# Patient Record
Sex: FEMALE | Race: WHITE | NOT HISPANIC OR LATINO | Employment: OTHER | URBAN - METROPOLITAN AREA
[De-identification: names, ages, dates, MRNs, and addresses within clinical notes are randomized per-mention and may not be internally consistent; named-entity substitution may affect disease eponyms.]

---

## 2023-03-15 LAB
ALBUMIN (G/DL) IN SER/PLAS: 4.1 G/DL (ref 3.4–5)
ANION GAP IN SER/PLAS: 17 MMOL/L (ref 10–20)
CALCIUM (MG/DL) IN SER/PLAS: 9.2 MG/DL (ref 8.6–10.6)
CARBON DIOXIDE, TOTAL (MMOL/L) IN SER/PLAS: 26 MMOL/L (ref 21–32)
CHLORIDE (MMOL/L) IN SER/PLAS: 104 MMOL/L (ref 98–107)
CREATININE (MG/DL) IN SER/PLAS: 0.72 MG/DL (ref 0.5–1.05)
GFR FEMALE: >90 ML/MIN/1.73M2
GLUCOSE (MG/DL) IN SER/PLAS: 107 MG/DL (ref 74–99)
PHOSPHATE (MG/DL) IN SER/PLAS: 3.8 MG/DL (ref 2.5–4.9)
POTASSIUM (MMOL/L) IN SER/PLAS: 3.9 MMOL/L (ref 3.5–5.3)
SODIUM (MMOL/L) IN SER/PLAS: 143 MMOL/L (ref 136–145)
UREA NITROGEN (MG/DL) IN SER/PLAS: 11 MG/DL (ref 6–23)

## 2023-04-26 ENCOUNTER — OFFICE VISIT (OUTPATIENT)
Dept: PRIMARY CARE | Facility: CLINIC | Age: 68
End: 2023-04-26
Payer: COMMERCIAL

## 2023-04-26 VITALS
TEMPERATURE: 97.5 F | SYSTOLIC BLOOD PRESSURE: 128 MMHG | BODY MASS INDEX: 32.13 KG/M2 | HEIGHT: 68 IN | HEART RATE: 98 BPM | OXYGEN SATURATION: 100 % | RESPIRATION RATE: 16 BRPM | DIASTOLIC BLOOD PRESSURE: 88 MMHG | WEIGHT: 212 LBS

## 2023-04-26 DIAGNOSIS — M47.16 LUMBAR SPONDYLOSIS WITH MYELOPATHY: ICD-10-CM

## 2023-04-26 DIAGNOSIS — I48.92 ATRIAL FLUTTER, UNSPECIFIED TYPE (MULTI): ICD-10-CM

## 2023-04-26 DIAGNOSIS — I48.0 PAROXYSMAL ATRIAL FIBRILLATION WITH RVR (MULTI): ICD-10-CM

## 2023-04-26 DIAGNOSIS — J44.89 COPD WITH CHRONIC BRONCHITIS (MULTI): ICD-10-CM

## 2023-04-26 DIAGNOSIS — K21.9 GASTRO-ESOPHAGEAL REFLUX DISEASE WITHOUT ESOPHAGITIS: ICD-10-CM

## 2023-04-26 DIAGNOSIS — J44.1 COPD EXACERBATION (MULTI): ICD-10-CM

## 2023-04-26 DIAGNOSIS — E11.9 TYPE 2 DIABETES MELLITUS WITHOUT COMPLICATION, WITHOUT LONG-TERM CURRENT USE OF INSULIN (MULTI): Primary | ICD-10-CM

## 2023-04-26 DIAGNOSIS — F33.1 MODERATE EPISODE OF RECURRENT MAJOR DEPRESSIVE DISORDER (MULTI): ICD-10-CM

## 2023-04-26 DIAGNOSIS — E78.2 MIXED HYPERLIPIDEMIA: ICD-10-CM

## 2023-04-26 DIAGNOSIS — Z12.31 ENCOUNTER FOR SCREENING MAMMOGRAM FOR BREAST CANCER: ICD-10-CM

## 2023-04-26 DIAGNOSIS — I10 ESSENTIAL HYPERTENSION: ICD-10-CM

## 2023-04-26 PROBLEM — M16.0 PRIMARY OSTEOARTHRITIS OF BOTH HIPS: Status: ACTIVE | Noted: 2023-04-26

## 2023-04-26 PROBLEM — K92.2 ACUTE GI BLEEDING: Status: ACTIVE | Noted: 2023-04-26

## 2023-04-26 PROBLEM — Z95.818 PRESENCE OF WATCHMAN LEFT ATRIAL APPENDAGE CLOSURE DEVICE: Status: ACTIVE | Noted: 2023-04-26

## 2023-04-26 PROBLEM — R73.9 HYPERGLYCEMIA: Status: ACTIVE | Noted: 2023-04-26

## 2023-04-26 PROBLEM — K63.5 COLON POLYPS: Status: ACTIVE | Noted: 2023-04-26

## 2023-04-26 PROBLEM — R30.0 DYSURIA: Status: ACTIVE | Noted: 2023-04-26

## 2023-04-26 PROBLEM — M54.30 SCIATICA: Status: ACTIVE | Noted: 2023-04-26

## 2023-04-26 PROBLEM — M54.2 NECK PAIN ON RIGHT SIDE: Status: ACTIVE | Noted: 2023-04-26

## 2023-04-26 PROBLEM — F41.1 GENERALIZED ANXIETY DISORDER: Status: ACTIVE | Noted: 2023-04-26

## 2023-04-26 PROBLEM — D50.0 ANEMIA DUE TO BLOOD LOSS: Status: ACTIVE | Noted: 2023-04-26

## 2023-04-26 PROBLEM — M25.551 CHRONIC PAIN OF BOTH HIPS: Status: ACTIVE | Noted: 2023-04-26

## 2023-04-26 PROBLEM — R53.82 CHRONIC FATIGUE: Status: ACTIVE | Noted: 2023-04-26

## 2023-04-26 PROBLEM — G89.29 CHRONIC LOW BACK PAIN: Status: ACTIVE | Noted: 2023-04-26

## 2023-04-26 PROBLEM — G89.29 CHRONIC PAIN OF BOTH HIPS: Status: ACTIVE | Noted: 2023-04-26

## 2023-04-26 PROBLEM — N39.0 ACUTE UTI: Status: ACTIVE | Noted: 2023-04-26

## 2023-04-26 PROBLEM — R06.09 DYSPNEA ON MINIMAL EXERTION: Status: ACTIVE | Noted: 2023-04-26

## 2023-04-26 PROBLEM — M25.511 ACUTE PAIN OF RIGHT SHOULDER: Status: ACTIVE | Noted: 2023-04-26

## 2023-04-26 PROBLEM — M54.50 CHRONIC LOW BACK PAIN: Status: ACTIVE | Noted: 2023-04-26

## 2023-04-26 PROBLEM — R20.2 NUMBNESS AND TINGLING OF RIGHT UPPER EXTREMITY: Status: ACTIVE | Noted: 2023-04-26

## 2023-04-26 PROBLEM — L03.116 CELLULITIS OF FOOT, LEFT: Status: ACTIVE | Noted: 2023-04-26

## 2023-04-26 PROBLEM — H81.10 BPPV (BENIGN PAROXYSMAL POSITIONAL VERTIGO): Status: ACTIVE | Noted: 2023-04-26

## 2023-04-26 PROBLEM — G89.29 CHRONIC PAIN OF LEFT KNEE: Status: ACTIVE | Noted: 2023-04-26

## 2023-04-26 PROBLEM — R39.9 SYMPTOMS OF URINARY TRACT INFECTION: Status: ACTIVE | Noted: 2023-04-26

## 2023-04-26 PROBLEM — R35.0 INCREASED URINARY FREQUENCY: Status: ACTIVE | Noted: 2023-04-26

## 2023-04-26 PROBLEM — S46.911A: Status: ACTIVE | Noted: 2023-04-26

## 2023-04-26 PROBLEM — Z96.649 STATUS POST THR (TOTAL HIP REPLACEMENT): Status: ACTIVE | Noted: 2023-04-26

## 2023-04-26 PROBLEM — R30.0 BURNING WITH URINATION: Status: ACTIVE | Noted: 2023-04-26

## 2023-04-26 PROBLEM — N39.0 RECURRENT UTI: Status: ACTIVE | Noted: 2023-04-26

## 2023-04-26 PROBLEM — R10.9 FLANK PAIN: Status: ACTIVE | Noted: 2023-04-26

## 2023-04-26 PROBLEM — S83.251A BUCKET HANDLE TEAR OF LATERAL MENISCUS OF RIGHT KNEE: Status: ACTIVE | Noted: 2023-04-26

## 2023-04-26 PROBLEM — M25.562 CHRONIC PAIN OF LEFT KNEE: Status: ACTIVE | Noted: 2023-04-26

## 2023-04-26 PROBLEM — R73.01 IMPAIRED FASTING GLUCOSE: Status: ACTIVE | Noted: 2023-04-26

## 2023-04-26 PROBLEM — E66.9 OBESITY: Status: ACTIVE | Noted: 2023-04-26

## 2023-04-26 PROBLEM — R60.0 BILATERAL LEG EDEMA: Status: ACTIVE | Noted: 2023-04-26

## 2023-04-26 PROBLEM — M77.12 LATERAL EPICONDYLITIS OF LEFT ELBOW: Status: ACTIVE | Noted: 2023-04-26

## 2023-04-26 PROBLEM — S90.32XA CONTUSION OF LEFT FOOT: Status: ACTIVE | Noted: 2023-04-26

## 2023-04-26 PROBLEM — D64.9 CHRONIC ANEMIA: Status: ACTIVE | Noted: 2023-04-26

## 2023-04-26 PROBLEM — K52.9 JEJUNITIS: Status: ACTIVE | Noted: 2023-04-26

## 2023-04-26 PROBLEM — R20.0 NUMBNESS AND TINGLING OF RIGHT UPPER EXTREMITY: Status: ACTIVE | Noted: 2023-04-26

## 2023-04-26 PROBLEM — R06.02 SHORTNESS OF BREATH: Status: ACTIVE | Noted: 2023-04-26

## 2023-04-26 PROBLEM — J18.9 COMMUNITY ACQUIRED PNEUMONIA: Status: ACTIVE | Noted: 2023-04-26

## 2023-04-26 PROBLEM — M19.91 PRIMARY LOCALIZED OSTEOARTHROSIS OF MULTIPLE SITES: Status: ACTIVE | Noted: 2023-04-26

## 2023-04-26 PROBLEM — N20.0 RECURRENT NEPHROLITHIASIS: Status: ACTIVE | Noted: 2023-04-26

## 2023-04-26 PROBLEM — R07.89 ATYPICAL CHEST PAIN: Status: ACTIVE | Noted: 2023-04-26

## 2023-04-26 PROBLEM — M25.362 INSTABILITY OF LEFT KNEE JOINT: Status: ACTIVE | Noted: 2023-04-26

## 2023-04-26 PROBLEM — R80.9 MICROALBUMINURIA: Status: ACTIVE | Noted: 2023-04-26

## 2023-04-26 PROBLEM — S83.282A TEAR OF LATERAL MENISCUS OF LEFT KNEE, CURRENT: Status: ACTIVE | Noted: 2023-04-26

## 2023-04-26 PROBLEM — M25.552 CHRONIC PAIN OF BOTH HIPS: Status: ACTIVE | Noted: 2023-04-26

## 2023-04-26 PROBLEM — R31.0 HEMATURIA, GROSS: Status: ACTIVE | Noted: 2023-04-26

## 2023-04-26 PROBLEM — E53.8 VITAMIN B12 DEFICIENCY: Status: ACTIVE | Noted: 2023-04-26

## 2023-04-26 PROBLEM — D53.9 MACROCYTIC ANEMIA: Status: ACTIVE | Noted: 2023-04-26

## 2023-04-26 PROBLEM — N35.919 URETHRAL STRICTURE: Status: ACTIVE | Noted: 2023-04-26

## 2023-04-26 PROBLEM — F51.04 CHRONIC INSOMNIA: Status: ACTIVE | Noted: 2023-04-26

## 2023-04-26 PROBLEM — K92.2 LOWER GASTROINTESTINAL BLEEDING: Status: ACTIVE | Noted: 2023-04-26

## 2023-04-26 PROBLEM — N23 RENAL COLIC ON LEFT SIDE: Status: ACTIVE | Noted: 2023-04-26

## 2023-04-26 PROBLEM — M54.16 LUMBAR RADICULOPATHY: Status: ACTIVE | Noted: 2023-04-26

## 2023-04-26 PROBLEM — L23.7: Status: ACTIVE | Noted: 2023-04-26

## 2023-04-26 PROBLEM — R26.89 DECREASED MOBILITY: Status: ACTIVE | Noted: 2023-04-26

## 2023-04-26 PROCEDURE — 1160F RVW MEDS BY RX/DR IN RCRD: CPT | Performed by: FAMILY MEDICINE

## 2023-04-26 PROCEDURE — 1036F TOBACCO NON-USER: CPT | Performed by: FAMILY MEDICINE

## 2023-04-26 PROCEDURE — 3044F HG A1C LEVEL LT 7.0%: CPT | Performed by: FAMILY MEDICINE

## 2023-04-26 PROCEDURE — 3074F SYST BP LT 130 MM HG: CPT | Performed by: FAMILY MEDICINE

## 2023-04-26 PROCEDURE — 99214 OFFICE O/P EST MOD 30 MIN: CPT | Performed by: FAMILY MEDICINE

## 2023-04-26 PROCEDURE — 1170F FXNL STATUS ASSESSED: CPT | Performed by: FAMILY MEDICINE

## 2023-04-26 PROCEDURE — 3079F DIAST BP 80-89 MM HG: CPT | Performed by: FAMILY MEDICINE

## 2023-04-26 PROCEDURE — 1159F MED LIST DOCD IN RCRD: CPT | Performed by: FAMILY MEDICINE

## 2023-04-26 RX ORDER — APIXABAN 5 MG/1
TABLET, FILM COATED ORAL
COMMUNITY
Start: 2022-04-27 | End: 2023-04-26 | Stop reason: ALTCHOICE

## 2023-04-26 RX ORDER — BLOOD SUGAR DIAGNOSTIC
STRIP MISCELLANEOUS
COMMUNITY
End: 2024-01-03 | Stop reason: SDUPTHER

## 2023-04-26 RX ORDER — OMEPRAZOLE 40 MG/1
CAPSULE, DELAYED RELEASE ORAL
COMMUNITY
Start: 2017-01-11 | End: 2023-04-26 | Stop reason: SDUPTHER

## 2023-04-26 RX ORDER — CLOPIDOGREL BISULFATE 75 MG/1
TABLET ORAL
COMMUNITY
End: 2023-04-26 | Stop reason: ALTCHOICE

## 2023-04-26 RX ORDER — LOSARTAN POTASSIUM 25 MG/1
TABLET ORAL
COMMUNITY
End: 2023-04-26 | Stop reason: ALTCHOICE

## 2023-04-26 RX ORDER — TIZANIDINE 4 MG/1
TABLET ORAL
COMMUNITY
End: 2023-10-26 | Stop reason: ALTCHOICE

## 2023-04-26 RX ORDER — ATORVASTATIN CALCIUM 20 MG/1
TABLET, FILM COATED ORAL
COMMUNITY
Start: 2021-12-21 | End: 2023-04-26 | Stop reason: SINTOL

## 2023-04-26 RX ORDER — OMEPRAZOLE 40 MG/1
40 CAPSULE, DELAYED RELEASE ORAL DAILY
Qty: 30 CAPSULE | Refills: 5 | Status: SHIPPED | OUTPATIENT
Start: 2023-04-26 | End: 2023-08-03 | Stop reason: SDUPTHER

## 2023-04-26 RX ORDER — LINAGLIPTIN 5 MG/1
TABLET, FILM COATED ORAL
COMMUNITY
End: 2023-04-26 | Stop reason: ALTCHOICE

## 2023-04-26 RX ORDER — ALBUTEROL SULFATE 90 UG/1
AEROSOL, METERED RESPIRATORY (INHALATION)
COMMUNITY
Start: 2020-05-26

## 2023-04-26 RX ORDER — NITROFURANTOIN 25; 75 MG/1; MG/1
CAPSULE ORAL
COMMUNITY
Start: 2023-02-28 | End: 2023-04-26 | Stop reason: ALTCHOICE

## 2023-04-26 RX ORDER — HYDROCODONE BITARTRATE AND ACETAMINOPHEN 5; 325 MG/1; MG/1
TABLET ORAL
COMMUNITY
Start: 2023-01-25 | End: 2023-04-26 | Stop reason: ALTCHOICE

## 2023-04-26 RX ORDER — NAPROXEN SODIUM 220 MG/1
TABLET, FILM COATED ORAL
COMMUNITY
End: 2024-01-03 | Stop reason: SDUPTHER

## 2023-04-26 ASSESSMENT — ENCOUNTER SYMPTOMS
DEPRESSION: 0
OCCASIONAL FEELINGS OF UNSTEADINESS: 0
LOSS OF SENSATION IN FEET: 0

## 2023-04-26 ASSESSMENT — ACTIVITIES OF DAILY LIVING (ADL)
GROCERY_SHOPPING: INDEPENDENT
MANAGING_FINANCES: INDEPENDENT
TAKING_MEDICATION: INDEPENDENT
BATHING: INDEPENDENT
DRESSING: INDEPENDENT
DOING_HOUSEWORK: INDEPENDENT

## 2023-04-26 ASSESSMENT — PATIENT HEALTH QUESTIONNAIRE - PHQ9
1. LITTLE INTEREST OR PLEASURE IN DOING THINGS: NOT AT ALL
SUM OF ALL RESPONSES TO PHQ9 QUESTIONS 1 AND 2: 0
2. FEELING DOWN, DEPRESSED OR HOPELESS: NOT AT ALL

## 2023-04-26 NOTE — PROGRESS NOTES
Chief Complaint   Patient presents with    Follow-up     Diabetes, Hypertension, Hyperlipidemia      She presents today for follow up on Diabetes Mellitus, hypertension, hyperlipidemia    Current  diabetes related symptoms include: none. Patient denies foot ulcerations, hypoglycemia , nausea, paresthesia of the feet, polydipsia, polyuria, visual disturbances, vomiting, and weight loss.  Patient denies chest pain, claudication, dyspnea, exertional chest pressure/discomfort, irregular heart beat, lower extremity edema, orthopnea, palpitations, paroxysmal nocturnal dyspnea, and syncope. Evaluation to date has included: fasting blood sugar, fasting lipid panel, hemoglobin A1C, and microalbuminuria.  Home sugars: BGs consistently in an acceptable range.   Patient denies chest pain, claudication, dyspnea, exertional chest pressure/discomfort, irregular heart beat, lower extremity edema, orthopnea, palpitations, paroxysmal nocturnal dyspnea, and syncope.     Past Medical History:   Diagnosis Date    Personal history of other diseases of the circulatory system 06/30/2021    History of hypertension    Personal history of other diseases of the digestive system 03/08/2017    History of gastrointestinal hemorrhage    Personal history of other diseases of the digestive system 03/08/2017    History of small bowel obstruction    Personal history of other specified conditions 06/30/2021    History of chest pain    Unspecified atrial fibrillation (CMS/HCC) 11/25/2019    Atrial fibrillation with RVR     Patient Active Problem List    Diagnosis Date Noted    Acute pain of right shoulder 04/26/2023    Acute UTI 04/26/2023    Atrial flutter (CMS/HCC) 04/26/2023    Atypical chest pain 04/26/2023    Bilateral leg edema 04/26/2023    BPPV (benign paroxysmal positional vertigo) 04/26/2023    Bucket handle tear of lateral meniscus of right knee 04/26/2023    Burning with urination 04/26/2023    Dysuria 04/26/2023    Cellulitis of foot, left  04/26/2023    Chronic fatigue 04/26/2023    Chronic low back pain 04/26/2023    Chronic pain of both hips 04/26/2023    Flank pain 04/26/2023    Chronic pain of left knee 04/26/2023    Colon polyps 04/26/2023    Community acquired pneumonia 04/26/2023    Contact dermatitis due to poison vine 04/26/2023    Contusion of left foot 04/26/2023    COPD exacerbation (CMS/HCC) 04/26/2023    COPD with chronic bronchitis (CMS/HCC) 04/26/2023    Decreased mobility 04/26/2023    Essential hypertension 04/26/2023    Gastro-esophageal reflux disease without esophagitis 04/26/2023    Generalized anxiety disorder 04/26/2023    Hematuria, gross 04/26/2023    Hyperglycemia 04/26/2023    Impaired fasting glucose 04/26/2023    Increased urinary frequency 04/26/2023    Instability of left knee joint 04/26/2023    Jejunitis 04/26/2023    Lateral epicondylitis of left elbow 04/26/2023    Acute GI bleeding 04/26/2023    Lower gastrointestinal bleeding 04/26/2023    Lumbar radiculopathy 04/26/2023    Lumbar spondylosis with myelopathy 04/26/2023    Anemia due to blood loss 04/26/2023    Chronic anemia 04/26/2023    Chronic insomnia 04/26/2023    Macrocytic anemia 04/26/2023    Microalbuminuria 04/26/2023    Mixed hyperlipidemia 04/26/2023    Moderate episode of recurrent major depressive disorder (CMS/HCC) 04/26/2023    Muscle strain, shoulder region, right, initial encounter 04/26/2023    Neck pain on right side 04/26/2023    Numbness and tingling of right upper extremity 04/26/2023    Obesity 04/26/2023    Paroxysmal atrial fibrillation with RVR (CMS/HCC) 04/26/2023    Presence of Watchman left atrial appendage closure device 04/26/2023    Primary localized osteoarthrosis of multiple sites 04/26/2023    Primary osteoarthritis of both hips 04/26/2023    Recurrent nephrolithiasis 04/26/2023    Recurrent UTI 04/26/2023    Renal colic on left side 04/26/2023    Sciatica 04/26/2023    Dyspnea on minimal exertion 04/26/2023    Shortness of  breath 04/26/2023    Status post THR (total hip replacement) 04/26/2023    Symptoms of urinary tract infection 04/26/2023    Tear of lateral meniscus of left knee, current 04/26/2023    Type 2 diabetes mellitus without complication, without long-term current use of insulin (CMS/[facility]) 04/26/2023    Urethral stricture 04/26/2023    Vitamin B12 deficiency 04/26/2023     Past Surgical History:   Procedure Laterality Date    APPENDECTOMY  03/08/2017    Appendectomy    CT ABDOMEN PELVIS ANGIOGRAM W AND/OR WO IV CONTRAST  9/7/2022    CT ABDOMEN PELVIS ANGIOGRAM W AND/OR WO IV CONTRAST 9/7/2022 [facility]    HYSTERECTOMY  03/08/2017    Hysterectomy    OTHER SURGICAL HISTORY  07/21/2022    Colonoscopy    OTHER SURGICAL HISTORY  01/23/2021    Hip replacement    OTHER SURGICAL HISTORY  12/22/2022    Atrial appendage closure device insertion    OTHER SURGICAL HISTORY  03/15/2021    Renal lithotripsy    OTHER SURGICAL HISTORY  01/27/2022    Colonic polypectomy    OTHER SURGICAL HISTORY  01/27/2022    Urethral dilation    OTHER SURGICAL HISTORY  12/27/2021    Bladder surgery    OTHER SURGICAL HISTORY  12/27/2021    Complete colonoscopy    OTHER SURGICAL HISTORY  12/27/2021    Tubal ligation    OTHER SURGICAL HISTORY  12/27/2021    Tooth extraction    OTHER SURGICAL HISTORY  12/27/2021    [place] tooth extraction    OTHER SURGICAL HISTORY  12/27/2021    Esophagogastroduodenoscopy    ROTATOR CUFF REPAIR  03/08/2017    Rotator Cuff Repair     No family history on file.    Social History     Socioeconomic History    Marital status: Single     Spouse name: None    Number of children: None    Years of education: None    Highest education level: None   Occupational History    None   Tobacco Use    Smoking status: Never     Passive exposure: Never    Smokeless tobacco: Never   Vaping Use    Vaping status: None   Substance and Sexual Activity    Alcohol use: Never    Drug use: None    Sexual activity: None   Other Topics Concern     None   Social History Narrative    None     Social Determinants of Health     Financial Resource Strain: Not on file   Food Insecurity: Not on file   Transportation Needs: Not on file   Physical Activity: Not on file   Stress: Not on file   Social Connections: Not on file   Intimate Partner Violence: Not on file   Housing Stability: Not on file     Current Outpatient Medications   Medication Sig Dispense Refill    albuterol 90 mcg/actuation inhaler Inhale 2 puffs into the lungs every 6 hours as needed for Wheezing      aspirin 81 mg chewable tablet CHEW AND SWALLOW 1 TABLET DAILY.      omeprazole (PriLOSEC) 40 mg DR capsule Take 1 capsule (40 mg) by mouth once daily. Do not crush or chew. 30 capsule 5    OneTouch Ultra Test strip use 1 strip to check glucose once daily      tiZANidine (Zanaflex) 4 mg tablet TAKE 1 TABLET BY MOUTH NIGHTLY AS NEEDED FOR MUSCLE SPASM       No current facility-administered medications for this visit.     No current outpatient medications on file prior to visit.     No current facility-administered medications on file prior to visit.     Allergies   Allergen Reactions    Adhesive Tape-Silicones Unknown    Atorvastatin Other     myalgia      Review of Systems - General ROS: negative for - fatigue, fever, malaise, night sweats, sleep disturbance or weight loss  Psychological ROS: negative for - anxiety, concentration difficulties, depression, memory difficulties or sleep disturbances  ENT ROS: negative for - hearing change, nasal discharge, oral lesions, sinus pain, sore throat, tinnitus or vertigo  Allergy and Immunology ROS: negative for - hives, nasal congestion or seasonal allergies  Hematological and Lymphatic ROS: negative for - bruising, fatigue, night sweats or pallor  Endocrine ROS: negative for - hot flashes, malaise/lethargy, palpitations, polydipsia/polyuria, skin changes, temperature intolerance or unexpected weight changes  Respiratory ROS: negative for - cough,  "hemoptysis, pleuritic pain, shortness of breath or wheezing  Cardiovascular ROS: no chest pain or dyspnea on exertion  Gastrointestinal ROS: no abdominal pain, change in bowel habits, or black or bloody stools  Genito-Urinary ROS: no dysuria, trouble voiding, or hematuria  Neurological ROS: negative for - dizziness, gait disturbance, headaches, impaired coordination/balance, numbness/tingling, tremors or visual changes  Dermatological ROS: negative for - dry skin, lumps, pruritus or rash    Blood pressure 128/88, pulse 98, temperature 36.4 °C (97.5 °F), resp. rate 16, height 1.727 m (5' 8\"), weight 96.2 kg (212 lb), SpO2 100 %.    Physical Examination: General appearance - alert, well appearing, and in no distress  Mental status - alert, oriented to person, place, and time  Eyes - pupils equal and reactive, extraocular eye movements intact  Ears - bilateral TM's and external ear canals normal  Mouth - mucous membranes moist, pharynx normal without lesions  Neck - supple, no significant adenopathy  Lymphatics - no palpable lymphadenopathy, no hepatosplenomegaly  Chest - clear to auscultation, no wheezes, rales or rhonchi, symmetric air entry  Heart - normal rate, regular rhythm, normal S1, S2, no murmurs, rubs, clicks or gallops  Abdomen - soft, nontender, nondistended, no masses or organomegaly  Neurological - alert, oriented, normal speech, no focal findings or movement disorder noted  Extremities: peripheral pulses normal, no pedal edema, no clubbing or cyanosis.    Orders Only on 03/15/2023   Component Date Value Ref Range Status    Glucose 03/15/2023 107 (H)  74 - 99 mg/dL Final    Sodium 03/15/2023 143  136 - 145 mmol/L Final    Potassium 03/15/2023 3.9  3.5 - 5.3 mmol/L Final    Chloride 03/15/2023 104  98 - 107 mmol/L Final    Bicarbonate 03/15/2023 26  21 - 32 mmol/L Final    Anion Gap 03/15/2023 17  10 - 20 mmol/L Final    Urea Nitrogen 03/15/2023 11  6 - 23 mg/dL Final    Creatinine 03/15/2023 0.72  0.50 - " "1.05 mg/dL Final    GFR Female 03/15/2023 >90  >90 mL/min/1.73m2 Final    Comment:  CALCULATIONS OF ESTIMATED GFR ARE PERFORMED   USING THE 2021 CKD-EPI STUDY REFIT EQUATION   WITHOUT THE RACE VARIABLE FOR THE IDMS-TRACEABLE   CREATININE METHODS.    https://jasn.asnjournals.org/content/early/2021/09/22/ASN.[email or web address]      Calcium 03/15/2023 9.2  8.6 - 10.6 mg/dL Final    Phosphorus 03/15/2023 3.8  2.5 - 4.9 mg/dL Final    Comment:  The performance characteristics of phosphorus testing in   heparinized plasma have been validated by the individual     laboratory site where testing is performed. Testing    on heparinized plasma is not approved by the FDA;    however, such approval is not necessary.      Albumin 03/15/2023 4.1  3.4 - 5.0 g/dL Final       Problem List Items Addressed This Visit       Atrial flutter (CMS/[facility])    Current Assessment & Plan     Chronic Condition Documentation : Stable based on symptoms and exam.  Continue established treatment plan and follow-up at least yearly.           COPD exacerbation (CMS/HCC)    Current Assessment & Plan     Chronic Condition Documentation : Stable based on symptoms and exam.  Continue established treatment plan and follow-up at least yearly.           COPD with chronic bronchitis (CMS/[facility])    Current Assessment & Plan     Chronic Condition Documentation : Stable based on symptoms and exam.  Continue established treatment plan and follow-up at least yearly.           Essential hypertension    Current Assessment & Plan     Dietary sodium restriction.  Regular aerobic exercise program is recommended to help achieve and maintain normal body weight, fitness and improve lipid balance. .  Dietary changes: Increase soluble fiber  Plant sterols 2grams per day (e.g. Benecol)  Reduce saturated fat, \"trans\" monounsaturated fatty acids, and cholesterol           Relevant Orders    CBC and Auto Differential    Comprehensive Metabolic Panel    Lipid Panel    Hemoglobin A1C    " Gastro-esophageal reflux disease without esophagitis    Relevant Medications    omeprazole (PriLOSEC) 40 mg DR capsule    Lumbar spondylosis with myelopathy    Mixed hyperlipidemia    Current Assessment & Plan     The nature of cardiac risk has been fully discussed with this patient. Discussed cardiovascular risk analysis and appropriate diet with the need for lifelong measures to reduce the risk. A regular exercise program is recommended to help achieve and maintain normal body weight, fitness and improve lipid balance. Patient education provided. They understand and agree with this course of treatment. They will return with new or worsening symptoms. Patient instructed to remain current with appropriate annual health maintenance.            Relevant Orders    CBC and Auto Differential    Comprehensive Metabolic Panel    Lipid Panel    Hemoglobin A1C    Moderate episode of recurrent major depressive disorder (CMS/[facility])    Current Assessment & Plan     Chronic Condition Documentation : Stable based on symptoms and exam.  Continue established treatment plan and follow-up at least yearly.           Paroxysmal atrial fibrillation with RVR (CMS/[facility])    Current Assessment & Plan     Chronic Condition Documentation : Stable based on symptoms and exam.  Continue established treatment plan and follow-up at least yearly.           Type 2 diabetes mellitus without complication, without long-term current use of insulin (CMS/[facility]) - Primary    Current Assessment & Plan     Diabetes Mellitus type II, under inadequate control.  1. Rx changes: None  2. Education: Reviewed ‘ABCs’ of diabetes management (respective goals in parentheses):  A1C (<7), blood pressure (<130/80), and cholesterol (LDL <100).  3. Compliance at present is estimated to be inadequate. Efforts to improve compliance (if necessary) will be directed at dietary modifications: and increased exercise.  4. Follow up: after labs           Relevant Orders    CBC and Auto  Differential    Comprehensive Metabolic Panel    Lipid Panel    Hemoglobin A1C     Patient to keep food diary and log of blood sugars and bring to next office visit. Patient encouraged to increase activity level gradually and encouraged weight loss. Strongly encouraged to maintain blood sugar at levels as close to normal as possible thus preventing or delaying complications (regular medical care is important for this). Encouraged to follow a balanced meal plan. Eat consistent and moderate amounts of food at regular times. Nuts and peanut butter are a good choice for a snack. Advised patient to not skip meals. Recommended that patient: Eat plenty of vegetables and fiber, limited amounts of fat, moderate amounts of protein and low-fat dairy products, and carefully limit foods containing high concentrated sugar. Keep a record of your food intake. We will review at the next visit. Educated patient about exercise. Exercise lowers blood glucose levels. Regular exercise (eg, 30-60 minutes of walking every day) can help keep blood glucose in better control. Exercise increases insulin sensitivity and helps an individual lose weight. It can also help overall health.     Scribe Attestation  By signing my name below, I, Maria Del Rosario Juan   attest that this documentation has been prepared under the direction and in the presence of Hany Leo MD.

## 2023-04-26 NOTE — ASSESSMENT & PLAN NOTE
Diabetes Mellitus type II, under inadequate control.  1. Rx changes: None  2. Education: Reviewed ‘ABCs’ of diabetes management (respective goals in parentheses):  A1C (<7), blood pressure (<130/80), and cholesterol (LDL <100).  3. Compliance at present is estimated to be inadequate. Efforts to improve compliance (if necessary) will be directed at dietary modifications: and increased exercise.  4. Follow up: after labs

## 2023-04-28 ENCOUNTER — LAB (OUTPATIENT)
Dept: LAB | Facility: LAB | Age: 68
End: 2023-04-28
Payer: COMMERCIAL

## 2023-04-28 DIAGNOSIS — E78.2 MIXED HYPERLIPIDEMIA: ICD-10-CM

## 2023-04-28 DIAGNOSIS — E11.9 TYPE 2 DIABETES MELLITUS WITHOUT COMPLICATION, WITHOUT LONG-TERM CURRENT USE OF INSULIN (MULTI): ICD-10-CM

## 2023-04-28 DIAGNOSIS — I10 ESSENTIAL HYPERTENSION: ICD-10-CM

## 2023-04-28 LAB
ALANINE AMINOTRANSFERASE (SGPT) (U/L) IN SER/PLAS: 14 U/L (ref 7–45)
ALBUMIN (G/DL) IN SER/PLAS: 4.3 G/DL (ref 3.4–5)
ALKALINE PHOSPHATASE (U/L) IN SER/PLAS: 105 U/L (ref 33–136)
ANION GAP IN SER/PLAS: 13 MMOL/L (ref 10–20)
ASPARTATE AMINOTRANSFERASE (SGOT) (U/L) IN SER/PLAS: 18 U/L (ref 9–39)
BASOPHILS (10*3/UL) IN BLOOD BY AUTOMATED COUNT: 0.02 X10E9/L (ref 0–0.1)
BASOPHILS/100 LEUKOCYTES IN BLOOD BY AUTOMATED COUNT: 0.3 % (ref 0–2)
BILIRUBIN TOTAL (MG/DL) IN SER/PLAS: 0.4 MG/DL (ref 0–1.2)
CALCIUM (MG/DL) IN SER/PLAS: 9.4 MG/DL (ref 8.6–10.6)
CARBON DIOXIDE, TOTAL (MMOL/L) IN SER/PLAS: 28 MMOL/L (ref 21–32)
CHLORIDE (MMOL/L) IN SER/PLAS: 104 MMOL/L (ref 98–107)
CHOLESTEROL (MG/DL) IN SER/PLAS: 182 MG/DL (ref 0–199)
CHOLESTEROL IN HDL (MG/DL) IN SER/PLAS: 41.7 MG/DL
CHOLESTEROL/HDL RATIO: 4.4
CREATININE (MG/DL) IN SER/PLAS: 0.78 MG/DL (ref 0.5–1.05)
EOSINOPHILS (10*3/UL) IN BLOOD BY AUTOMATED COUNT: 0.11 X10E9/L (ref 0–0.7)
EOSINOPHILS/100 LEUKOCYTES IN BLOOD BY AUTOMATED COUNT: 1.9 % (ref 0–6)
ERYTHROCYTE DISTRIBUTION WIDTH (RATIO) BY AUTOMATED COUNT: 14.9 % (ref 11.5–14.5)
ERYTHROCYTE MEAN CORPUSCULAR HEMOGLOBIN CONCENTRATION (G/DL) BY AUTOMATED: 29.9 G/DL (ref 32–36)
ERYTHROCYTE MEAN CORPUSCULAR VOLUME (FL) BY AUTOMATED COUNT: 86 FL (ref 80–100)
ERYTHROCYTES (10*6/UL) IN BLOOD BY AUTOMATED COUNT: 4.76 X10E12/L (ref 4–5.2)
ESTIMATED AVERAGE GLUCOSE FOR HBA1C: 146 MG/DL
GFR FEMALE: 84 ML/MIN/1.73M2
GLUCOSE (MG/DL) IN SER/PLAS: 116 MG/DL (ref 74–99)
HEMATOCRIT (%) IN BLOOD BY AUTOMATED COUNT: 41.1 % (ref 36–46)
HEMOGLOBIN (G/DL) IN BLOOD: 12.3 G/DL (ref 12–16)
HEMOGLOBIN A1C/HEMOGLOBIN TOTAL IN BLOOD: 6.7 %
IMMATURE GRANULOCYTES/100 LEUKOCYTES IN BLOOD BY AUTOMATED COUNT: 0.3 % (ref 0–0.9)
LDL: 95 MG/DL (ref 0–99)
LEUKOCYTES (10*3/UL) IN BLOOD BY AUTOMATED COUNT: 5.9 X10E9/L (ref 4.4–11.3)
LYMPHOCYTES (10*3/UL) IN BLOOD BY AUTOMATED COUNT: 2.61 X10E9/L (ref 1.2–4.8)
LYMPHOCYTES/100 LEUKOCYTES IN BLOOD BY AUTOMATED COUNT: 44.2 % (ref 13–44)
MONOCYTES (10*3/UL) IN BLOOD BY AUTOMATED COUNT: 0.44 X10E9/L (ref 0.1–1)
MONOCYTES/100 LEUKOCYTES IN BLOOD BY AUTOMATED COUNT: 7.5 % (ref 2–10)
NEUTROPHILS (10*3/UL) IN BLOOD BY AUTOMATED COUNT: 2.7 X10E9/L (ref 1.2–7.7)
NEUTROPHILS/100 LEUKOCYTES IN BLOOD BY AUTOMATED COUNT: 45.8 % (ref 40–80)
NON HDL CHOLESTEROL: 140 MG/DL
NRBC (PER 100 WBCS) BY AUTOMATED COUNT: 0 /100 WBC (ref 0–0)
PLATELETS (10*3/UL) IN BLOOD AUTOMATED COUNT: 307 X10E9/L (ref 150–450)
POTASSIUM (MMOL/L) IN SER/PLAS: 4 MMOL/L (ref 3.5–5.3)
PROTEIN TOTAL: 7.1 G/DL (ref 6.4–8.2)
SODIUM (MMOL/L) IN SER/PLAS: 141 MMOL/L (ref 136–145)
TRIGLYCERIDE (MG/DL) IN SER/PLAS: 226 MG/DL (ref 0–149)
UREA NITROGEN (MG/DL) IN SER/PLAS: 13 MG/DL (ref 6–23)
VLDL: 45 MG/DL (ref 0–40)

## 2023-04-28 PROCEDURE — 85025 COMPLETE CBC W/AUTO DIFF WBC: CPT

## 2023-04-28 PROCEDURE — 80061 LIPID PANEL: CPT

## 2023-04-28 PROCEDURE — 36415 COLL VENOUS BLD VENIPUNCTURE: CPT

## 2023-04-28 PROCEDURE — 83036 HEMOGLOBIN GLYCOSYLATED A1C: CPT

## 2023-04-28 PROCEDURE — 80053 COMPREHEN METABOLIC PANEL: CPT

## 2023-05-03 ENCOUNTER — TELEPHONE (OUTPATIENT)
Dept: PRIMARY CARE | Facility: CLINIC | Age: 68
End: 2023-05-03

## 2023-05-03 ENCOUNTER — TELEMEDICINE (OUTPATIENT)
Dept: PRIMARY CARE | Facility: CLINIC | Age: 68
End: 2023-05-03
Payer: COMMERCIAL

## 2023-05-03 VITALS — BODY MASS INDEX: 31.98 KG/M2 | HEIGHT: 68 IN | WEIGHT: 211 LBS

## 2023-05-03 DIAGNOSIS — J44.1 COPD EXACERBATION (MULTI): ICD-10-CM

## 2023-05-03 DIAGNOSIS — E78.2 MIXED HYPERLIPIDEMIA: ICD-10-CM

## 2023-05-03 DIAGNOSIS — J44.89 COPD WITH CHRONIC BRONCHITIS (MULTI): ICD-10-CM

## 2023-05-03 DIAGNOSIS — I48.0 PAROXYSMAL ATRIAL FIBRILLATION WITH RVR (MULTI): ICD-10-CM

## 2023-05-03 DIAGNOSIS — I10 ESSENTIAL HYPERTENSION: ICD-10-CM

## 2023-05-03 DIAGNOSIS — E11.9 TYPE 2 DIABETES MELLITUS WITHOUT COMPLICATION, WITHOUT LONG-TERM CURRENT USE OF INSULIN (MULTI): Primary | ICD-10-CM

## 2023-05-03 PROCEDURE — 99213 OFFICE O/P EST LOW 20 MIN: CPT | Performed by: FAMILY MEDICINE

## 2023-05-03 RX ORDER — NITROFURANTOIN 25; 75 MG/1; MG/1
100 CAPSULE ORAL EVERY 12 HOURS SCHEDULED
COMMUNITY
Start: 2023-05-01 | End: 2023-10-06 | Stop reason: SDUPTHER

## 2023-05-03 ASSESSMENT — PATIENT HEALTH QUESTIONNAIRE - PHQ9
2. FEELING DOWN, DEPRESSED OR HOPELESS: NOT AT ALL
1. LITTLE INTEREST OR PLEASURE IN DOING THINGS: NOT AT ALL
SUM OF ALL RESPONSES TO PHQ9 QUESTIONS 1 AND 2: 0

## 2023-05-03 ASSESSMENT — ENCOUNTER SYMPTOMS
LOSS OF SENSATION IN FEET: 0
DEPRESSION: 0
OCCASIONAL FEELINGS OF UNSTEADINESS: 0

## 2023-05-03 NOTE — PROGRESS NOTES
Chief Complaint   Patient presents with    Follow-up     Diabetes, Hyperlipidemia and labs     Patient presents for follow up of diabetes. Current symptoms include: none. Patient denies foot ulcerations, hypoglycemia , increased appetite, nausea, polydipsia, polyuria, visual disturbances, vomiting, and weight loss. Evaluation to date has included: fasting blood sugar, fasting lipid panel, and hemoglobin A1C. Home sugars: patient does not check sugars.     Past Medical History:   Diagnosis Date    Personal history of other diseases of the circulatory system 06/30/2021    History of hypertension    Personal history of other diseases of the digestive system 03/08/2017    History of gastrointestinal hemorrhage    Personal history of other diseases of the digestive system 03/08/2017    History of small bowel obstruction    Personal history of other specified conditions 06/30/2021    History of chest pain    Unspecified atrial fibrillation (CMS/[facility]) 11/25/2019    Atrial fibrillation with RVR     Patient Active Problem List    Diagnosis Date Noted    Acute pain of right shoulder 04/26/2023    Acute UTI 04/26/2023    Atrial flutter (CMS/HCC) 04/26/2023    Atypical chest pain 04/26/2023    Bilateral leg edema 04/26/2023    BPPV (benign paroxysmal positional vertigo) 04/26/2023    Bucket handle tear of lateral meniscus of right knee 04/26/2023    Burning with urination 04/26/2023    Dysuria 04/26/2023    Cellulitis of foot, left 04/26/2023    Chronic fatigue 04/26/2023    Chronic low back pain 04/26/2023    Chronic pain of both hips 04/26/2023    Flank pain 04/26/2023    Chronic pain of left knee 04/26/2023    Colon polyps 04/26/2023    Community acquired pneumonia 04/26/2023    Contact dermatitis due to poison vine 04/26/2023    Contusion of left foot 04/26/2023    COPD exacerbation (CMS/HCC) 04/26/2023    COPD with chronic bronchitis (CMS/HCC) 04/26/2023    Decreased mobility 04/26/2023    Essential hypertension 04/26/2023     Gastro-esophageal reflux disease without esophagitis 04/26/2023    Generalized anxiety disorder 04/26/2023    Hematuria, gross 04/26/2023    Hyperglycemia 04/26/2023    Impaired fasting glucose 04/26/2023    Increased urinary frequency 04/26/2023    Instability of left knee joint 04/26/2023    Jejunitis 04/26/2023    Lateral epicondylitis of left elbow 04/26/2023    Acute GI bleeding 04/26/2023    Lower gastrointestinal bleeding 04/26/2023    Lumbar radiculopathy 04/26/2023    Lumbar spondylosis with myelopathy 04/26/2023    Anemia due to blood loss 04/26/2023    Chronic anemia 04/26/2023    Chronic insomnia 04/26/2023    Macrocytic anemia 04/26/2023    Microalbuminuria 04/26/2023    Mixed hyperlipidemia 04/26/2023    Moderate episode of recurrent major depressive disorder (CMS/HCC) 04/26/2023    Muscle strain, shoulder region, right, initial encounter 04/26/2023    Neck pain on right side 04/26/2023    Numbness and tingling of right upper extremity 04/26/2023    Obesity 04/26/2023    Paroxysmal atrial fibrillation with RVR (CMS/HCC) 04/26/2023    Presence of Watchman left atrial appendage closure device 04/26/2023    Primary localized osteoarthrosis of multiple sites 04/26/2023    Primary osteoarthritis of both hips 04/26/2023    Recurrent nephrolithiasis 04/26/2023    Recurrent UTI 04/26/2023    Renal colic on left side 04/26/2023    Sciatica 04/26/2023    Dyspnea on minimal exertion 04/26/2023    Shortness of breath 04/26/2023    Status post THR (total hip replacement) 04/26/2023    Symptoms of urinary tract infection 04/26/2023    Tear of lateral meniscus of left knee, current 04/26/2023    Type 2 diabetes mellitus without complication, without long-term current use of insulin (CMS/HCC) 04/26/2023    Urethral stricture 04/26/2023    Vitamin B12 deficiency 04/26/2023     Past Surgical History:   Procedure Laterality Date    APPENDECTOMY  03/08/2017    Appendectomy    CT ABDOMEN PELVIS ANGIOGRAM W AND/OR WO IV  CONTRAST  9/7/2022    CT ABDOMEN PELVIS ANGIOGRAM W AND/OR WO IV CONTRAST 9/7/2022 [facility]    HYSTERECTOMY  03/08/2017    Hysterectomy    OTHER SURGICAL HISTORY  07/21/2022    Colonoscopy    OTHER SURGICAL HISTORY  01/23/2021    Hip replacement    OTHER SURGICAL HISTORY  12/22/2022    Atrial appendage closure device insertion    OTHER SURGICAL HISTORY  03/15/2021    Renal lithotripsy    OTHER SURGICAL HISTORY  01/27/2022    Colonic polypectomy    OTHER SURGICAL HISTORY  01/27/2022    Urethral dilation    OTHER SURGICAL HISTORY  12/27/2021    Bladder surgery    OTHER SURGICAL HISTORY  12/27/2021    Complete colonoscopy    OTHER SURGICAL HISTORY  12/27/2021    Tubal ligation    OTHER SURGICAL HISTORY  12/27/2021    Tooth extraction    OTHER SURGICAL HISTORY  12/27/2021    [place] tooth extraction    OTHER SURGICAL HISTORY  12/27/2021    Esophagogastroduodenoscopy    ROTATOR CUFF REPAIR  03/08/2017    Rotator Cuff Repair     No family history on file.    Social History     Socioeconomic History    Marital status: Single     Spouse name: None    Number of children: None    Years of education: None    Highest education level: None   Occupational History    None   Tobacco Use    Smoking status: Never     Passive exposure: Never    Smokeless tobacco: Never   Vaping Use    Vaping status: None   Substance and Sexual Activity    Alcohol use: Never    Drug use: None    Sexual activity: None   Other Topics Concern    None   Social History Narrative    None     Social Determinants of Health     Financial Resource Strain: Not on file   Food Insecurity: Not on file   Transportation Needs: Not on file   Physical Activity: Not on file   Stress: Not on file   Social Connections: Not on file   Intimate Partner Violence: Not on file   Housing Stability: Not on file     Current Outpatient Medications   Medication Sig Dispense Refill    albuterol 90 mcg/actuation inhaler Inhale 2 puffs into the lungs every 6 hours as needed  for Wheezing      aspirin 81 mg chewable tablet CHEW AND SWALLOW 1 TABLET DAILY.      nitrofurantoin, macrocrystal-monohydrate, (Macrobid) 100 mg capsule Take 1 capsule (100 mg) by mouth every 12 hours.      omeprazole (PriLOSEC) 40 mg DR capsule Take 1 capsule (40 mg) by mouth once daily. Do not crush or chew. 30 capsule 5    OneTouch Ultra Test strip use 1 strip to check glucose once daily      tiZANidine (Zanaflex) 4 mg tablet TAKE 1 TABLET BY MOUTH NIGHTLY AS NEEDED FOR MUSCLE SPASM       No current facility-administered medications for this visit.     Current Outpatient Medications on File Prior to Visit   Medication Sig Dispense Refill    albuterol 90 mcg/actuation inhaler Inhale 2 puffs into the lungs every 6 hours as needed for Wheezing      aspirin 81 mg chewable tablet CHEW AND SWALLOW 1 TABLET DAILY.      nitrofurantoin, macrocrystal-monohydrate, (Macrobid) 100 mg capsule Take 1 capsule (100 mg) by mouth every 12 hours.      omeprazole (PriLOSEC) 40 mg DR capsule Take 1 capsule (40 mg) by mouth once daily. Do not crush or chew. 30 capsule 5    OneTouch Ultra Test strip use 1 strip to check glucose once daily      tiZANidine (Zanaflex) 4 mg tablet TAKE 1 TABLET BY MOUTH NIGHTLY AS NEEDED FOR MUSCLE SPASM       No current facility-administered medications on file prior to visit.     Allergies   Allergen Reactions    Adhesive Tape-Silicones Unknown    Atorvastatin Other     myalgia          General ROS: negative for - chills, fatigue, fever, malaise, night sweats, weight gain, or weight loss  Psychological ROS: negative for - anxiety, behavioral disorder, concentration difficulties, depression,   hallucinations, irritability, memory difficulties, mood swings, obsessive thoughts, sleep disturbances, or suicidal ideation  Ophthalmic ROS: negative for - blurry vision, double vision, dry eyes, eye pain, itchy eyes, loss of vision, photophobia, or scotomata  ENT ROS: negative for - epistaxis, hearing change,  "nasal congestion, nasal discharge, sneezing, sore throat, tinnitus, or vocal changes  Respiratory ROS: negative for - cough, hemoptysis, shortness of breath, or wheezing  Cardiovascular ROS: negative for - chest pain, dyspnea on exertion, irregular heartbeat, loss of consciousness, orthopnea,   palpitations, paroxysmal nocturnal dyspnea, or rapid heart rate  Gastrointestinal ROS: negative for - abdominal pain, appetite loss, blood in stools, constipation, diarrhea, hematemesis, melena, or nausea/vomiting  Genitourinary ROS: negative for - dysuria, hematuria, nocturia, or urinary frequency/urgency  Musculoskeletal ROS: negative for - gait disturbance, joint pain, joint stiffness, joint swelling, muscle pain, or muscular weakness  Neurological ROS: negative for - confusion, dizziness, gait disturbance, headaches, impaired coordination/balance,   memory loss, numbness/tingling, seizures, speech problems, tremors, or visual changes  Diabetic foot exam: Has full complement of pulses in both feet including dorsalis pedis and posterior tibial artery.  Has calluses and preulcerative calluses.  Sensation diminished in 10 out of 10 monofilament testing on the right side and 10 out of 10 on the left side.  Normal proprioception and vibration sense.  Both feet are warm to touch.  No ulceration.        Objective:  Height 1.727 m (5' 8\"), weight 95.7 kg (211 lb).    Physical Examination: General appearance - alert, well appearing, and in no distress  Mental status - alert, oriented to person, place, and time  Eyes - pupils equal and reactive, extraocular eye movements intact  Ears - bilateral TM's and external ear canals normal  Mouth - mucous membranes moist, pharynx normal without lesions  Neck - supple, no significant adenopathy  Lymphatics - no palpable lymphadenopathy, no hepatosplenomegaly  Chest - clear to auscultation, no wheezes, rales or rhonchi, symmetric air entry  Heart - normal rate, regular rhythm, normal S1, S2, " "no murmurs, rubs, clicks or gallops  Abdomen - soft, nontender, nondistended, no masses or organomegaly  Neurological - alert, oriented, normal speech, no focal findings or movement disorder noted  Musculoskeletal - no joint tenderness, deformity or swelling  Extremities: peripheral pulses normal, no pedal edema, no clubbing or cyanosis.      Reviewed items (Optional):07382}    Problem List Items Addressed This Visit       COPD exacerbation (CMS/[facility])    Current Assessment & Plan     Chronic Condition Documentation : Stable based on symptoms and exam.  Continue established treatment plan and follow-up at least yearly.           COPD with chronic bronchitis (CMS/[facility])    Current Assessment & Plan     Chronic Condition Documentation : Stable based on symptoms and exam.  Continue established treatment plan and follow-up at least yearly.           Essential hypertension    Current Assessment & Plan     Dietary sodium restriction.  Regular aerobic exercise program is recommended to help achieve and maintain normal body weight, fitness and improve lipid balance. .  Dietary changes: Increase soluble fiber  Plant sterols 2grams per day (e.g. Benecol)  Reduce saturated fat, \"trans\" monounsaturated fatty acids, and cholesterol           Relevant Orders    Comprehensive Metabolic Panel    Hemoglobin A1C    Lipid Panel    Follow Up In [facility] - PCP    Mixed hyperlipidemia    Current Assessment & Plan     The nature of cardiac risk has been fully discussed with this patient. Discussed cardiovascular risk analysis and appropriate diet with the need for lifelong measures to reduce the risk. A regular exercise program is recommended to help achieve and maintain normal body weight, fitness and improve lipid balance. Patient education provided. They understand and agree with this course of treatment. They will return with new or worsening symptoms. Patient instructed to remain current with appropriate annual health " maintenance.            Relevant Orders    Comprehensive Metabolic Panel    Hemoglobin A1C    Lipid Panel    Follow Up In [facility] - PCP    Paroxysmal atrial fibrillation with RVR (CMS/[facility])    Current Assessment & Plan     Chronic Condition Documentation : Stable based on symptoms and exam.  Continue established treatment plan and follow-up at least yearly.           Type 2 diabetes mellitus without complication, without long-term current use of insulin (CMS/[facility]) - Primary    Current Assessment & Plan     Diabetes Mellitus type II, under inadequate control.  1. Rx changes: None  2. Education: Reviewed ‘ABCs’ of diabetes management (respective goals in parentheses):  A1C (<7), blood pressure (<130/80), and cholesterol (LDL <100).  3. Compliance at present is estimated to be inadequate. Efforts to improve compliance (if necessary) will be directed at dietary modifications: and increased exercise.  4. Follow up: 3 months           Relevant Orders    Comprehensive Metabolic Panel    Hemoglobin A1C    Lipid Panel    Follow Up In [facility] - PCP        Lab on 04/28/2023   Component Date Value Ref Range Status    WBC 04/28/2023 5.9  4.4 - 11.3 x10E9/L Final    nRBC 04/28/2023 0.0  0.0 - 0.0 /100 WBC Final    RBC 04/28/2023 4.76  4.00 - 5.20 x10E12/L Final    Hemoglobin 04/28/2023 12.3  12.0 - 16.0 g/dL Final    Hematocrit 04/28/2023 41.1  36.0 - 46.0 % Final    MCV 04/28/2023 86  80 - 100 fL Final    MCHC 04/28/2023 29.9 (L)  32.0 - 36.0 g/dL Final    Platelets 04/28/2023 307  150 - 450 x10E9/L Final    RDW 04/28/2023 14.9 (H)  11.5 - 14.5 % Final    Neutrophils % 04/28/2023 45.8  40.0 - 80.0 % Final    Immature Granulocytes %, Automated 04/28/2023 0.3  0.0 - 0.9 % Final     Immature Granulocyte Count (IG) includes promyelocytes,    myelocytes and metamyelocytes but does not include bands.   Percent differential counts (%) should be interpreted in the   context of the absolute cell counts  (cells/L).    Lymphocytes % 04/28/2023 44.2  13.0 - 44.0 % Final    Monocytes % 04/28/2023 7.5  2.0 - 10.0 % Final    Eosinophils % 04/28/2023 1.9  0.0 - 6.0 % Final    Basophils % 04/28/2023 0.3  0.0 - 2.0 % Final    Neutrophils Absolute 04/28/2023 2.70  1.20 - 7.70 x10E9/L Final    Lymphocytes Absolute 04/28/2023 2.61  1.20 - 4.80 x10E9/L Final    Monocytes Absolute 04/28/2023 0.44  0.10 - 1.00 x10E9/L Final    Eosinophils Absolute 04/28/2023 0.11  0.00 - 0.70 x10E9/L Final    Basophils Absolute 04/28/2023 0.02  0.00 - 0.10 x10E9/L Final    Glucose 04/28/2023 116 (H)  74 - 99 mg/dL Final    Sodium 04/28/2023 141  136 - 145 mmol/L Final    Potassium 04/28/2023 4.0  3.5 - 5.3 mmol/L Final    Chloride 04/28/2023 104  98 - 107 mmol/L Final    Bicarbonate 04/28/2023 28  21 - 32 mmol/L Final    Anion Gap 04/28/2023 13  10 - 20 mmol/L Final    Urea Nitrogen 04/28/2023 13  6 - 23 mg/dL Final    Creatinine 04/28/2023 0.78  0.50 - 1.05 mg/dL Final    GFR Female 04/28/2023 84  >90 mL/min/1.73m2 Final     CALCULATIONS OF ESTIMATED GFR ARE PERFORMED   USING THE 2021 CKD-EPI STUDY REFIT EQUATION   WITHOUT THE RACE VARIABLE FOR THE IDMS-TRACEABLE   CREATININE METHODS.    https://jasn.asnjournals.org/content/early/2021/09/22/ASN.9096870315    Calcium 04/28/2023 9.4  8.6 - 10.6 mg/dL Final    Albumin 04/28/2023 4.3  3.4 - 5.0 g/dL Final    Alkaline Phosphatase 04/28/2023 105  33 - 136 U/L Final    Total Protein 04/28/2023 7.1  6.4 - 8.2 g/dL Final    AST 04/28/2023 18  9 - 39 U/L Final    Total Bilirubin 04/28/2023 0.4  0.0 - 1.2 mg/dL Final    ALT (SGPT) 04/28/2023 14  7 - 45 U/L Final     Patients treated with Sulfasalazine may generate    falsely decreased results for ALT.    Cholesterol 04/28/2023 182  0 - 199 mg/dL Final    .      AGE      DESIRABLE   BORDERLINE HIGH   HIGH     0-19 Y     0 - 169       170 - 199     >/= 200    20-24 Y     0 - 189       190 - 224     >/= 225         >24 Y     0 - 199       200 - 239     >/=  240   **All ranges are based on fasting samples. Specific   therapeutic targets will vary based on patient-specific   cardiac risk.  .   Pediatric guidelines reference:Pediatrics 2011, 128(S5).   Adult guidelines reference: NCEP ATPIII Guidelines,     FELA 2001, 258:2486-97  .   Venipuncture immediately after or during the    administration of Metamizole may lead to falsely   low results. Testing should be performed immediately   prior to Metamizole dosing.    HDL 04/28/2023 41.7  mg/dL Final    .      AGE      VERY LOW   LOW     NORMAL    HIGH       0-19 Y       < 35   < 40     40-45     ----    20-24 Y       ----   < 40       >45     ----      >24 Y       ----   < 40     40-60      >60  .    Cholesterol/HDL Ratio 04/28/2023 4.4   Final    REF VALUES  DESIRABLE  < 3.4  HIGH RISK  > 5.0    LDL 04/28/2023 95  0 - 99 mg/dL Final    .                           NEAR      BORD      AGE      DESIRABLE  OPTIMAL    HIGH     HIGH     VERY HIGH     0-19 Y     0 - 109     ---    110-129   >/= 130     ----    20-24 Y     0 - 119     ---    120-159   >/= 160     ----      >24 Y     0 -  99   100-129  130-159   160-189     >/=190  .    VLDL 04/28/2023 45 (H)  0 - 40 mg/dL Final    Triglycerides 04/28/2023 226 (H)  0 - 149 mg/dL Final    .      AGE      DESIRABLE   BORDERLINE HIGH   HIGH     VERY HIGH   0 D-90 D    19 - 174         ----         ----        ----  91 D- 9 Y     0 -  74        75 -  99     >/= 100      ----    10-19 Y     0 -  89        90 - 129     >/= 130      ----    20-24 Y     0 - 114       115 - 149     >/= 150      ----         >24 Y     0 - 149       150 - 199    200- 499    >/= 500  .   Venipuncture immediately after or during the    administration of Metamizole may lead to falsely   low results. Testing should be performed immediately   prior to Metamizole dosing.    Non HDL Cholesterol 04/28/2023 140  mg/dL Final        AGE      DESIRABLE   BORDERLINE HIGH   HIGH     VERY HIGH     0-19 Y     0 - 119        "120 - 144     >/= 145    >/= 160    20-24 Y     0 - 149       150 - 189     >/= 190      ----         >24 Y    30 MG/DL ABOVE LDL CHOLESTEROL GOAL  .    Hemoglobin A1C 04/28/2023 6.7 (A)  % Final         Diagnosis of Diabetes-Adults   Non-Diabetic: < or = 5.6%   Increased risk for developing diabetes: 5.7-6.4%   Diagnostic of diabetes: > or = 6.5%  .       Monitoring of Diabetes                Age (y)     Therapeutic Goal (%)   Adults:          >18           <7.0   Pediatrics:    13-18           <7.5                   7-12           <8.0                   0- 6            7.5-8.5   American Diabetes Association. Diabetes Care 33(S1), Jan 2010.    Estimated Average Glucose 04/28/2023 146  MG/DL Final          Assessment:  Problem List Items Addressed This Visit       COPD exacerbation (CMS/[facility])    Current Assessment & Plan     Chronic Condition Documentation : Stable based on symptoms and exam.  Continue established treatment plan and follow-up at least yearly.           COPD with chronic bronchitis (CMS/[facility])    Current Assessment & Plan     Chronic Condition Documentation : Stable based on symptoms and exam.  Continue established treatment plan and follow-up at least yearly.           Essential hypertension    Current Assessment & Plan     Dietary sodium restriction.  Regular aerobic exercise program is recommended to help achieve and maintain normal body weight, fitness and improve lipid balance. .  Dietary changes: Increase soluble fiber  Plant sterols 2grams per day (e.g. Benecol)  Reduce saturated fat, \"trans\" monounsaturated fatty acids, and cholesterol           Relevant Orders    Comprehensive Metabolic Panel    Hemoglobin A1C    Lipid Panel    Follow Up In [facility] - PCP    Mixed hyperlipidemia    Current Assessment & Plan     The nature of cardiac risk has been fully discussed with this patient. Discussed cardiovascular risk analysis and appropriate diet with the need for lifelong measures to " reduce the risk. A regular exercise program is recommended to help achieve and maintain normal body weight, fitness and improve lipid balance. Patient education provided. They understand and agree with this course of treatment. They will return with new or worsening symptoms. Patient instructed to remain current with appropriate annual health maintenance.            Relevant Orders    Comprehensive Metabolic Panel    Hemoglobin A1C    Lipid Panel    Follow Up In [facility] - PCP    Paroxysmal atrial fibrillation with RVR (CMS/[facility])    Current Assessment & Plan     Chronic Condition Documentation : Stable based on symptoms and exam.  Continue established treatment plan and follow-up at least yearly.           Type 2 diabetes mellitus without complication, without long-term current use of insulin (CMS/[facility]) - Primary    Current Assessment & Plan     Diabetes Mellitus type II, under inadequate control.  1. Rx changes: None  2. Education: Reviewed ‘ABCs’ of diabetes management (respective goals in parentheses):  A1C (<7), blood pressure (<130/80), and cholesterol (LDL <100).  3. Compliance at present is estimated to be inadequate. Efforts to improve compliance (if necessary) will be directed at dietary modifications: and increased exercise.  4. Follow up: 3 months           Relevant Orders    Comprehensive Metabolic Panel    Hemoglobin A1C    Lipid Panel    Follow Up In [facility] - PCP        Outpatient Encounter Medications as of 5/3/2023   Medication Sig Dispense Refill    albuterol 90 mcg/actuation inhaler Inhale 2 puffs into the lungs every 6 hours as needed for Wheezing      aspirin 81 mg chewable tablet CHEW AND SWALLOW 1 TABLET DAILY.      nitrofurantoin, macrocrystal-monohydrate, (Macrobid) 100 mg capsule Take 1 capsule (100 mg) by mouth every 12 hours.      omeprazole (PriLOSEC) 40 mg DR capsule Take 1 capsule (40 mg) by mouth once daily. Do not crush or chew. 30 capsule 5    OneTouch Ultra  Test strip use 1 strip to check glucose once daily      tiZANidine (Zanaflex) 4 mg tablet TAKE 1 TABLET BY MOUTH NIGHTLY AS NEEDED FOR MUSCLE SPASM       No facility-administered encounter medications on file as of 5/3/2023.      Discussed general issues about diabetes pathophysiology and management.  Addressed ADA diet.  Suggested low cholesterol diet.  Encouraged aerobic exercise.  Discussed foot care.  Reminded to get yearly retinal exam.  Discussed ways to avoid symptomatic hypoglycemia.   We will schedule fasting lipid panel CMP and HA1C in 3 months     Scribe Attestation  By signing my name below, I, Maria Del Rosario Juan   attest that this documentation has been prepared under the direction and in the presence of Hany Leo MD.

## 2023-05-03 NOTE — ASSESSMENT & PLAN NOTE
Diabetes Mellitus type II, under inadequate control.  1. Rx changes: None  2. Education: Reviewed ‘ABCs’ of diabetes management (respective goals in parentheses):  A1C (<7), blood pressure (<130/80), and cholesterol (LDL <100).  3. Compliance at present is estimated to be inadequate. Efforts to improve compliance (if necessary) will be directed at dietary modifications: and increased exercise.  4. Follow up: 3 months

## 2023-05-21 DIAGNOSIS — E11.9 TYPE 2 DIABETES MELLITUS WITHOUT COMPLICATION, WITHOUT LONG-TERM CURRENT USE OF INSULIN (MULTI): ICD-10-CM

## 2023-05-23 RX ORDER — LINAGLIPTIN 5 MG/1
TABLET, FILM COATED ORAL
Qty: 90 TABLET | Refills: 0 | OUTPATIENT
Start: 2023-05-23

## 2023-06-07 DIAGNOSIS — E78.2 MIXED HYPERLIPIDEMIA: Primary | ICD-10-CM

## 2023-06-08 RX ORDER — ATORVASTATIN CALCIUM 20 MG/1
TABLET, FILM COATED ORAL
Qty: 30 TABLET | Refills: 0 | OUTPATIENT
Start: 2023-06-08

## 2023-07-25 ENCOUNTER — LAB (OUTPATIENT)
Dept: LAB | Facility: LAB | Age: 68
End: 2023-07-25
Payer: COMMERCIAL

## 2023-07-25 DIAGNOSIS — E11.9 TYPE 2 DIABETES MELLITUS WITHOUT COMPLICATION, WITHOUT LONG-TERM CURRENT USE OF INSULIN (MULTI): ICD-10-CM

## 2023-07-25 DIAGNOSIS — I10 ESSENTIAL HYPERTENSION: ICD-10-CM

## 2023-07-25 DIAGNOSIS — E78.2 MIXED HYPERLIPIDEMIA: ICD-10-CM

## 2023-07-25 LAB
ALANINE AMINOTRANSFERASE (SGPT) (U/L) IN SER/PLAS: 18 U/L (ref 7–45)
ALBUMIN (G/DL) IN SER/PLAS: 4.3 G/DL (ref 3.4–5)
ALKALINE PHOSPHATASE (U/L) IN SER/PLAS: 89 U/L (ref 33–136)
ANION GAP IN SER/PLAS: 10 MMOL/L (ref 10–20)
APPEARANCE, URINE: CLEAR
ASPARTATE AMINOTRANSFERASE (SGOT) (U/L) IN SER/PLAS: 19 U/L (ref 9–39)
BILIRUBIN TOTAL (MG/DL) IN SER/PLAS: 0.4 MG/DL (ref 0–1.2)
BILIRUBIN, URINE: NEGATIVE
BLOOD, URINE: ABNORMAL
CALCIUM (MG/DL) IN SER/PLAS: 9.3 MG/DL (ref 8.6–10.3)
CARBON DIOXIDE, TOTAL (MMOL/L) IN SER/PLAS: 27 MMOL/L (ref 21–32)
CHLORIDE (MMOL/L) IN SER/PLAS: 106 MMOL/L (ref 98–107)
CHOLESTEROL (MG/DL) IN SER/PLAS: 153 MG/DL (ref 0–199)
CHOLESTEROL IN HDL (MG/DL) IN SER/PLAS: 42.6 MG/DL
CHOLESTEROL/HDL RATIO: 3.6
COLOR, URINE: YELLOW
CREATININE (MG/DL) IN SER/PLAS: 0.81 MG/DL (ref 0.5–1.05)
ESTIMATED AVERAGE GLUCOSE FOR HBA1C: 148 MG/DL
GFR FEMALE: 80 ML/MIN/1.73M2
GLUCOSE (MG/DL) IN SER/PLAS: 117 MG/DL (ref 74–99)
GLUCOSE, URINE: NEGATIVE MG/DL
HEMOGLOBIN A1C/HEMOGLOBIN TOTAL IN BLOOD: 6.8 %
KETONES, URINE: NEGATIVE MG/DL
LDL: 81 MG/DL (ref 0–99)
LEUKOCYTE ESTERASE, URINE: ABNORMAL
NITRITE, URINE: NEGATIVE
PH, URINE: 6 (ref 5–8)
POTASSIUM (MMOL/L) IN SER/PLAS: 4.1 MMOL/L (ref 3.5–5.3)
PROTEIN TOTAL: 6.9 G/DL (ref 6.4–8.2)
PROTEIN, URINE: NEGATIVE MG/DL
RBC, URINE: 4 /HPF (ref 0–5)
SODIUM (MMOL/L) IN SER/PLAS: 139 MMOL/L (ref 136–145)
SPECIFIC GRAVITY, URINE: 1.02 (ref 1–1.03)
SQUAMOUS EPITHELIAL CELLS, URINE: 1 /HPF
TRIGLYCERIDE (MG/DL) IN SER/PLAS: 149 MG/DL (ref 0–149)
UREA NITROGEN (MG/DL) IN SER/PLAS: 14 MG/DL (ref 6–23)
UROBILINOGEN, URINE: <2 MG/DL (ref 0–1.9)
VLDL: 30 MG/DL (ref 0–40)
WBC, URINE: 15 /HPF (ref 0–5)

## 2023-07-25 PROCEDURE — 83036 HEMOGLOBIN GLYCOSYLATED A1C: CPT

## 2023-07-25 PROCEDURE — 80061 LIPID PANEL: CPT

## 2023-07-25 PROCEDURE — 36415 COLL VENOUS BLD VENIPUNCTURE: CPT

## 2023-07-25 PROCEDURE — 80053 COMPREHEN METABOLIC PANEL: CPT

## 2023-07-26 LAB — URINE CULTURE: NORMAL

## 2023-08-03 ENCOUNTER — OFFICE VISIT (OUTPATIENT)
Dept: PRIMARY CARE | Facility: CLINIC | Age: 68
End: 2023-08-03
Payer: COMMERCIAL

## 2023-08-03 VITALS
DIASTOLIC BLOOD PRESSURE: 74 MMHG | RESPIRATION RATE: 18 BRPM | HEIGHT: 69 IN | WEIGHT: 212.8 LBS | SYSTOLIC BLOOD PRESSURE: 120 MMHG | OXYGEN SATURATION: 98 % | TEMPERATURE: 97.6 F | BODY MASS INDEX: 31.52 KG/M2 | HEART RATE: 81 BPM

## 2023-08-03 DIAGNOSIS — E11.9 TYPE 2 DIABETES MELLITUS WITHOUT COMPLICATION, WITHOUT LONG-TERM CURRENT USE OF INSULIN (MULTI): Primary | ICD-10-CM

## 2023-08-03 DIAGNOSIS — J44.89 COPD WITH CHRONIC BRONCHITIS (MULTI): ICD-10-CM

## 2023-08-03 DIAGNOSIS — K21.9 GASTRO-ESOPHAGEAL REFLUX DISEASE WITHOUT ESOPHAGITIS: ICD-10-CM

## 2023-08-03 DIAGNOSIS — E11.8 TYPE 2 DIABETES MELLITUS WITH UNSPECIFIED COMPLICATIONS (MULTI): ICD-10-CM

## 2023-08-03 DIAGNOSIS — E78.2 MIXED HYPERLIPIDEMIA: ICD-10-CM

## 2023-08-03 DIAGNOSIS — I10 ESSENTIAL HYPERTENSION: ICD-10-CM

## 2023-08-03 DIAGNOSIS — I48.0 PAROXYSMAL ATRIAL FIBRILLATION WITH RVR (MULTI): ICD-10-CM

## 2023-08-03 DIAGNOSIS — F33.1 MODERATE EPISODE OF RECURRENT MAJOR DEPRESSIVE DISORDER (MULTI): ICD-10-CM

## 2023-08-03 DIAGNOSIS — D64.9 CHRONIC ANEMIA: ICD-10-CM

## 2023-08-03 PROCEDURE — 3074F SYST BP LT 130 MM HG: CPT | Performed by: FAMILY MEDICINE

## 2023-08-03 PROCEDURE — 3078F DIAST BP <80 MM HG: CPT | Performed by: FAMILY MEDICINE

## 2023-08-03 PROCEDURE — 1126F AMNT PAIN NOTED NONE PRSNT: CPT | Performed by: FAMILY MEDICINE

## 2023-08-03 PROCEDURE — 1159F MED LIST DOCD IN RCRD: CPT | Performed by: FAMILY MEDICINE

## 2023-08-03 PROCEDURE — 99214 OFFICE O/P EST MOD 30 MIN: CPT | Performed by: FAMILY MEDICINE

## 2023-08-03 PROCEDURE — 1160F RVW MEDS BY RX/DR IN RCRD: CPT | Performed by: FAMILY MEDICINE

## 2023-08-03 PROCEDURE — 3044F HG A1C LEVEL LT 7.0%: CPT | Performed by: FAMILY MEDICINE

## 2023-08-03 PROCEDURE — 1036F TOBACCO NON-USER: CPT | Performed by: FAMILY MEDICINE

## 2023-08-03 RX ORDER — SEMAGLUTIDE 1.34 MG/ML
INJECTION, SOLUTION SUBCUTANEOUS
Qty: 1 EACH | Refills: 2 | Status: SHIPPED | OUTPATIENT
Start: 2023-08-03 | End: 2023-11-07 | Stop reason: ALTCHOICE

## 2023-08-03 RX ORDER — OMEPRAZOLE 40 MG/1
40 CAPSULE, DELAYED RELEASE ORAL DAILY
Qty: 30 CAPSULE | Refills: 5 | Status: SHIPPED | OUTPATIENT
Start: 2023-08-03 | End: 2024-02-05 | Stop reason: SDUPTHER

## 2023-08-03 ASSESSMENT — PATIENT HEALTH QUESTIONNAIRE - PHQ9
SUM OF ALL RESPONSES TO PHQ9 QUESTIONS 1 AND 2: 0
1. LITTLE INTEREST OR PLEASURE IN DOING THINGS: NOT AT ALL
2. FEELING DOWN, DEPRESSED OR HOPELESS: NOT AT ALL

## 2023-08-03 NOTE — PROGRESS NOTES
Chief Complaint   Patient presents with    Follow-up     Diabetes  Hypertension  Hyperlipidemia       Bella Leone is a 68 y.o. female who presents today for follow up on diabetes, hypertension, hyperlipidemia, labs and other chronic medical conditions.     She presents today for follow up on Diabetes Mellitus, hypertension, hyperlipidemia . Current  diabetes related symptoms include: none. Patient denies foot ulcerations, hypoglycemia , nausea, paresthesia of the feet, polydipsia, polyuria, visual disturbances, vomiting, and weight loss.  Patient denies chest pain, claudication, dyspnea, exertional chest pressure/discomfort, irregular heart beat, lower extremity edema, orthopnea, palpitations, paroxysmal nocturnal dyspnea, and syncope. Evaluation to date has included: fasting blood sugar, fasting lipid panel, hemoglobin A1C, and microalbuminuria.  Home sugars: BGs consistently in an acceptable range.   Patient denies chest pain, claudication, dyspnea, exertional chest pressure/discomfort, irregular heart beat, lower extremity edema, orthopnea, palpitations, paroxysmal nocturnal dyspnea, and syncope.      Bella Leone is a 68 y.o. female who presents with knee pain involving both knees. Onset was gradual, starting about 5 months ago. Inciting event: none known. Current symptoms include: giving out and stiffness. Pain is aggravated by going up and down stairs, running, and walking. Patient has had prior knee problems. Evaluation to date: none. Treatment to date: none.    Patient Active Problem List   Diagnosis    Acute pain of right shoulder    Acute UTI    Atrial flutter (CMS/HCC)    Atypical chest pain    Bilateral leg edema    BPPV (benign paroxysmal positional vertigo)    Bucket handle tear of lateral meniscus of right knee    Burning with urination    Dysuria    Cellulitis of foot, left    Chronic fatigue    Chronic low back pain    Chronic pain of both hips    Flank pain    Chronic pain of left knee     Colon polyps    Community acquired pneumonia    Contact dermatitis due to poison vine    Contusion of left foot    COPD exacerbation (CMS/HCC)    COPD with chronic bronchitis (CMS/HCC)    Decreased mobility    Essential hypertension    Gastro-esophageal reflux disease without esophagitis    Generalized anxiety disorder    Hematuria, gross    Hyperglycemia    Impaired fasting glucose    Increased urinary frequency    Instability of left knee joint    Jejunitis    Lateral epicondylitis of left elbow    Acute GI bleeding    Lower gastrointestinal bleeding    Lumbar radiculopathy    Lumbar spondylosis with myelopathy    Anemia due to blood loss    Chronic anemia    Chronic insomnia    Macrocytic anemia    Microalbuminuria    Mixed hyperlipidemia    Moderate episode of recurrent major depressive disorder (CMS/HCC)    Muscle strain, shoulder region, right, initial encounter    Neck pain on right side    Numbness and tingling of right upper extremity    Obesity    Paroxysmal atrial fibrillation with RVR (CMS/HCC)    Presence of Watchman left atrial appendage closure device    Primary localized osteoarthrosis of multiple sites    Primary osteoarthritis of both hips    Recurrent nephrolithiasis    Recurrent UTI    Renal colic on left side    Sciatica    Dyspnea on minimal exertion    Shortness of breath    Status post THR (total hip replacement)    Symptoms of urinary tract infection    Tear of lateral meniscus of left knee, current    Type 2 diabetes mellitus with unspecified complications (CMS/HCC)    Urethral stricture    Vitamin B12 deficiency      Past Medical History:   Diagnosis Date    Personal history of other diseases of the circulatory system 06/30/2021    History of hypertension    Personal history of other diseases of the digestive system 03/08/2017    History of gastrointestinal hemorrhage    Personal history of other diseases of the digestive system 03/08/2017    History of small bowel obstruction     Personal history of other specified conditions 06/30/2021    History of chest pain    Unspecified atrial fibrillation (CMS/[facility]) 11/25/2019    Atrial fibrillation with RVR      Current Outpatient Medications   Medication Sig Dispense Refill    albuterol 90 mcg/actuation inhaler Inhale 2 puffs into the lungs every 6 hours as needed for Wheezing      aspirin 81 mg chewable tablet CHEW AND SWALLOW 1 TABLET DAILY.      nitrofurantoin, macrocrystal-monohydrate, (Macrobid) 100 mg capsule Take 1 capsule (100 mg) by mouth every 12 hours.      OneTouch Ultra Test strip use 1 strip to check glucose once daily      omeprazole (PriLOSEC) 40 mg DR capsule Take 1 capsule (40 mg) by mouth once daily. Do not crush or chew. 30 capsule 5    semaglutide (Ozempic) 0.25 mg or 0.5 mg(2 mg/1.5 mL) pen injector Inject 0.25 mg subcutaneous weekly x 4 week then increase to 0.5 mg weekly 1 each 2    tiZANidine (Zanaflex) 4 mg tablet TAKE 1 TABLET BY MOUTH NIGHTLY AS NEEDED FOR MUSCLE SPASM       No current facility-administered medications for this visit.     Allergies   Allergen Reactions    Adhesive Tape-Silicones Unknown    Atorvastatin Other     myalgia     Social History     Tobacco Use    Smoking status: Never     Passive exposure: Never    Smokeless tobacco: Never   Substance Use Topics    Alcohol use: Never      Review of Systems -   General ROS: negative for - fatigue, fever, malaise, night sweats or weight loss  Eyes ROS no blurred vision, no double vision, no eye discharge and no eye redness.  ENT ROS: negative for - hearing change, nasal discharge, oral lesions, sinus pain, sore throat, tinnitus or vertigo  Respiratory ROS: negative for - cough, hemoptysis, pleuritic pain, shortness of breath or wheezing  Cardiovascular ROS: no chest pain or dyspnea on exertion, palpitations, PND or orthopnea.  No syncope.  Gastrointestinal ROS: no abdominal pain, change in bowel habits, or black or bloody stools  Genito-Urinary ROS: no dysuria,  "trouble voiding, or hematuria  Dermatological ROS: negative for - dry skin, lumps, pruritus or rash  Musculoskeletal ROS: negative for - joint pain, joint stiffness, joint swelling, muscle pain or muscular weakness  Neurological ROS: negative for - dizziness, gait disturbance, headaches, impaired coordination/balance, numbness/tingling, tremors or visual changes  Psychological ROS: negative for - anxiety, concentration difficulties, depression, memory difficulties or sleep disturbances  Endocrine ROS: negative for - hot flashes, malaise/lethargy, palpitations, polydipsia/polyuria, skin changes, temperature intolerance   Hematological and Lymphatic ROS: negative for - bruising, night sweats or pallor    Blood pressure 120/74, pulse 81, temperature 36.4 °C (97.6 °F), resp. rate 18, height 1.753 m (5' 9\"), weight 96.5 kg (212 lb 12.8 oz), SpO2 98 %.    Physical Examination:   General appearance - alert, well appearing, and in no distress  HEENT EOMI, PEERLA, normal eyelids.  Oropharynx no erythema or exudate.  Normal tonsils.    Ears -external auditory canal normal bilaterally.  Tympanic membranes normal bilaterally.  Mouth - mucous membranes moist, pharynx normal without lesions  Neck - supple, trachea central no thyromegaly.  No significant adenopathy  Chest -good air entry bilaterally, no rhonchi rales and no wheezes.  Heart -Normal S1 and S2, regular rate and rhythm with no murmurs gallop or rub.  Abdomen -Non distended, soft, nontender with no guarding, no palpable mass and no CVA tenderness.  Bowel sounds normal.  No hernia.  Skin no rash, nonicteric and warm to touch.  Neurological - alert, oriented, cranial nerves II through XII normal.  Reflexes 2+ bilaterally.  No focal deficit.  Musculoskeletal - no joint tenderness, deformity or swelling  Extremities: peripheral pulses normal, no clubbing or cyanosis.    Orders Only on 07/25/2023   Component Date Value Ref Range Status    Urine Culture 07/25/2023 MIXED " "URETHRAL SABAS.   Final    Color, Urine 07/25/2023 YELLOW  STRAW,YELLOW Final    Appearance, Urine 07/25/2023 CLEAR  CLEAR Final    Specific Gravity, Urine 07/25/2023 1.016  1.005 - 1.035 Final    pH, Urine 07/25/2023 6.0  5.0 - 8.0 Final    Protein, Urine 07/25/2023 NEGATIVE  NEGATIVE mg/dL Final    Glucose, Urine 07/25/2023 NEGATIVE  NEGATIVE mg/dL Final    Blood, Urine 07/25/2023 SMALL (1+) (A)  NEGATIVE Final    Ketones, Urine 07/25/2023 NEGATIVE  NEGATIVE mg/dL Final    Bilirubin, Urine 07/25/2023 NEGATIVE  NEGATIVE Final    Urobilinogen, Urine 07/25/2023 <2.0  0.0 - 1.9 mg/dL Final    Nitrite, Urine 07/25/2023 NEGATIVE  NEGATIVE Final    Leukocyte Esterase, Urine 07/25/2023 SMALL (1+) (A)  NEGATIVE Final    WBC, Urine 07/25/2023 15 (A)  0 - 5 /HPF Final    RBC, Urine 07/25/2023 4  0 - 5 /HPF Final    Squamous Epithelial Cells, Urine 07/25/2023 1  /HPF Final     Assessment / Plan:  Problem List Items Addressed This Visit       COPD with chronic bronchitis (CMS/[facility])    Current Assessment & Plan     Chronic Condition Documentation : Stable based on symptoms and exam.  Continue established treatment plan and follow-up at least yearly.           Essential hypertension    Current Assessment & Plan     Dietary sodium restriction.  Regular aerobic exercise program is recommended to help achieve and maintain normal body weight, fitness and improve lipid balance. .  Dietary changes: Increase soluble fiber  Plant sterols 2grams per day (e.g. Benecol)  Reduce saturated fat, \"trans\" monounsaturated fatty acids, and cholesterol           Relevant Orders    Follow Up In [facility] - PCP - Established    Comprehensive metabolic panel    Lipid panel    Gastro-esophageal reflux disease without esophagitis    Relevant Medications    omeprazole (PriLOSEC) 40 mg DR capsule    Chronic anemia    Relevant Orders    CBC and Auto Differential    Mixed hyperlipidemia    Current Assessment & Plan     The nature of cardiac risk " has been fully discussed with this patient. Discussed cardiovascular risk analysis and appropriate diet with the need for lifelong measures to reduce the risk. A regular exercise program is recommended to help achieve and maintain normal body weight, fitness and improve lipid balance. Patient education provided. They understand and agree with this course of treatment. They will return with new or worsening symptoms. Patient instructed to remain current with appropriate annual health maintenance.            Relevant Orders    Follow Up In [facility] - PCP - Established    Comprehensive metabolic panel    Lipid panel    Moderate episode of recurrent major depressive disorder (CMS/[facility])    Current Assessment & Plan     Chronic Condition Documentation : Stable based on symptoms and exam.  Continue established treatment plan and follow-up at least yearly.           Paroxysmal atrial fibrillation with RVR (CMS/[facility])    Current Assessment & Plan     Chronic Condition Documentation : Stable based on symptoms and exam.  Continue established treatment plan and follow-up at least yearly.           Type 2 diabetes mellitus with unspecified complications (CMS/[facility]) - Primary    Current Assessment & Plan     Diabetes Mellitus type II, under good control.  1. Rx changes:   2. Education: Reviewed ‘ABCs’ of diabetes management (respective goals in parentheses):  A1C (<7), blood pressure (<130/80), and cholesterol (LDL <100).  3. Compliance at present is estimated to be good. Efforts to improve compliance (if necessary) will be directed at dietary modifications: and increased exercise.  4. Follow up: 4 months           Relevant Medications    semaglutide (Ozempic) 0.25 mg or 0.5 mg(2 mg/1.5 mL) pen injector     Outpatient Encounter Medications as of 8/3/2023   Medication Sig Dispense Refill    albuterol 90 mcg/actuation inhaler Inhale 2 puffs into the lungs every 6 hours as needed for Wheezing      aspirin 81 mg chewable  tablet CHEW AND SWALLOW 1 TABLET DAILY.      nitrofurantoin, macrocrystal-monohydrate, (Macrobid) 100 mg capsule Take 1 capsule (100 mg) by mouth every 12 hours.      OneTouch Ultra Test strip use 1 strip to check glucose once daily      [DISCONTINUED] omeprazole (PriLOSEC) 40 mg DR capsule Take 1 capsule (40 mg) by mouth once daily. Do not crush or chew. 30 capsule 5    omeprazole (PriLOSEC) 40 mg DR capsule Take 1 capsule (40 mg) by mouth once daily. Do not crush or chew. 30 capsule 5    semaglutide (Ozempic) 0.25 mg or 0.5 mg(2 mg/1.5 mL) pen injector Inject 0.25 mg subcutaneous weekly x 4 week then increase to 0.5 mg weekly 1 each 2    tiZANidine (Zanaflex) 4 mg tablet TAKE 1 TABLET BY MOUTH NIGHTLY AS NEEDED FOR MUSCLE SPASM       No facility-administered encounter medications on file as of 8/3/2023.     Scribe Attestation  By signing my name below, I, Maria Del Rosario Bobby   attest that this documentation has been prepared under the direction and in the presence of Hany Leo MD.

## 2023-10-06 DIAGNOSIS — N39.0 RECURRENT UTI: ICD-10-CM

## 2023-10-06 RX ORDER — NITROFURANTOIN 25; 75 MG/1; MG/1
100 CAPSULE ORAL EVERY 12 HOURS SCHEDULED
Qty: 14 CAPSULE | Refills: 0 | Status: SHIPPED | OUTPATIENT
Start: 2023-10-06 | End: 2023-10-13

## 2023-10-06 NOTE — TELEPHONE ENCOUNTER
Patient called in stating she has a UTI. She stated she has been having urgency, frequency, and lower back pain for 3 days. She stated she is prone to UTI's and knows this is what she has. Patient is wondering if Dr. Leo can send her in an antibiotic to help with this?  [facility] in [place]  Please Advise

## 2023-10-06 NOTE — TELEPHONE ENCOUNTER
Rx for Macrobid 100mg BID x 7 days pended for Dr. Leo   Patient is aware rx will be send over today.

## 2023-10-16 ENCOUNTER — ANCILLARY PROCEDURE (OUTPATIENT)
Dept: RADIOLOGY | Facility: CLINIC | Age: 68
End: 2023-10-16
Payer: COMMERCIAL

## 2023-10-16 ENCOUNTER — OFFICE VISIT (OUTPATIENT)
Dept: ORTHOPEDIC SURGERY | Facility: CLINIC | Age: 68
End: 2023-10-16
Payer: COMMERCIAL

## 2023-10-16 VITALS — HEIGHT: 68 IN | BODY MASS INDEX: 31.83 KG/M2 | WEIGHT: 210 LBS

## 2023-10-16 DIAGNOSIS — M25.511 RIGHT SHOULDER PAIN, UNSPECIFIED CHRONICITY: Primary | ICD-10-CM

## 2023-10-16 DIAGNOSIS — M25.511 RIGHT SHOULDER PAIN, UNSPECIFIED CHRONICITY: ICD-10-CM

## 2023-10-16 PROCEDURE — 1126F AMNT PAIN NOTED NONE PRSNT: CPT | Performed by: ORTHOPAEDIC SURGERY

## 2023-10-16 PROCEDURE — 99213 OFFICE O/P EST LOW 20 MIN: CPT | Mod: 25 | Performed by: ORTHOPAEDIC SURGERY

## 2023-10-16 PROCEDURE — 1159F MED LIST DOCD IN RCRD: CPT | Performed by: ORTHOPAEDIC SURGERY

## 2023-10-16 PROCEDURE — 1036F TOBACCO NON-USER: CPT | Performed by: ORTHOPAEDIC SURGERY

## 2023-10-16 PROCEDURE — 99213 OFFICE O/P EST LOW 20 MIN: CPT | Performed by: ORTHOPAEDIC SURGERY

## 2023-10-16 PROCEDURE — 3044F HG A1C LEVEL LT 7.0%: CPT | Performed by: ORTHOPAEDIC SURGERY

## 2023-10-16 PROCEDURE — 2500000004 HC RX 250 GENERAL PHARMACY W/ HCPCS (ALT 636 FOR OP/ED): Performed by: ORTHOPAEDIC SURGERY

## 2023-10-16 PROCEDURE — 73030 X-RAY EXAM OF SHOULDER: CPT | Mod: RIGHT SIDE | Performed by: ORTHOPAEDIC SURGERY

## 2023-10-16 PROCEDURE — 2500000005 HC RX 250 GENERAL PHARMACY W/O HCPCS: Performed by: ORTHOPAEDIC SURGERY

## 2023-10-16 PROCEDURE — 73030 X-RAY EXAM OF SHOULDER: CPT | Mod: RT,FY

## 2023-10-16 PROCEDURE — 20610 DRAIN/INJ JOINT/BURSA W/O US: CPT | Mod: RT | Performed by: ORTHOPAEDIC SURGERY

## 2023-10-16 PROCEDURE — 1160F RVW MEDS BY RX/DR IN RCRD: CPT | Performed by: ORTHOPAEDIC SURGERY

## 2023-10-16 RX ORDER — TRIAMCINOLONE ACETONIDE 40 MG/ML
40 INJECTION, SUSPENSION INTRA-ARTICULAR; INTRAMUSCULAR
Status: COMPLETED | OUTPATIENT
Start: 2023-10-16 | End: 2023-10-16

## 2023-10-16 RX ORDER — IBUPROFEN 800 MG/1
800 TABLET ORAL 3 TIMES DAILY
Qty: 90 TABLET | Refills: 0 | Status: SHIPPED | OUTPATIENT
Start: 2023-10-16 | End: 2023-11-07 | Stop reason: ALTCHOICE

## 2023-10-16 RX ORDER — LIDOCAINE HYDROCHLORIDE 10 MG/ML
8 INJECTION INFILTRATION; PERINEURAL
Status: COMPLETED | OUTPATIENT
Start: 2023-10-16 | End: 2023-10-16

## 2023-10-16 RX ADMIN — LIDOCAINE HYDROCHLORIDE 8 ML: 10 INJECTION, SOLUTION INFILTRATION; PERINEURAL at 11:13

## 2023-10-16 RX ADMIN — TRIAMCINOLONE ACETONIDE 40 MG: 40 INJECTION, SUSPENSION INTRA-ARTICULAR; INTRAMUSCULAR at 11:13

## 2023-10-16 NOTE — PROGRESS NOTES
History of Present Illness   Chief Complaint   Patient presents with    Right Shoulder - Pain     New Patient Visit   Xrays today       History of Present Illness   Patient presents today endorsing shoulder pain.  The pain is worse with overhead activity and tends to wake the patient at night. The pain is sharp in nature, localizes lateral and deep.  Better with rest.  Patient has notable history of a rotator cuff repair about 15 years ago in the right shoulder.  She says shoulder head really has gotten sore and uncomfortable over the last 4 months.  She having activity related complaints.  Even daily activities that bother her.  She feels subjectively weak on occasion.  She states unfortunately feels fairly similar to prior to her repair  []  Imaging  Shoulder: No acute fractures dislocations.  Minor arthritic change.     Assessment:   Right shoulder cuff tendinitis, possible rotator cuff tear    Plan:  We reviewed the role of imaging, physical therapy, injections and the time frame to healing and correlation with outcome.  1.  Reassurance  2.  NSAID: Ibuprofen 800 mg prescription sent to the pharmacy.  GI side effects and medical risks discussed  3.  Ice: 30 minutes on and off  4.  Exercise home program: Medically directed Shoulder therapy / Handout given  5.  Injection options discussed  L Inj/Asp: R glenohumeral on 10/16/2023 11:13 AM  Indications: pain  Details: 22 G needle, posterior approach  Medications: 8 mL lidocaine 10 mg/mL (1 %); 40 mg triamcinolone acetonide 40 mg/mL  Outcome: tolerated well, no immediate complications  Procedure, treatment alternatives, risks and benefits explained, specific risks discussed. Consent was given by the patient. Immediately prior to procedure a time out was called to verify the correct patient, procedure, equipment, support staff and site/side marked as required. Patient was prepped and draped in the usual sterile fashion.             Physical Exam  Well-nourished,  well-developed. No acute distress. Alert and oriented x3. Responds appropriately to questioning. Good mood. Normal affect.  Physical Exam  Right shoulder:  Skin healthy to gross inspection. No ecchymosis, no swelling, no gross atrophy  No tenderness to palpation over acromioclavicular joint  Mild pain in the biceps  ROM: 130 forward flexion  Strength: 5/5 Resisted elevation, external rotation   Pain with lift off and Speeds test + impingement  Negative Spurling´s test  Positive Neer and Hawkin´s test  Neurovascular exam normal distally     Review of Systems   GENERAL: Negative for malaise, significant weight loss, fever  MUSCULOSKELETAL: See HPI  NEURO:  Negative     Past Medical History:   Diagnosis Date    Personal history of other diseases of the circulatory system 2021    History of hypertension    Personal history of other diseases of the digestive system 2017    History of gastrointestinal hemorrhage    Personal history of other diseases of the digestive system 2017    History of small bowel obstruction    Personal history of other specified conditions 2021    History of chest pain    Unspecified atrial fibrillation (CMS/HCC) 2019    Atrial fibrillation with RVR       Medication Documentation Review Audit       Reviewed by Aydee Alva MA (Medical Assistant) on 10/16/23 at 1044      Medication Order Taking? Sig Documenting Provider Last Dose Status   albuterol 90 mcg/actuation inhaler [id number] No Inhale 2 puffs into the lungs every 6 hours as needed for Wheezing Historical Provider, MD Taking Active   aspirin 81 mg chewable tablet [id number] No CHEW AND SWALLOW 1 TABLET DAILY. Historical Provider, MD Taking Active   nitrofurantoin, macrocrystal-monohydrate, (Macrobid) 100 mg capsule [id number]  Take 1 capsule (100 mg) by mouth every 12 hours for 7 days. Hany Leo MD   10/13/23 5910   omeprazole (PriLOSEC) 40 mg DR capsule [id number]  Take 1 capsule (40 mg) by mouth  once daily. Do not crush or chew. Hany Leo MD  Active   OneTouch Ultra Test strip [id number] No use 1 strip to check glucose once daily Historical Provider, MD Taking Active   semaglutide (Ozempic) 0.25 mg or 0.5 mg(2 mg/1.5 mL) pen injector [id number]  Inject 0.25 mg subcutaneous weekly x 4 week then increase to 0.5 mg weekly Hany Leo MD  Active   tiZANidine (Zanaflex) 4 mg tablet [id number] No TAKE 1 TABLET BY MOUTH NIGHTLY AS NEEDED FOR MUSCLE SPASM Historical Provider, MD Not Taking Active                    Allergies   Allergen Reactions    Adhesive Tape-Silicones Unknown    Atorvastatin Other     myalgia       Social History     Socioeconomic History    Marital status: Single     Spouse name: Not on file    Number of children: Not on file    Years of education: Not on file    Highest education level: Not on file   Occupational History    Not on file   Tobacco Use    Smoking status: Never     Passive exposure: Never    Smokeless tobacco: Never   Vaping Use    Vaping Use: Never used   Substance and Sexual Activity    Alcohol use: Never    Drug use: Defer    Sexual activity: Defer   Other Topics Concern    Not on file   Social History Narrative    Not on file     Social Determinants of Health     Financial Resource Strain: Not on file   Food Insecurity: Not on file   Transportation Needs: Not on file   Physical Activity: Not on file   Stress: Not on file   Social Connections: Not on file   Intimate Partner Violence: Not on file   Housing Stability: Not on file       Past Surgical History:   Procedure Laterality Date    APPENDECTOMY  03/08/2017    Appendectomy    CT ABDOMEN PELVIS ANGIOGRAM W AND/OR WO IV CONTRAST  9/7/2022    CT ABDOMEN PELVIS ANGIOGRAM W AND/OR WO IV CONTRAST 9/7/2022 [facility]    HYSTERECTOMY  03/08/2017    Hysterectomy    OTHER SURGICAL HISTORY  07/21/2022    Colonoscopy    OTHER SURGICAL HISTORY  01/23/2021    Hip replacement    OTHER SURGICAL HISTORY  12/22/2022     Atrial appendage closure device insertion    OTHER SURGICAL HISTORY  03/15/2021    Renal lithotripsy    OTHER SURGICAL HISTORY  01/27/2022    Colonic polypectomy    OTHER SURGICAL HISTORY  01/27/2022    Urethral dilation    OTHER SURGICAL HISTORY  12/27/2021    Bladder surgery    OTHER SURGICAL HISTORY  12/27/2021    Complete colonoscopy    OTHER SURGICAL HISTORY  12/27/2021    Tubal ligation    OTHER SURGICAL HISTORY  12/27/2021    Tooth extraction    OTHER SURGICAL HISTORY  12/27/2021    Bruin tooth extraction    OTHER SURGICAL HISTORY  12/27/2021    Esophagogastroduodenoscopy    ROTATOR CUFF REPAIR  03/08/2017    Rotator Cuff Repair       No results found.

## 2023-10-18 ENCOUNTER — OFFICE VISIT (OUTPATIENT)
Dept: ORTHOPEDIC SURGERY | Facility: CLINIC | Age: 68
End: 2023-10-18
Payer: COMMERCIAL

## 2023-10-18 ENCOUNTER — ANCILLARY PROCEDURE (OUTPATIENT)
Dept: RADIOLOGY | Facility: CLINIC | Age: 68
End: 2023-10-18
Payer: COMMERCIAL

## 2023-10-18 DIAGNOSIS — M25.561 BILATERAL CHRONIC KNEE PAIN: Primary | ICD-10-CM

## 2023-10-18 DIAGNOSIS — M17.0 PRIMARY OSTEOARTHRITIS OF BOTH KNEES: ICD-10-CM

## 2023-10-18 DIAGNOSIS — M25.561 BILATERAL CHRONIC KNEE PAIN: ICD-10-CM

## 2023-10-18 DIAGNOSIS — G89.29 BILATERAL CHRONIC KNEE PAIN: Primary | ICD-10-CM

## 2023-10-18 DIAGNOSIS — G89.29 BILATERAL CHRONIC KNEE PAIN: ICD-10-CM

## 2023-10-18 DIAGNOSIS — M22.2X1 PATELLOFEMORAL SYNDROME OF RIGHT KNEE: ICD-10-CM

## 2023-10-18 DIAGNOSIS — M25.562 BILATERAL CHRONIC KNEE PAIN: Primary | ICD-10-CM

## 2023-10-18 DIAGNOSIS — M25.562 BILATERAL CHRONIC KNEE PAIN: ICD-10-CM

## 2023-10-18 PROCEDURE — 1159F MED LIST DOCD IN RCRD: CPT | Performed by: PHYSICIAN ASSISTANT

## 2023-10-18 PROCEDURE — 99213 OFFICE O/P EST LOW 20 MIN: CPT | Performed by: PHYSICIAN ASSISTANT

## 2023-10-18 PROCEDURE — 73564 X-RAY EXAM KNEE 4 OR MORE: CPT | Mod: BILATERAL PROCEDURE | Performed by: ORTHOPAEDIC SURGERY

## 2023-10-18 PROCEDURE — 3044F HG A1C LEVEL LT 7.0%: CPT | Performed by: PHYSICIAN ASSISTANT

## 2023-10-18 PROCEDURE — 1126F AMNT PAIN NOTED NONE PRSNT: CPT | Performed by: PHYSICIAN ASSISTANT

## 2023-10-18 PROCEDURE — 73564 X-RAY EXAM KNEE 4 OR MORE: CPT | Mod: 50,FY

## 2023-10-18 PROCEDURE — 1160F RVW MEDS BY RX/DR IN RCRD: CPT | Performed by: PHYSICIAN ASSISTANT

## 2023-10-18 PROCEDURE — 1036F TOBACCO NON-USER: CPT | Performed by: PHYSICIAN ASSISTANT

## 2023-10-19 RX ORDER — MELOXICAM 15 MG/1
15 TABLET ORAL
Qty: 30 TABLET | Refills: 0 | Status: SHIPPED | OUTPATIENT
Start: 2023-10-19 | End: 2024-05-07 | Stop reason: ALTCHOICE

## 2023-10-26 ENCOUNTER — OFFICE VISIT (OUTPATIENT)
Dept: CARDIOLOGY | Facility: CLINIC | Age: 68
End: 2023-10-26
Payer: COMMERCIAL

## 2023-10-26 VITALS
WEIGHT: 213.6 LBS | SYSTOLIC BLOOD PRESSURE: 146 MMHG | BODY MASS INDEX: 32.37 KG/M2 | HEIGHT: 68 IN | DIASTOLIC BLOOD PRESSURE: 68 MMHG | HEART RATE: 64 BPM

## 2023-10-26 DIAGNOSIS — I10 ESSENTIAL HYPERTENSION: ICD-10-CM

## 2023-10-26 DIAGNOSIS — Z78.9 NEVER SMOKED ANY SUBSTANCE: ICD-10-CM

## 2023-10-26 DIAGNOSIS — I48.0 PAROXYSMAL ATRIAL FIBRILLATION WITH RVR (MULTI): ICD-10-CM

## 2023-10-26 DIAGNOSIS — E78.2 MIXED HYPERLIPIDEMIA: ICD-10-CM

## 2023-10-26 PROCEDURE — 3077F SYST BP >= 140 MM HG: CPT | Performed by: INTERNAL MEDICINE

## 2023-10-26 PROCEDURE — 3008F BODY MASS INDEX DOCD: CPT | Performed by: INTERNAL MEDICINE

## 2023-10-26 PROCEDURE — 1160F RVW MEDS BY RX/DR IN RCRD: CPT | Performed by: INTERNAL MEDICINE

## 2023-10-26 PROCEDURE — 3078F DIAST BP <80 MM HG: CPT | Performed by: INTERNAL MEDICINE

## 2023-10-26 PROCEDURE — 3044F HG A1C LEVEL LT 7.0%: CPT | Performed by: INTERNAL MEDICINE

## 2023-10-26 PROCEDURE — 1159F MED LIST DOCD IN RCRD: CPT | Performed by: INTERNAL MEDICINE

## 2023-10-26 PROCEDURE — 1126F AMNT PAIN NOTED NONE PRSNT: CPT | Performed by: INTERNAL MEDICINE

## 2023-10-26 PROCEDURE — 1036F TOBACCO NON-USER: CPT | Performed by: INTERNAL MEDICINE

## 2023-10-26 PROCEDURE — 99214 OFFICE O/P EST MOD 30 MIN: CPT | Performed by: INTERNAL MEDICINE

## 2023-10-26 NOTE — PROGRESS NOTES
Patient:  Bella Leone  YOB: 1955  MRN: [id number]       Chief Complaint/Active Symptoms:     6 month follow up  Bella Leone is a 68 y.o. female who returns today for cardiac follow-up.  Patient has a history of paroxysmal atrial arrhythmias and atrial fibrillation.  She also has history of atypical chest pain.  A nuclear scan done in a year less than a year ago was normal.  Her echo shows normal LV function at the same time frame.  She said no new unusual symptoms or problems.  She is on her usual medications but was placed on Ozempic and did not tolerate.      Objective:     Vitals:    10/26/23 1133   BP: 146/68   Pulse: 64       Vitals:    10/26/23 1133   Weight: 96.9 kg (213 lb 9.6 oz)       Allergies:     Allergies   Allergen Reactions    Adhesive Tape-Silicones Unknown    Atorvastatin Other     myalgia          Medications:     Current Outpatient Medications   Medication Instructions    albuterol 90 mcg/actuation inhaler Inhale 2 puffs into the lungs every 6 hours as needed for Wheezing    aspirin 81 mg chewable tablet CHEW AND SWALLOW 1 TABLET DAILY.    ibuprofen 800 mg, oral, 3 times daily    meloxicam (MOBIC) 15 mg, oral, Daily with breakfast    omeprazole (PRILOSEC) 40 mg, oral, Daily, Do not crush or chew.    OneTouch Ultra Test strip use 1 strip to check glucose once daily    semaglutide (Ozempic) 0.25 mg or 0.5 mg(2 mg/1.5 mL) pen injector Inject 0.25 mg subcutaneous weekly x 4 week then increase to 0.5 mg weekly       Physical Examination:   Constitutional:       Appearance: Healthy appearance. Not in distress.   HENT:    Mouth/Throat:      Dentition: Normal dentition.      Comments: Multiple teeth missing  Neck:      Vascular: No JVR. JVD normal.   Pulmonary:      Effort: Pulmonary effort is normal.      Breath sounds: Normal breath sounds. No wheezing. No rhonchi. No rales.   Chest:      Chest wall: Not tender to palpatation.   Cardiovascular:      PMI at left midclavicular  line. Normal rate. Regular rhythm. Normal S1. Normal S2.       Murmurs: There is a grade 2/6 blowing holosystolic murmur at the URSB and ULSB.      No gallop.  No click. No rub.   Pulses:     Intact distal pulses.   Edema:     Peripheral edema absent.   Abdominal:      General: Bowel sounds are normal.      Palpations: Abdomen is soft.      Tenderness: There is no abdominal tenderness.   Musculoskeletal: Normal range of motion.         General: No tenderness. Skin:     General: Skin is warm and dry.   Neurological:      General: No focal deficit present.      Mental Status: Alert and oriented to person, place and time.            Lab:     CBC:   Lab Results   Component Value Date    WBC 5.9 04/28/2023    RBC 4.76 04/28/2023    HGB 12.3 04/28/2023    HCT 41.1 04/28/2023     04/28/2023        CMP:    Lab Results   Component Value Date     07/25/2023    K 4.1 07/25/2023     07/25/2023    CO2 27 07/25/2023    BUN 14 07/25/2023    CREATININE 0.81 07/25/2023    GLUCOSE 117 (H) 07/25/2023    CALCIUM 9.3 07/25/2023       Magnesium:    Lab Results   Component Value Date    MG 2.00 09/10/2022       Lipid Profile:    Lab Results   Component Value Date    TRIG 149 07/25/2023    HDL 42.6 07/25/2023       TSH:    Lab Results   Component Value Date    TSH 1.38 08/04/2021       BNP:   Lab Results   Component Value Date    BNP 31 09/25/2022        PT/INR:    Lab Results   Component Value Date    PROTIME 13.3 09/25/2022    INR 1.1 09/25/2022       HgBA1c:    Lab Results   Component Value Date    HGBA1C 6.8 (A) 07/25/2023       BMP:  Lab Results   Component Value Date     07/25/2023     04/28/2023     03/15/2023    K 4.1 07/25/2023    K 4.0 04/28/2023    K 3.9 03/15/2023     07/25/2023     04/28/2023     03/15/2023    CO2 27 07/25/2023    CO2 28 04/28/2023    CO2 26 03/15/2023    BUN 14 07/25/2023    BUN 13 04/28/2023    BUN 11 03/15/2023    CREATININE 0.81 07/25/2023     CREATININE 0.78 04/28/2023    CREATININE 0.72 03/15/2023       CBC:  Lab Results   Component Value Date    WBC 5.9 04/28/2023    WBC 6.2 01/19/2023    WBC 4.8 11/26/2022    RBC 4.76 04/28/2023    RBC 4.50 01/19/2023    RBC 4.13 11/26/2022    HGB 12.3 04/28/2023    HGB 12.2 01/19/2023    HGB 12.0 11/26/2022    HCT 41.1 04/28/2023    HCT 39.5 01/19/2023    HCT 38.4 11/26/2022    MCV 86 04/28/2023    MCV 88 01/19/2023    MCV 93 11/26/2022    MCHC 29.9 (L) 04/28/2023    MCHC 30.9 (L) 01/19/2023    MCHC 31.3 (L) 11/26/2022    RDW 14.9 (H) 04/28/2023    RDW 14.1 01/19/2023    RDW 13.9 11/26/2022     04/28/2023     01/19/2023     11/26/2022       Cardiac Enzymes:    Lab Results   Component Value Date    TROPHS 4 09/25/2022    TROPHS 5 09/25/2022    TROPHS 5 09/25/2022       Hepatic Function Panel:    Lab Results   Component Value Date    ALKPHOS 89 07/25/2023    ALT 18 07/25/2023    AST 19 07/25/2023    PROT 6.9 07/25/2023    BILITOT 0.4 07/25/2023         Diagnostic Studies:     Electrocardiogram 12 Lead    Result Date: 2/28/2023  Normal sinus rhythm Normal ECG No previous ECGs available Confirmed by Brandon Mckeon ([id number]) on 12/7/2022 11:47:08 AM    Electrocardiogram 12 Lead    Result Date: 2/28/2023  Normal sinus rhythm Normal ECG When compared with ECG of 29-NOV-2022 08:54, No significant change was found Confirmed by Gonzalo Carvajal ([id number]) on 12/12/2022 12:54:56 PM    CT watchman full contrast    Result Date: 11/29/2022  MRN: [id number] Patient Name: YAHIR LOYA  STUDY:  CT WATCHMAN FULL CONTRAST;  11/29/2022 8:39 am  INDICATION: pre procedure LAAO sizing and thrombus detection  I48.0: Paroxysmal atrial fibrillation.  COMPARISON: None.  ACCESSION NUMBER(S): [id number]  ORDERING CLINICIAN: VIRGINIA KLEIN  TECHNIQUE: Using multi detector CT technology,axial imaging with prospective gating was performed of the chest following the intravenous administration of contrast material.  A low-osmolar contrast  agent was used ( 70 milliliterOPTIRAY 350). Next, the imaging was repeated with prospective gating after 10 sec delay as per watchman protocol. Also, patient received 500 mL of normal saline prior to exam as per protocol.  For optimization of anatomic evaluation, multiplanar reconstruction, maximum intensity projections, and advanced 3-D off-line postprocessing were performed on a dedicated stand-alone workstation under the direct supervision of the interpreting physician.  CT Dose-Length Product (DLP):  615 mGy*cm CT Dose Reduction Employed: Yes (iterative reconstruction)  FINDINGS: POTENTIAL STUDY LIMITATIONS:  None  CORONARY ARTERIES:  CORONARY ANATOMY: There is normal origin of the coronary arteries.. No discernible coronary artery atherosclerosis or stenotic disease seen. Please note, the study is not optimized for evaluation of coronary arteries.  CARDIAC CHAMBERS: The cardiac chambers demonstrate normal atrioventricular and ventriculoarterial concordance, and systemic and pulmonary venous return.  LEFT ATRIUM: Mildly dilated (28-cm2). There is no evidence of left atrium or left atrial appendage thrombus. The left atrial appendage orifice is oval in shape, measuring 3.0 x 2.4 cm in orthogonal dimensions and with an area of  5.7 cm. sq. Perimeter 8.7 cm. Left atrial appendage depth is 3.5 cm.  RIGHT ATRIUM: Normal size (21-cm2)  VENTRICLES: The left and right ventricles appear normal in appearance, although accurate size measurements are not possible on systolic phase imaging.  INTERATRIAL SEPTUM: Intact.  INTERVENTRICULAR SEPTUM: Intact.  AORTIC VALVE: The aortic valve is trileaflet in morphology. No valvular thickening or calcifications.  MITRAL VALVE: No valvular thickening/calcification.  THORACIC AORTA: The thoracic aorta normal in course and caliber.There is no evidence for acute aortic pathology, such as dissection, intramural hematoma, or contained rupture. The aortic arch is not included on this  examination.  PERICARDIUM: There is no pericardial effusion seen.  CHEST: The trachea and central airways are patent. No endobronchial lesion is seen. There has been interval improvement/near complete resolution of the previously seen multifocal ground-glass opacities. Scattered residual tree-in-bud nodularity in the right middle lobe are likely inflammatory/infectious in etiology  No evidence of lymphadenopathy within included mediastinum. Visualized esophagus appears within normal limits as seen.  UPPER ABDOMEN: Suggestion of diffuse hepatic steatosis.   CHEST WALL AND OSSEOUS STRUCTURES: Visualized chest wall is within normal limits. No acute osseous pathology.There are no suspicious osseous lesions within included chest.  IMPRESSION: 1. No evidence of left atrial/left atrial appendage thrombus. 2. The left atrial appendage orifice is oval in shape, measuring 3.0 x 2.4 cm in orthogonal dimensions and with an area of 5.7 cm. sq. Left atrial appendage depth is 3.5 cm. 3. Normal coronary anatomy without evidence of atherosclerotic changes or stenotic disease. Please note, the study is not optimized for evaluation of coronary arteries. 4. Interval improvement in the previously seen multifocal ground-glass opacities in keeping with improving infectious process. Scattered residual tree-in-bud nodularity predominantly in the right middle lobe, likely due to residual inflammation/infection 5. Suggestion of diffuse hepatic steatosis      Echocardiogram    Result Date: 11/29/2022  [facility], [address] [place] [address]                Tel [phone] and Fax [phone] TRANSTHORACIC ECHOCARDIOGRAM REPORT Patient Name:     YAHIR TAYLOR LOYA  Reading Physician:   [id number] Matt Angulo MD Study Date:       11/29/2022         Referring Physician: DON CHASE MRN/PID:          [id number]           PCP: Accession/Order#: [id number]          Department Location: [facility]                                                            Lab YOB: 1955          Fellow: Gender:           F                  Nurse: Admit Date:       11/29/2022         Sonographer:         Ale Georges RDCS,                                                           REBECCA Admission Status: Inpatient - Time   Additional Staff:                   Critical Height:           172.72 cm          CC Report to:        [facility] Cath Lab [facility] Weight:           95.26 kg           Study Type:          Echocardiogram BSA:              2.09 m2 Blood Pressure: 143 /81 mmHg Diagnosis/ICD: I48.91-Unspecified atrial fibrillation Indication:    Post LAAO Procedure/CPT: Echo Limited-58369 Patient History: Diabetes:          Yes Pertinent History: A-Fib, COPD and Dyspnea. DLD, s/p LAAO 11/29/22. Study Detail: The following Echo studies were performed: 2D. Technically               challenging study due to patient lying in supine position. PHYSICIAN INTERPRETATION: Left Ventricle: The left ventricular systolic function is normal, with an estimated ejection fraction of 65-70%. There are no regional wall motion abnormalities. The left ventricular cavity size is normal. Left ventricular diastolic filling was not assessed. Left Atrium: The left atrium is normal in size. Right Ventricle: The right ventricle was not well visualized. Unable to determine right ventricular systolic function. Right Atrium: The right atrium was not well visualized. Aortic Valve: The aortic valve was not well visualized. The number of aortic cusps could not be determined from this study. There is mild aortic valve cusp calcification. Aortic valve regurgitation was not assessed. Mitral Valve: The mitral valve is normal in structure. There is mild mitral annular calcification. Mitral valve regurgitation was not assessed. Tricuspid Valve: The tricuspid valve was not well visualized. Tricuspid regurgitation  was not assessed. Pulmonic Valve: The pulmonic valve was not assessed. Pulmonic valve regurgitation was not assessed. Pericardium: There is no pericardial effusion noted. Aorta: The aortic root is normal. In comparison to the previous echocardiogram(s): Compared with study from 11/29/2022, no significant change. CONCLUSIONS:  1. Left ventricular systolic function is normal with a 65-70% estimated ejection fraction.  2. There is no pericardial effusion noted. QUANTITATIVE DATA SUMMARY: [id number] Matt Angulo MD Electronically signed on 11/29/2022 at 5:28:41 PM     Echocardiogram    Result Date: 11/29/2022  [facility], [address] [place] [address]                Tel [phone] and Fax [phone] TRANSTHORACIC ECHOCARDIOGRAM REPORT Patient Name:     YAHIR ORTEGARRY  Reading Physician:   [id number] Fransisca Rudd MD Study Date:       11/29/2022         Referring Physician: DON CHASE MRN/PID:          [id number]           PCP: Accession/Order#: [id number]          [facility]                                                           Lab YOB: 1955          Fellow: Gender:           F                  Nurse: Admit Date:       11/29/2022         Sonographer:         Dima Collins HUNTER Admission Status: Inpatient -        Additional Staff:                   Routine Height:           173.00 cm          [facility] Weight:           95.00 kg           Study Type:          Echocardiogram BSA:              2.09 m2 Blood Pressure: 127 /70 mmHg Diagnosis/ICD: Z01.818-Encounter for other preprocedural examination Indication:    Pre Watchman Procedure/CPT: Echo Limited-69942 Patient History: Diabetes:          Yes Pertinent History: A-Fib, COPD, Dyslipidemia and Dyspnea. Study Detail: The following Echo studies were performed: 2D. Technically               challenging study  due to body habitus and patient lying in supine               position. PHYSICIAN INTERPRETATION: Left Ventricle: The left ventricular systolic function is normal. There are no regional wall motion abnormalities. The left ventricular cavity size is decreased. Left ventricular diastolic filling was not assessed. Left Atrium: The left atrium is enlarged. Right Ventricle: The right ventricle was not well visualized. Right ventricular systolic function not assessed. RV not well seen, however grossly normal in size with grossly normal systolic function. Right Atrium: The right atrium was not assessed. Aortic Valve: The aortic valve was not well visualized. There is mild aortic valve cusp calcification. Aortic valve regurgitation was not assessed. Mitral Valve: The mitral valve is normal in structure. Mitral valve regurgitation was not assessed. Tricuspid Valve: The tricuspid valve was not well visualized. Tricuspid regurgitation was not assessed. The right ventricular systolic pressure is unable to be estimated. Pulmonic Valve: The pulmonic valve is not well visualized. Pulmonic valve regurgitation was not assessed. Pericardium: There is no pericardial effusion noted. Aorta: The aortic root is normal. Systemic Veins: The inferior vena cava was not well visualized. In comparison to the previous echocardiogram(s): Compared with the prior exam from 6/29/2022 ([facility])today's exam is only a limited study prior to LAAO closure. Valvular function was not assessed today. There is still normal LV and RV systolic function. CONCLUSIONS:  1. Left ventricular systolic function is normal.  2. Poorly visualized anatomical structures due to suboptimal image quality.  3. RV not well seen, however grossly normal in size with grossly normal systolic function.  4. The left atrium is enlarged.  5. Left ventricular cavity size is decreased.  6. Compared with the prior exam from 6/29/2022 ([facility])today's exam is only a  limited study prior to LAAO closure. Valvular function was not assessed today. There is still normal LV and RV systolic function. QUANTITATIVE DATA SUMMARY: AORTA MEASUREMENTS:                      Normal Ranges: Ao Sinus, d: 2.70 cm (2.1-3.5cm) Ao STJ, d:   3.40 cm (1.7-3.4cm) [id number] Fransisca Rudd MD Electronically signed on 11/29/2022 at 5:30:48 PM     [facility] Procedure    Result Date: 11/29/2022  [facility], Cath Lab, [address] [place] [address] Cardiovascular Catheterization Report Patient Name:     YAHIR VAZQUEZ       Performing Physician:  [id number] Chris Cornejo MD                   VINCENZO Study Date:       11/29/2022       Verifying Physician:   [id number] Chris Cornejo MD MRN/PID:          [id number]         Cardiologist/Co-scrub: Accession/Order#: [id number]        Fellow:                [id number] Unique Vanegas MD YOB: 1955        Fellow: Gender:           F                Referring Physician:   Abdoulaye De Leon DO Admit Date:                        Referring Physician:   x Surgeon:                           Referring Physician:   [id number] Abdoulaye De Leon DO Study: LAAC-Left Atrial Appendage Closure Procedure Description Comments: Sterile prep and appropriate procedure timeout were performed. Using ultrasound guidance and micropuncture technique dual access was obtained in the right common femoral vein. Two 8F sheaths were placed using a modified Seldinger technique. The patient was administered IV Heparin and ACT was confirmed to be therapeutic. An AcuNav ICE probe was then advanced through one of the sheaths and under ICE guidance, transseptal puncture was with a Pomeroy needle, accessing the left atrium. The transseptal tract was then dilated with a Blazable Studioman Fixed Curve access sheath and the ICE probe was advanced through the dilated tract into the left atrium. Next, a 6 Welsh angled pigtail was advanced through  the delivery sheath into the left atrial appendage and angiography was performed via the pigtail. Sizing of the device was confirmed by ICE and angiography. We then advanced a 27mm Watchman Closure Device but it was over-compressed even after multiple retreival and attempts of deployment. Therefore, the 27 mm device was removed and we decided to use 24 mm device. We then advanced a 24mm Watchman Closure Device and confirmed placement both by ICE and angiography. A ï¿½tug testï¿½ was performed and confirmed stability. No color Doppler flow around the device was appreciated. 18% device compression was also confirmed. Having satisfied position, anchoring, size and seal criteria, the device was successfully deployed. All equipment was then removed and hemostasis was facilitated by Vascade closure devices and manual pressure. The patient was enrolled in a research study and data was included in the LAAO registry. CTA at 4 months is required to reassess device positioning and rule out any device leak or device related thrombus. Hemo Complications: No in-lab complications observed. Cardiac Cath Transition of Care Summary: Post Procedure Diagnosis: S/p successful transcatheter left atrial appendage                           closure with 24 mm Watchman FLX. Blood Loss:               Estimated blood loss during the procedure was 10 mls. Specimens Removed:        Number of specimen(s) removed: none. Recommendations: Maximize medical therapy. Telemetry monitoring. Monitor vitals and arterial access site/pulses. Anti-platelet therapy with Dual antiplatelet therapy. ____________________________________________________________________________________ CPT Codes: Perc left atrial appendage closure (LAAC)-87279 ICD 10 Codes: I48.0-Paroxysmal atrial fibrillation [id number] Chris Cornejo MD Performing Physician Electronically signed by [id number] Chris Cornejo MD on 11/29/2022 at 5:17:43 PM cc Report to: [id number] Abdoulaye De Leon DO cc Report to: x cc  "Report to: [id number] Abdoulaye De Leon DO       EKG:   No results found for: \"EKG\"      Radiology:     No orders to display       Assessment/Plan:         Patient Active Problem List   Diagnosis    Acute pain of right shoulder    Acute UTI    Atrial flutter (CMS/HCC)    Atypical chest pain    Bilateral leg edema    BPPV (benign paroxysmal positional vertigo)    Bucket handle tear of lateral meniscus of right knee    Burning with urination    Dysuria    Cellulitis of foot, left    Chronic fatigue    Chronic low back pain    Chronic pain of both hips    Flank pain    Chronic pain of left knee    Colon polyps    Community acquired pneumonia    Contact dermatitis due to poison vine    Contusion of left foot    COPD exacerbation (CMS/HCC)    COPD with chronic bronchitis    Decreased mobility    Essential hypertension    Gastro-esophageal reflux disease without esophagitis    Generalized anxiety disorder    Hematuria, gross    Hyperglycemia    Impaired fasting glucose    Increased urinary frequency    Instability of left knee joint    Jejunitis    Lateral epicondylitis of left elbow    Acute GI bleeding    Lower gastrointestinal bleeding    Lumbar radiculopathy    Lumbar spondylosis with myelopathy    Anemia due to blood loss    Chronic anemia    Chronic insomnia    Macrocytic anemia    Microalbuminuria    Mixed hyperlipidemia    Moderate episode of recurrent major depressive disorder (CMS/HCC)    Muscle strain, shoulder region, right, initial encounter    Neck pain on right side    Numbness and tingling of right upper extremity    Obesity    Paroxysmal atrial fibrillation with RVR (CMS/HCC)    Presence of Watchman left atrial appendage closure device    Primary localized osteoarthrosis of multiple sites    Primary osteoarthritis of both hips    Recurrent nephrolithiasis    Recurrent UTI    Renal colic on left side    Sciatica    Dyspnea on minimal exertion    Shortness of breath    Status post THR (total hip replacement)    " Symptoms of urinary tract infection    Tear of lateral meniscus of left knee, current    Type 2 diabetes mellitus with unspecified complications (CMS/HCC)    Urethral stricture    Vitamin B12 deficiency    Never smoked any substance    Adult BMI 32.0-32.9 kg/sq m         ASSESSMENT   68-year-old female here for routine follow-up.    Meds, vitals, examination as noted.    Chart reviewed in detail including prior echo prior nuclear scan and other procedures and medications and discussed with the patient at length.    Impression:  Paroxysmal atrial fibrillation  Paroxysmal atrial flutter  Status post Watchman device  Obesity  Atypical chest pain with normal nuclear scan  COPD  Type 2 diabetes  Dyslipidemia        PLAN     Recommendation:  Exercise and weight loss  Continue current meds  See me in 6 months  Plan repeat noninvasive studies in 1 year  Call if any issues or problems otherwise develop

## 2023-11-01 ENCOUNTER — APPOINTMENT (OUTPATIENT)
Dept: ORTHOPEDIC SURGERY | Facility: CLINIC | Age: 68
End: 2023-11-01
Payer: COMMERCIAL

## 2023-11-01 ENCOUNTER — LAB (OUTPATIENT)
Dept: LAB | Facility: LAB | Age: 68
End: 2023-11-01
Payer: COMMERCIAL

## 2023-11-01 DIAGNOSIS — E78.2 MIXED HYPERLIPIDEMIA: ICD-10-CM

## 2023-11-01 DIAGNOSIS — D64.9 CHRONIC ANEMIA: ICD-10-CM

## 2023-11-01 DIAGNOSIS — E11.9 TYPE 2 DIABETES MELLITUS WITHOUT COMPLICATION, WITHOUT LONG-TERM CURRENT USE OF INSULIN (MULTI): ICD-10-CM

## 2023-11-01 DIAGNOSIS — I10 ESSENTIAL HYPERTENSION: ICD-10-CM

## 2023-11-01 LAB
ALBUMIN SERPL BCP-MCNC: 3.9 G/DL (ref 3.4–5)
ALP SERPL-CCNC: 76 U/L (ref 33–136)
ALT SERPL W P-5'-P-CCNC: 14 U/L (ref 7–45)
ANION GAP SERPL CALC-SCNC: 10 MMOL/L (ref 10–20)
AST SERPL W P-5'-P-CCNC: 14 U/L (ref 9–39)
BASOPHILS # BLD AUTO: 0.03 X10*3/UL (ref 0–0.1)
BASOPHILS NFR BLD AUTO: 0.6 %
BILIRUB SERPL-MCNC: 0.3 MG/DL (ref 0–1.2)
BUN SERPL-MCNC: 15 MG/DL (ref 6–23)
CALCIUM SERPL-MCNC: 9.1 MG/DL (ref 8.6–10.3)
CHLORIDE SERPL-SCNC: 107 MMOL/L (ref 98–107)
CHOLEST SERPL-MCNC: 137 MG/DL (ref 0–199)
CHOLESTEROL/HDL RATIO: 2.7
CO2 SERPL-SCNC: 29 MMOL/L (ref 21–32)
CREAT SERPL-MCNC: 0.8 MG/DL (ref 0.5–1.05)
EOSINOPHIL # BLD AUTO: 0.07 X10*3/UL (ref 0–0.7)
EOSINOPHIL NFR BLD AUTO: 1.5 %
ERYTHROCYTE [DISTWIDTH] IN BLOOD BY AUTOMATED COUNT: 15.3 % (ref 11.5–14.5)
EST. AVERAGE GLUCOSE BLD GHB EST-MCNC: 143 MG/DL
GFR SERPL CREATININE-BSD FRML MDRD: 81 ML/MIN/1.73M*2
GLUCOSE SERPL-MCNC: 106 MG/DL (ref 74–99)
HBA1C MFR BLD: 6.6 %
HCT VFR BLD AUTO: 35.2 % (ref 36–46)
HDLC SERPL-MCNC: 51.3 MG/DL
HGB BLD-MCNC: 10.4 G/DL (ref 12–16)
IMM GRANULOCYTES # BLD AUTO: 0.03 X10*3/UL (ref 0–0.7)
IMM GRANULOCYTES NFR BLD AUTO: 0.6 % (ref 0–0.9)
LDLC SERPL CALC-MCNC: 65 MG/DL
LYMPHOCYTES # BLD AUTO: 2.37 X10*3/UL (ref 1.2–4.8)
LYMPHOCYTES NFR BLD AUTO: 49.9 %
MCH RBC QN AUTO: 25.6 PG (ref 26–34)
MCHC RBC AUTO-ENTMCNC: 29.5 G/DL (ref 32–36)
MCV RBC AUTO: 87 FL (ref 80–100)
MONOCYTES # BLD AUTO: 0.56 X10*3/UL (ref 0.1–1)
MONOCYTES NFR BLD AUTO: 11.8 %
NEUTROPHILS # BLD AUTO: 1.69 X10*3/UL (ref 1.2–7.7)
NEUTROPHILS NFR BLD AUTO: 35.6 %
NON HDL CHOLESTEROL: 86 MG/DL (ref 0–149)
NRBC BLD-RTO: 0 /100 WBCS (ref 0–0)
PLATELET # BLD AUTO: 332 X10*3/UL (ref 150–450)
POTASSIUM SERPL-SCNC: 4.3 MMOL/L (ref 3.5–5.3)
PROT SERPL-MCNC: 6.3 G/DL (ref 6.4–8.2)
RBC # BLD AUTO: 4.06 X10*6/UL (ref 4–5.2)
SODIUM SERPL-SCNC: 142 MMOL/L (ref 136–145)
TRIGL SERPL-MCNC: 104 MG/DL (ref 0–149)
VLDL: 21 MG/DL (ref 0–40)
WBC # BLD AUTO: 4.8 X10*3/UL (ref 4.4–11.3)

## 2023-11-01 PROCEDURE — 80053 COMPREHEN METABOLIC PANEL: CPT

## 2023-11-01 PROCEDURE — 85025 COMPLETE CBC W/AUTO DIFF WBC: CPT

## 2023-11-01 PROCEDURE — 83036 HEMOGLOBIN GLYCOSYLATED A1C: CPT

## 2023-11-01 PROCEDURE — 80061 LIPID PANEL: CPT

## 2023-11-01 PROCEDURE — 36415 COLL VENOUS BLD VENIPUNCTURE: CPT

## 2023-11-07 ENCOUNTER — OFFICE VISIT (OUTPATIENT)
Dept: PRIMARY CARE | Facility: CLINIC | Age: 68
End: 2023-11-07
Payer: COMMERCIAL

## 2023-11-07 VITALS
OXYGEN SATURATION: 96 % | BODY MASS INDEX: 32.67 KG/M2 | WEIGHT: 215.6 LBS | DIASTOLIC BLOOD PRESSURE: 88 MMHG | HEART RATE: 79 BPM | RESPIRATION RATE: 15 BRPM | TEMPERATURE: 97 F | HEIGHT: 68 IN | SYSTOLIC BLOOD PRESSURE: 130 MMHG

## 2023-11-07 DIAGNOSIS — I10 ESSENTIAL HYPERTENSION: ICD-10-CM

## 2023-11-07 DIAGNOSIS — J44.89 COPD WITH CHRONIC BRONCHITIS (MULTI): ICD-10-CM

## 2023-11-07 DIAGNOSIS — D64.9 CHRONIC ANEMIA: ICD-10-CM

## 2023-11-07 DIAGNOSIS — E78.2 MIXED HYPERLIPIDEMIA: ICD-10-CM

## 2023-11-07 DIAGNOSIS — Z23 NEED FOR INFLUENZA VACCINATION: ICD-10-CM

## 2023-11-07 DIAGNOSIS — G25.81 RESTLESS LEGS SYNDROME (RLS): ICD-10-CM

## 2023-11-07 DIAGNOSIS — E11.9 TYPE 2 DIABETES MELLITUS WITHOUT COMPLICATION, WITHOUT LONG-TERM CURRENT USE OF INSULIN (MULTI): Primary | ICD-10-CM

## 2023-11-07 PROBLEM — J44.1 COPD EXACERBATION (MULTI): Status: RESOLVED | Noted: 2023-04-26 | Resolved: 2023-11-07

## 2023-11-07 PROCEDURE — 3078F DIAST BP <80 MM HG: CPT | Performed by: FAMILY MEDICINE

## 2023-11-07 PROCEDURE — 1126F AMNT PAIN NOTED NONE PRSNT: CPT | Performed by: FAMILY MEDICINE

## 2023-11-07 PROCEDURE — 3008F BODY MASS INDEX DOCD: CPT | Performed by: FAMILY MEDICINE

## 2023-11-07 PROCEDURE — 3074F SYST BP LT 130 MM HG: CPT | Performed by: FAMILY MEDICINE

## 2023-11-07 PROCEDURE — G0008 ADMIN INFLUENZA VIRUS VAC: HCPCS | Performed by: FAMILY MEDICINE

## 2023-11-07 PROCEDURE — 1159F MED LIST DOCD IN RCRD: CPT | Performed by: FAMILY MEDICINE

## 2023-11-07 PROCEDURE — 90662 IIV NO PRSV INCREASED AG IM: CPT | Performed by: FAMILY MEDICINE

## 2023-11-07 PROCEDURE — 3048F LDL-C <100 MG/DL: CPT | Performed by: FAMILY MEDICINE

## 2023-11-07 PROCEDURE — 3044F HG A1C LEVEL LT 7.0%: CPT | Performed by: FAMILY MEDICINE

## 2023-11-07 PROCEDURE — 1036F TOBACCO NON-USER: CPT | Performed by: FAMILY MEDICINE

## 2023-11-07 PROCEDURE — 1160F RVW MEDS BY RX/DR IN RCRD: CPT | Performed by: FAMILY MEDICINE

## 2023-11-07 PROCEDURE — 99214 OFFICE O/P EST MOD 30 MIN: CPT | Performed by: FAMILY MEDICINE

## 2023-11-07 RX ORDER — PRAMIPEXOLE DIHYDROCHLORIDE 0.25 MG/1
0.25 TABLET ORAL NIGHTLY
Qty: 30 TABLET | Refills: 3 | Status: SHIPPED | OUTPATIENT
Start: 2023-11-07 | End: 2024-02-05 | Stop reason: DRUGHIGH

## 2023-11-07 RX ORDER — LINAGLIPTIN 5 MG/1
5 TABLET, FILM COATED ORAL DAILY
Qty: 30 TABLET | Refills: 3 | Status: SHIPPED | OUTPATIENT
Start: 2023-11-07 | End: 2024-02-05 | Stop reason: SDUPTHER

## 2023-11-07 ASSESSMENT — ENCOUNTER SYMPTOMS
DYSURIA: 0
FREQUENCY: 0
CHILLS: 0
POLYPHAGIA: 0
VOMITING: 0
SHORTNESS OF BREATH: 0
VISUAL CHANGE: 0
DIARRHEA: 0
WHEEZING: 0
POLYDIPSIA: 0
DIZZINESS: 0
RHINORRHEA: 0
CONSTIPATION: 0
HEMATURIA: 0
HEADACHES: 0
FEVER: 0
PALPITATIONS: 0
NUMBNESS: 0
COUGH: 0
BLURRED VISION: 0
TREMORS: 0
SORE THROAT: 0
ABDOMINAL PAIN: 0

## 2023-11-07 NOTE — PROGRESS NOTES
Subjective   Patient ID: Bella Leone is a 68 y.o. female who presents for Follow-up (Diabetes, Hypertension, Hyperlipidemia and labs) and Restless Legs.    Patient is here for follow-up on hypertension and hyperlipidemia. She has no dyspnea, near-syncope, orthopnea, palpitations, paroxysmal nocturnal dyspnea, and syncope. Taking her medication regularly with no side effects.    She would like to discuss possible restless leg syndrome. This has been going on for years, and has worsened. She says she wakes up in the middle of the night and her legs are moving. She has to get up at night to walk around. She does not get much sleep at night. Says she gets about 3-4 hours of sleep at night, the quality of sleep is poor.     Diabetes  She presents for her follow-up diabetic visit. She has type 2 diabetes mellitus. Her disease course has been improving. There are no hypoglycemic associated symptoms. Pertinent negatives for hypoglycemia include no dizziness, headaches or tremors. Pertinent negatives for diabetes include no blurred vision, no chest pain, no polydipsia, no polyphagia, no polyuria and no visual change. Risk factors for coronary artery disease include dyslipidemia, hypertension and diabetes mellitus.      Review of Systems   Constitutional:  Negative for chills and fever.   HENT:  Negative for congestion, ear pain, nosebleeds, rhinorrhea and sore throat.    Eyes:  Negative for blurred vision.   Respiratory:  Negative for cough, shortness of breath and wheezing.    Cardiovascular:  Negative for chest pain, palpitations and leg swelling.   Gastrointestinal:  Negative for abdominal pain, constipation, diarrhea and vomiting.   Endocrine: Negative for polydipsia, polyphagia and polyuria.   Genitourinary:  Negative for dysuria, frequency and hematuria.   Neurological:  Negative for dizziness, tremors, numbness and headaches.     Objective   /88   Pulse 79   Temp 36.1 °C (97 °F)   Resp 15   Ht 1.727 m  "(5' 8\")   Wt 97.8 kg (215 lb 9.6 oz)   SpO2 96%   BMI 32.78 kg/m²     Physical Exam  Constitutional:       General: She is not in acute distress.     Appearance: Normal appearance.   HENT:      Head: Normocephalic and atraumatic.      Mouth/Throat:      Mouth: Mucous membranes are moist.      Pharynx: Oropharynx is clear. No oropharyngeal exudate or posterior oropharyngeal erythema.   Eyes:      General: No scleral icterus.     Extraocular Movements: Extraocular movements intact.      Pupils: Pupils are equal, round, and reactive to light.   Cardiovascular:      Rate and Rhythm: Normal rate and regular rhythm.      Pulses: Normal pulses.      Heart sounds: No murmur heard.     No friction rub. No gallop.   Pulmonary:      Effort: Pulmonary effort is normal.      Breath sounds: No wheezing, rhonchi or rales.   Skin:     General: Skin is warm.      Coloration: Skin is not jaundiced or pale.   Neurological:      General: No focal deficit present.      Mental Status: She is alert and oriented to person, place, and time.         Assessment/Plan   Problem List Items Addressed This Visit       COPD with chronic bronchitis     Stable based on symptoms and exam.  Continue established treatment plan and follow-up at least yearly.          Essential hypertension     Well-controlled, continue current medications and management.  Dietary sodium restriction.  Regular aerobic exercise program is recommended to help achieve and maintain normal body weight, fitness and improve lipid balance. .  Dietary changes: Increase soluble fiber  Plant sterols 2grams per day (e.g. Benecol)  Reduce saturated fat, \"trans\" monounsaturated fatty acids, and cholesterol         Relevant Orders    Follow Up In [facility] - PCP - Established    Albumin , Urine Random    CBC and Auto Differential    Comprehensive Metabolic Panel    Hemoglobin A1C    Lipid Panel    Chronic anemia     Stable, continue current medications and management.      "    Mixed hyperlipidemia     The nature of cardiac risk has been fully discussed with this patient. Discussed cardiovascular risk analysis and appropriate diet with the need for lifelong measures to reduce the risk. A regular exercise program is recommended to help achieve and maintain normal body weight, fitness and improve lipid balance. Patient education provided. They understand and agree with this course of treatment. They will return with new or worsening symptoms. Patient instructed to remain current with appropriate annual health maintenance.          Relevant Orders    Follow Up In [facility] - PCP - Established    Albumin , Urine Random    CBC and Auto Differential    Comprehensive Metabolic Panel    Hemoglobin A1C    Lipid Panel    Type 2 diabetes mellitus without complication, without long-term current use of insulin (CMS/[facility]) - Primary     Diabetes Mellitus type II, under good control.  1. Rx changes: We will start her on Tradjenta 5 mg daily.  2. Education: Reviewed ‘ABCs’ of diabetes management (respective goals in parentheses):  A1C (<7), blood pressure (<130/80), and cholesterol (LDL <100).  3. Compliance at present is estimated to be good. Efforts to improve compliance (if necessary) will be directed at dietary modifications: and increased exercise.  4. Follow up: 3 months         Relevant Medications    linaGLIPtin (Tradjenta) 5 mg tablet    Other Relevant Orders    Follow Up In [facility] - PCP - Established    Albumin , Urine Random    CBC and Auto Differential    Comprehensive Metabolic Panel    Hemoglobin A1C    Lipid Panel    Restless legs syndrome (RLS)     We will start her on Mirapex 0.25 mg nightly. She was advised to take 0.5 tablet nightly for 1 week then increase to 1 tablet nightly.  The risks and benefits of my recommendations, as well as other treatment options were discussed with the patient today.            Relevant Medications    pramipexole (Mirapex) 0.25  mg tablet    Other Relevant Orders    Follow Up In [facility] - PCP - Established     Other Visit Diagnoses       Need for influenza vaccination        Relevant Orders    Flu vaccine, quadrivalent, high-dose, preservative free, age 65y+ (FLUZONE) (Completed)          Scribe Attestation  By signing my name below, I, Maria Del Rosario Juan   attest that this documentation has been prepared under the direction and in the presence of Hany Leo MD.

## 2023-11-07 NOTE — ASSESSMENT & PLAN NOTE
Diabetes Mellitus type II, under good control.  1. Rx changes: We will start her on Tradjenta 5 mg daily.  2. Education: Reviewed ‘ABCs’ of diabetes management (respective goals in parentheses):  A1C (<7), blood pressure (<130/80), and cholesterol (LDL <100).  3. Compliance at present is estimated to be good. Efforts to improve compliance (if necessary) will be directed at dietary modifications: and increased exercise.  4. Follow up: 3 months

## 2023-11-07 NOTE — ASSESSMENT & PLAN NOTE
We will start her on Mirapex 0.25 mg nightly. She was advised to take 0.5 tablet nightly for 1 week then increase to 1 tablet nightly.  The risks and benefits of my recommendations, as well as other treatment options were discussed with the patient today.

## 2023-11-08 NOTE — ASSESSMENT & PLAN NOTE
Stable based on symptoms and exam.  Continue established treatment plan and follow-up at least yearly.

## 2023-11-20 ENCOUNTER — APPOINTMENT (OUTPATIENT)
Dept: ORTHOPEDIC SURGERY | Facility: CLINIC | Age: 68
End: 2023-11-20
Payer: COMMERCIAL

## 2023-12-18 ENCOUNTER — APPOINTMENT (OUTPATIENT)
Dept: UROLOGY | Facility: CLINIC | Age: 68
End: 2023-12-18
Payer: COMMERCIAL

## 2023-12-28 DIAGNOSIS — N39.0 ACUTE UTI: ICD-10-CM

## 2023-12-29 DIAGNOSIS — N39.0 URINARY TRACT INFECTION WITHOUT HEMATURIA, SITE UNSPECIFIED: ICD-10-CM

## 2023-12-29 DIAGNOSIS — N39.0 ACUTE UTI: Primary | ICD-10-CM

## 2023-12-29 RX ORDER — NITROFURANTOIN 25; 75 MG/1; MG/1
100 CAPSULE ORAL 2 TIMES DAILY
Qty: 10 CAPSULE | Refills: 0 | Status: SHIPPED | OUTPATIENT
Start: 2023-12-29 | End: 2024-01-03

## 2024-01-03 DIAGNOSIS — E78.2 MIXED HYPERLIPIDEMIA: ICD-10-CM

## 2024-01-03 DIAGNOSIS — E11.9 TYPE 2 DIABETES MELLITUS WITHOUT COMPLICATION, WITHOUT LONG-TERM CURRENT USE OF INSULIN (MULTI): ICD-10-CM

## 2024-01-03 RX ORDER — NITROFURANTOIN 25; 75 MG/1; MG/1
CAPSULE ORAL
Qty: 10 CAPSULE | Refills: 0 | Status: SHIPPED | OUTPATIENT
Start: 2024-01-03 | End: 2024-05-07 | Stop reason: ALTCHOICE

## 2024-01-03 RX ORDER — BLOOD SUGAR DIAGNOSTIC
STRIP MISCELLANEOUS
Qty: 100 EACH | Refills: 1 | Status: SHIPPED | OUTPATIENT
Start: 2024-01-03

## 2024-01-03 RX ORDER — NAPROXEN SODIUM 220 MG/1
81 TABLET, FILM COATED ORAL DAILY
Qty: 90 TABLET | Refills: 2 | Status: SHIPPED | OUTPATIENT
Start: 2024-01-03

## 2024-01-03 NOTE — TELEPHONE ENCOUNTER
Pt is wanting [facility] to take over the Rx for Aspirin if possible ... Pended     Name:  Bella Gamezrry  :  [id number]  Medication Name:  ASPIRIN 81MG AND ONETOUCH ULTRA TEST STRIPS  Specific Pharmacy location:  [facility]  Date of last appointment:  23  Date of next appointment:  24  Best number to reach patient:  [phone]

## 2024-01-22 ENCOUNTER — APPOINTMENT (OUTPATIENT)
Dept: UROLOGY | Facility: CLINIC | Age: 69
End: 2024-01-22
Payer: COMMERCIAL

## 2024-01-31 ENCOUNTER — LAB (OUTPATIENT)
Dept: LAB | Facility: LAB | Age: 69
End: 2024-01-31
Payer: COMMERCIAL

## 2024-01-31 DIAGNOSIS — E78.2 MIXED HYPERLIPIDEMIA: ICD-10-CM

## 2024-01-31 DIAGNOSIS — I10 ESSENTIAL HYPERTENSION: ICD-10-CM

## 2024-01-31 DIAGNOSIS — E11.9 TYPE 2 DIABETES MELLITUS WITHOUT COMPLICATION, WITHOUT LONG-TERM CURRENT USE OF INSULIN (MULTI): ICD-10-CM

## 2024-01-31 LAB
ALBUMIN SERPL BCP-MCNC: 4 G/DL (ref 3.4–5)
ALP SERPL-CCNC: 80 U/L (ref 33–136)
ALT SERPL W P-5'-P-CCNC: 14 U/L (ref 7–45)
ANION GAP SERPL CALC-SCNC: 14 MMOL/L (ref 10–20)
AST SERPL W P-5'-P-CCNC: 17 U/L (ref 9–39)
BASOPHILS # BLD AUTO: 0.03 X10*3/UL (ref 0–0.1)
BASOPHILS NFR BLD AUTO: 0.5 %
BILIRUB SERPL-MCNC: 0.4 MG/DL (ref 0–1.2)
BUN SERPL-MCNC: 13 MG/DL (ref 6–23)
CALCIUM SERPL-MCNC: 8.7 MG/DL (ref 8.6–10.3)
CHLORIDE SERPL-SCNC: 105 MMOL/L (ref 98–107)
CHOLEST SERPL-MCNC: 137 MG/DL (ref 0–199)
CHOLESTEROL/HDL RATIO: 3.5
CO2 SERPL-SCNC: 27 MMOL/L (ref 21–32)
CREAT SERPL-MCNC: 0.71 MG/DL (ref 0.5–1.05)
CREAT UR-MCNC: 160.1 MG/DL (ref 20–320)
EGFRCR SERPLBLD CKD-EPI 2021: >90 ML/MIN/1.73M*2
EOSINOPHIL # BLD AUTO: 0.12 X10*3/UL (ref 0–0.7)
EOSINOPHIL NFR BLD AUTO: 2.1 %
ERYTHROCYTE [DISTWIDTH] IN BLOOD BY AUTOMATED COUNT: 17.5 % (ref 11.5–14.5)
EST. AVERAGE GLUCOSE BLD GHB EST-MCNC: 148 MG/DL
GLUCOSE SERPL-MCNC: 114 MG/DL (ref 74–99)
HBA1C MFR BLD: 6.8 %
HCT VFR BLD AUTO: 32.7 % (ref 36–46)
HDLC SERPL-MCNC: 39.6 MG/DL
HGB BLD-MCNC: 9.4 G/DL (ref 12–16)
IMM GRANULOCYTES # BLD AUTO: 0.01 X10*3/UL (ref 0–0.7)
IMM GRANULOCYTES NFR BLD AUTO: 0.2 % (ref 0–0.9)
LDLC SERPL CALC-MCNC: 58 MG/DL
LYMPHOCYTES # BLD AUTO: 2.59 X10*3/UL (ref 1.2–4.8)
LYMPHOCYTES NFR BLD AUTO: 44.7 %
MCH RBC QN AUTO: 22.6 PG (ref 26–34)
MCHC RBC AUTO-ENTMCNC: 28.7 G/DL (ref 32–36)
MCV RBC AUTO: 79 FL (ref 80–100)
MICROALBUMIN UR-MCNC: 18 MG/L
MICROALBUMIN/CREAT UR: 11.2 UG/MG CREAT
MONOCYTES # BLD AUTO: 0.43 X10*3/UL (ref 0.1–1)
MONOCYTES NFR BLD AUTO: 7.4 %
NEUTROPHILS # BLD AUTO: 2.61 X10*3/UL (ref 1.2–7.7)
NEUTROPHILS NFR BLD AUTO: 45.1 %
NON HDL CHOLESTEROL: 97 MG/DL (ref 0–149)
NRBC BLD-RTO: 0 /100 WBCS (ref 0–0)
PLATELET # BLD AUTO: 360 X10*3/UL (ref 150–450)
POTASSIUM SERPL-SCNC: 3.9 MMOL/L (ref 3.5–5.3)
PROT SERPL-MCNC: 6.5 G/DL (ref 6.4–8.2)
RBC # BLD AUTO: 4.16 X10*6/UL (ref 4–5.2)
SODIUM SERPL-SCNC: 142 MMOL/L (ref 136–145)
TRIGL SERPL-MCNC: 198 MG/DL (ref 0–149)
VLDL: 40 MG/DL (ref 0–40)
WBC # BLD AUTO: 5.8 X10*3/UL (ref 4.4–11.3)

## 2024-01-31 PROCEDURE — 80053 COMPREHEN METABOLIC PANEL: CPT

## 2024-01-31 PROCEDURE — 85025 COMPLETE CBC W/AUTO DIFF WBC: CPT

## 2024-01-31 PROCEDURE — 82043 UR ALBUMIN QUANTITATIVE: CPT

## 2024-01-31 PROCEDURE — 82570 ASSAY OF URINE CREATININE: CPT

## 2024-01-31 PROCEDURE — 83036 HEMOGLOBIN GLYCOSYLATED A1C: CPT

## 2024-01-31 PROCEDURE — 80061 LIPID PANEL: CPT

## 2024-01-31 PROCEDURE — 36415 COLL VENOUS BLD VENIPUNCTURE: CPT

## 2024-02-05 ENCOUNTER — OFFICE VISIT (OUTPATIENT)
Dept: PRIMARY CARE | Facility: CLINIC | Age: 69
End: 2024-02-05
Payer: COMMERCIAL

## 2024-02-05 VITALS
HEIGHT: 69 IN | DIASTOLIC BLOOD PRESSURE: 84 MMHG | BODY MASS INDEX: 32.56 KG/M2 | WEIGHT: 219.8 LBS | SYSTOLIC BLOOD PRESSURE: 124 MMHG | OXYGEN SATURATION: 98 % | HEART RATE: 77 BPM | RESPIRATION RATE: 16 BRPM | TEMPERATURE: 97.7 F

## 2024-02-05 DIAGNOSIS — J02.9 ACUTE PHARYNGITIS, UNSPECIFIED ETIOLOGY: ICD-10-CM

## 2024-02-05 DIAGNOSIS — I10 ESSENTIAL HYPERTENSION: ICD-10-CM

## 2024-02-05 DIAGNOSIS — D64.9 CHRONIC ANEMIA: ICD-10-CM

## 2024-02-05 DIAGNOSIS — I48.0 PAROXYSMAL ATRIAL FIBRILLATION WITH RVR (MULTI): ICD-10-CM

## 2024-02-05 DIAGNOSIS — F33.1 MODERATE EPISODE OF RECURRENT MAJOR DEPRESSIVE DISORDER (MULTI): ICD-10-CM

## 2024-02-05 DIAGNOSIS — Z00.00 ROUTINE GENERAL MEDICAL EXAMINATION AT HEALTH CARE FACILITY: Primary | ICD-10-CM

## 2024-02-05 DIAGNOSIS — E11.9 TYPE 2 DIABETES MELLITUS WITHOUT COMPLICATION, WITHOUT LONG-TERM CURRENT USE OF INSULIN (MULTI): ICD-10-CM

## 2024-02-05 DIAGNOSIS — G25.81 RESTLESS LEGS SYNDROME (RLS): ICD-10-CM

## 2024-02-05 DIAGNOSIS — E78.2 MIXED HYPERLIPIDEMIA: ICD-10-CM

## 2024-02-05 DIAGNOSIS — K21.9 GASTRO-ESOPHAGEAL REFLUX DISEASE WITHOUT ESOPHAGITIS: ICD-10-CM

## 2024-02-05 PROCEDURE — 3079F DIAST BP 80-89 MM HG: CPT | Performed by: FAMILY MEDICINE

## 2024-02-05 PROCEDURE — 1170F FXNL STATUS ASSESSED: CPT | Performed by: FAMILY MEDICINE

## 2024-02-05 PROCEDURE — G0439 PPPS, SUBSEQ VISIT: HCPCS | Performed by: FAMILY MEDICINE

## 2024-02-05 PROCEDURE — 1159F MED LIST DOCD IN RCRD: CPT | Performed by: FAMILY MEDICINE

## 2024-02-05 PROCEDURE — 1158F ADVNC CARE PLAN TLK DOCD: CPT | Performed by: FAMILY MEDICINE

## 2024-02-05 PROCEDURE — 1126F AMNT PAIN NOTED NONE PRSNT: CPT | Performed by: FAMILY MEDICINE

## 2024-02-05 PROCEDURE — 3048F LDL-C <100 MG/DL: CPT | Performed by: FAMILY MEDICINE

## 2024-02-05 PROCEDURE — 3074F SYST BP LT 130 MM HG: CPT | Performed by: FAMILY MEDICINE

## 2024-02-05 PROCEDURE — 3044F HG A1C LEVEL LT 7.0%: CPT | Performed by: FAMILY MEDICINE

## 2024-02-05 PROCEDURE — 3008F BODY MASS INDEX DOCD: CPT | Performed by: FAMILY MEDICINE

## 2024-02-05 PROCEDURE — 1036F TOBACCO NON-USER: CPT | Performed by: FAMILY MEDICINE

## 2024-02-05 PROCEDURE — 99214 OFFICE O/P EST MOD 30 MIN: CPT | Performed by: FAMILY MEDICINE

## 2024-02-05 PROCEDURE — 1123F ACP DISCUSS/DSCN MKR DOCD: CPT | Performed by: FAMILY MEDICINE

## 2024-02-05 PROCEDURE — 1160F RVW MEDS BY RX/DR IN RCRD: CPT | Performed by: FAMILY MEDICINE

## 2024-02-05 PROCEDURE — 3061F NEG MICROALBUMINURIA REV: CPT | Performed by: FAMILY MEDICINE

## 2024-02-05 RX ORDER — FERROUS SULFATE 325(65) MG
325 TABLET, DELAYED RELEASE (ENTERIC COATED) ORAL
Qty: 30 TABLET | Refills: 3 | Status: SHIPPED | OUTPATIENT
Start: 2024-02-05 | End: 2024-05-07 | Stop reason: SDUPTHER

## 2024-02-05 RX ORDER — LINAGLIPTIN 5 MG/1
5 TABLET, FILM COATED ORAL DAILY
Qty: 30 TABLET | Refills: 3 | Status: SHIPPED | OUTPATIENT
Start: 2024-02-05 | End: 2024-05-07 | Stop reason: SDUPTHER

## 2024-02-05 RX ORDER — AMOXICILLIN 875 MG/1
875 TABLET, FILM COATED ORAL 2 TIMES DAILY
Qty: 14 TABLET | Refills: 0 | Status: SHIPPED | OUTPATIENT
Start: 2024-02-05 | End: 2024-02-12

## 2024-02-05 RX ORDER — OMEPRAZOLE 40 MG/1
40 CAPSULE, DELAYED RELEASE ORAL DAILY
Qty: 30 CAPSULE | Refills: 5 | Status: SHIPPED | OUTPATIENT
Start: 2024-02-05 | End: 2024-05-07 | Stop reason: SDUPTHER

## 2024-02-05 RX ORDER — PRAMIPEXOLE DIHYDROCHLORIDE 0.5 MG/1
0.5 TABLET ORAL NIGHTLY
Qty: 30 TABLET | Refills: 3 | Status: SHIPPED | OUTPATIENT
Start: 2024-02-05 | End: 2024-05-07 | Stop reason: SINTOL

## 2024-02-05 ASSESSMENT — ENCOUNTER SYMPTOMS
SINUS PRESSURE: 1
TREMORS: 0
VOMITING: 0
ABDOMINAL PAIN: 0
CONSTIPATION: 0
OCCASIONAL FEELINGS OF UNSTEADINESS: 0
DYSURIA: 0
LOSS OF SENSATION IN FEET: 0
HEMATURIA: 0
SHORTNESS OF BREATH: 0
SORE THROAT: 1
WHEEZING: 0
FEVER: 0
VISUAL CHANGE: 0
POLYDIPSIA: 0
BLURRED VISION: 0
PALPITATIONS: 0
FATIGUE: 0
DIZZINESS: 0
WEAKNESS: 0
RHINORRHEA: 1
HEADACHES: 0
DIARRHEA: 0
CHILLS: 0
POLYPHAGIA: 0
WEIGHT LOSS: 0
FREQUENCY: 0
DEPRESSION: 0
COUGH: 0
NUMBNESS: 0

## 2024-02-05 ASSESSMENT — ACTIVITIES OF DAILY LIVING (ADL)
BATHING: INDEPENDENT
GROCERY_SHOPPING: INDEPENDENT
DRESSING: INDEPENDENT
MANAGING_FINANCES: INDEPENDENT
TAKING_MEDICATION: INDEPENDENT
DOING_HOUSEWORK: INDEPENDENT

## 2024-02-05 NOTE — ASSESSMENT & PLAN NOTE
Diabetes Mellitus type II, under fair control.  1. Rx changes: None  2. Education: Reviewed ‘ABCs’ of diabetes management (respective goals in parentheses):  A1C (<7), blood pressure (<130/80), and cholesterol (LDL <100).  3. Compliance at present is estimated to be fair. Efforts to improve compliance (if necessary) will be directed at dietary modifications: and increased exercise.  4. Follow up: 3 months

## 2024-02-05 NOTE — PROGRESS NOTES
Chief Complaint   Patient presents with    Medicare Annual Wellness Visit Subsequent    Follow-up     Diabetes, Hypertension, Hyperlipidemia and labs    Sore Throat       Subjective   Patient ID: Bella Leone is a 68 y.o. female who presents for Medicare Annual Wellness Visit Subsequent, Follow-up (Diabetes, Hypertension, Hyperlipidemia and labs), and Sore Throat.    She presents for Annual Medicare Wellness Visit and follow-up on hypertension and hyperlipidemia. She has no chest pain, dyspnea, exertional chest pressure/discomfort, near-syncope, orthopnea, palpitations, paroxysmal nocturnal dyspnea, and syncope. Taking her medication regularly with no side effects.    She complains of her restless leg syndrome getting worse and states the Mirapex 0.25 mg is not working as well as it used to.    Diabetes  She presents for her follow-up diabetic visit. She has type 2 diabetes mellitus. Her disease course has been worsening. There are no hypoglycemic associated symptoms. Pertinent negatives for hypoglycemia include no dizziness, headaches or tremors. Pertinent negatives for diabetes include no blurred vision, no chest pain, no fatigue, no foot paresthesias, no foot ulcerations, no polydipsia, no polyphagia, no polyuria, no visual change, no weakness and no weight loss. Risk factors for coronary artery disease include diabetes mellitus, dyslipidemia, hypertension and obesity.   Sore Throat   This is a new problem. Episode onset: a few days ago. The problem has been unchanged. There has been no fever. Associated symptoms include ear pain. Pertinent negatives include no abdominal pain, congestion, coughing, diarrhea, ear discharge, headaches, shortness of breath or vomiting. She has tried nothing for the symptoms.     Past Medical, Surgical, and Family History reviewed and updated in chart.    Reviewed all medications by prescribing practitioner or clinical pharmacist (such as prescriptions, OTCs, herbal therapies  "and supplements) and documented in the medical record.    Patient Self Assessment of Health Status  Patient Self Assessment: Fair    Nutrition and Exercise  Adequate Fluid Intake: Yes  Caffeine: Yes  Exercise Frequency: Infrequently    Functional Ability/Level of Safety  Cognitive Impairment Observed: No cognitive impairment observed  Cognitive Impairment Reported: No cognitive impairment reported by patient or family    Home Safety Risk Factors: None     Review of Systems   Constitutional:  Negative for chills, fatigue, fever and weight loss.   HENT:  Positive for ear pain, rhinorrhea, sinus pressure and sore throat. Negative for congestion, ear discharge and nosebleeds.    Eyes:  Negative for blurred vision.   Respiratory:  Negative for cough, shortness of breath and wheezing.    Cardiovascular:  Negative for chest pain, palpitations and leg swelling.   Gastrointestinal:  Negative for abdominal pain, constipation, diarrhea and vomiting.   Endocrine: Negative for polydipsia, polyphagia and polyuria.   Genitourinary:  Negative for dysuria, frequency and hematuria.   Neurological:  Negative for dizziness, tremors, weakness, numbness and headaches.     Objective   /84   Pulse 77   Temp 36.5 °C (97.7 °F)   Resp 16   Ht 1.753 m (5' 9\")   Wt 99.7 kg (219 lb 12.8 oz)   SpO2 98%   BMI 32.46 kg/m²     Physical Exam  Constitutional:       General: She is not in acute distress.     Appearance: Normal appearance.   HENT:      Head: Normocephalic and atraumatic.      Mouth/Throat:      Mouth: Mucous membranes are moist.      Pharynx: Oropharynx is clear. No oropharyngeal exudate or posterior oropharyngeal erythema.   Eyes:      General: No scleral icterus.     Extraocular Movements: Extraocular movements intact.      Pupils: Pupils are equal, round, and reactive to light.   Cardiovascular:      Rate and Rhythm: Normal rate and regular rhythm.      Pulses: Normal pulses.      Heart sounds: No murmur heard.     No " friction rub. No gallop.   Pulmonary:      Effort: Pulmonary effort is normal.      Breath sounds: No wheezing, rhonchi or rales.   Skin:     General: Skin is warm.      Coloration: Skin is not jaundiced or pale.      Findings: No erythema or rash.   Neurological:      General: No focal deficit present.      Mental Status: She is alert and oriented to person, place, and time.      Cranial Nerves: No cranial nerve deficit.      Sensory: No sensory deficit.      Coordination: Coordination normal.      Gait: Gait normal.         Lab on 01/31/2024   Component Date Value Ref Range Status    WBC 01/31/2024 5.8  4.4 - 11.3 x10*3/uL Final    nRBC 01/31/2024 0.0  0.0 - 0.0 /100 WBCs Final    RBC 01/31/2024 4.16  4.00 - 5.20 x10*6/uL Final    Hemoglobin 01/31/2024 9.4 (L)  12.0 - 16.0 g/dL Final    Hematocrit 01/31/2024 32.7 (L)  36.0 - 46.0 % Final    MCV 01/31/2024 79 (L)  80 - 100 fL Final    MCH 01/31/2024 22.6 (L)  26.0 - 34.0 pg Final    MCHC 01/31/2024 28.7 (L)  32.0 - 36.0 g/dL Final    RDW 01/31/2024 17.5 (H)  11.5 - 14.5 % Final    Platelets 01/31/2024 360  150 - 450 x10*3/uL Final    Neutrophils % 01/31/2024 45.1  40.0 - 80.0 % Final    Immature Granulocytes %, Automated 01/31/2024 0.2  0.0 - 0.9 % Final    Immature Granulocyte Count (IG) includes promyelocytes, myelocytes and metamyelocytes but does not include bands. Percent differential counts (%) should be interpreted in the context of the absolute cell counts (cells/UL).    Lymphocytes % 01/31/2024 44.7  13.0 - 44.0 % Final    Monocytes % 01/31/2024 7.4  2.0 - 10.0 % Final    Eosinophils % 01/31/2024 2.1  0.0 - 6.0 % Final    Basophils % 01/31/2024 0.5  0.0 - 2.0 % Final    Neutrophils Absolute 01/31/2024 2.61  1.20 - 7.70 x10*3/uL Final    Percent differential counts (%) should be interpreted in the context of the absolute cell counts (cells/uL).    Immature Granulocytes Absolute, Au* 01/31/2024 0.01  0.00 - 0.70 x10*3/uL Final    Lymphocytes Absolute  01/31/2024 2.59  1.20 - 4.80 x10*3/uL Final    Monocytes Absolute 01/31/2024 0.43  0.10 - 1.00 x10*3/uL Final    Eosinophils Absolute 01/31/2024 0.12  0.00 - 0.70 x10*3/uL Final    Basophils Absolute 01/31/2024 0.03  0.00 - 0.10 x10*3/uL Final    Glucose 01/31/2024 114 (H)  74 - 99 mg/dL Final    Sodium 01/31/2024 142  136 - 145 mmol/L Final    Potassium 01/31/2024 3.9  3.5 - 5.3 mmol/L Final    Chloride 01/31/2024 105  98 - 107 mmol/L Final    Bicarbonate 01/31/2024 27  21 - 32 mmol/L Final    Anion Gap 01/31/2024 14  10 - 20 mmol/L Final    Urea Nitrogen 01/31/2024 13  6 - 23 mg/dL Final    Creatinine 01/31/2024 0.71  0.50 - 1.05 mg/dL Final    eGFR 01/31/2024 >90  >60 mL/min/1.73m*2 Final    Calculations of estimated GFR are performed using the 2021 CKD-EPI Study Refit equation without the race variable for the IDMS-Traceable creatinine methods.  https://jasn.asnjournals.org/content/early/2021/09/22/ASN.8331109676    Calcium 01/31/2024 8.7  8.6 - 10.3 mg/dL Final    Albumin 01/31/2024 4.0  3.4 - 5.0 g/dL Final    Alkaline Phosphatase 01/31/2024 80  33 - 136 U/L Final    Total Protein 01/31/2024 6.5  6.4 - 8.2 g/dL Final    AST 01/31/2024 17  9 - 39 U/L Final    Bilirubin, Total 01/31/2024 0.4  0.0 - 1.2 mg/dL Final    ALT 01/31/2024 14  7 - 45 U/L Final    Patients treated with Sulfasalazine may generate falsely decreased results for ALT.    Hemoglobin A1C 01/31/2024 6.8 (H)  see below % Final    Estimated Average Glucose 01/31/2024 148  Not Established mg/dL Final    Cholesterol 01/31/2024 137  0 - 199 mg/dL Final          Age      Desirable   Borderline High   High     0-19 Y     0 - 169       170 - 199     >/= 200    20-24 Y     0 - 189       190 - 224     >/= 225         >24 Y     0 - 199       200 - 239     >/= 240   **All ranges are based on fasting samples. Specific   therapeutic targets will vary based on patient-specific   cardiac risk.    Pediatric guidelines reference:Pediatrics 2011, 128(S5).Adult  guidelines reference: NCEP ATPIII Guidelines,FELA 2001, 258:2486-97    Venipuncture immediately after or during the administration of Metamizole may lead to falsely low results. Testing should be performed immediately prior to Metamizole dosing.    HDL-Cholesterol 01/31/2024 39.6  mg/dL Final      Age       Very Low   Low     Normal    High    0-19 Y    < 35      < 40     40-45     ----  20-24 Y    ----     < 40      >45      ----        >24 Y      ----     < 40     40-60      >60      Cholesterol/HDL Ratio 01/31/2024 3.5   Final      Ref Values  Desirable  < 3.4  High Risk  > 5.0    LDL Calculated 01/31/2024 58  <=99 mg/dL Final                                Near   Borderline      AGE      Desirable  Optimal    High     High     Very High     0-19 Y     0 - 109     ---    110-129   >/= 130     ----    20-24 Y     0 - 119     ---    120-159   >/= 160     ----      >24 Y     0 -  99   100-129  130-159   160-189     >/=190      VLDL 01/31/2024 40  0 - 40 mg/dL Final    Triglycerides 01/31/2024 198 (H)  0 - 149 mg/dL Final       Age         Desirable   Borderline High   High     Very High   0 D-90 D    19 - 174         ----         ----        ----  91 D- 9 Y     0 -  74        75 -  99     >/= 100      ----    10-19 Y     0 -  89        90 - 129     >/= 130      ----    20-24 Y     0 - 114       115 - 149     >/= 150      ----         >24 Y     0 - 149       150 - 199    200- 499    >/= 500    Venipuncture immediately after or during the administration of Metamizole may lead to falsely low results. Testing should be performed immediately prior to Metamizole dosing.    Non HDL Cholesterol 01/31/2024 97  0 - 149 mg/dL Final          Age       Desirable   Borderline High   High     Very High     0-19 Y     0 - 119       120 - 144     >/= 145    >/= 160    20-24 Y     0 - 149       150 - 189     >/= 190      ----         >24 Y    30 mg/dL above LDL Cholesterol goal      Albumin, Urine Random 01/31/2024 18.0  Not  established mg/L Final    Creatinine, Urine Random 01/31/2024 160.1  20.0 - 320.0 mg/dL Final    Albumin/Creatine Ratio 01/31/2024 11.2  <30.0 ug/mg Creat Final     Assessment/Plan   Problem List Items Addressed This Visit       Acute pharyngitis     We will prescribe Amoxicillin for her to take if her symptoms don't improve in the next few days.  Recommend liberal oral fluid intake.  Call if symptoms fail to improve as expected.    Follow-up if persistent or worsening symptoms otherwise when necessary.         Relevant Medications    amoxicillin (Amoxil) 875 mg tablet    Chronic anemia     We will start her on Ferrous Sulfate 325 mg daily and recheck her blood count in 3 days         Relevant Medications    ferrous sulfate 325 (65 Fe) MG EC tablet    Essential hypertension     Well-controlled, continue current medications and management.         Relevant Orders    CBC and Auto Differential    Comprehensive Metabolic Panel    Hemoglobin A1C    Lipid Panel    Follow Up In [facility] - PCP - Established    Gastro-esophageal reflux disease without esophagitis     Well-controlled, continue current medications and management.         Relevant Medications    omeprazole (PriLOSEC) 40 mg DR capsule    Mixed hyperlipidemia     The nature of cardiac risk has been fully discussed with this patient. Discussed cardiovascular risk analysis and appropriate diet with the need for lifelong measures to reduce the risk. A regular exercise program is recommended to help achieve and maintain normal body weight, fitness and improve lipid balance. Patient education provided. They understand and agree with this course of treatment. They will return with new or worsening symptoms. Patient instructed to remain current with appropriate annual health maintenance.          Relevant Orders    CBC and Auto Differential    Comprehensive Metabolic Panel    Hemoglobin A1C    Lipid Panel    Follow Up In [facility] - PCP -  Established    Moderate episode of recurrent major depressive disorder (CMS/[facility])     Chronic Condition Documentation : Stable based on symptoms and exam.  Continue established treatment plan and follow-up at least yearly.         Paroxysmal atrial fibrillation with RVR (CMS/[facility])     Chronic Condition Documentation : Stable based on symptoms and exam.  Continue established treatment plan and follow-up at least yearly.         Restless legs syndrome (RLS)     We will increase the Mirapex to 0.5 mg nightly.         Relevant Medications    pramipexole (Mirapex) 0.5 mg tablet    Other Relevant Orders    Follow Up In [facility] - PCP - Established    Routine general medical examination at health care facility - Primary     Recommend low-cholesterol diet, low-fat diet and low-salt diet.  The need for lifelong dietary compliance in order to reduce cardiac risk is recommended.  We will also recommend regular exercise program to improve lipid balance and overall health.  Recommend decreasing fat and cholesterol in diet, increasing aerobic exercise with a goal of 4 or more days per week         Type 2 diabetes mellitus without complication, without long-term current use of insulin (CMS/[facility])     Diabetes Mellitus type II, under fair control.  1. Rx changes: None  2. Education: Reviewed ‘ABCs’ of diabetes management (respective goals in parentheses):  A1C (<7), blood pressure (<130/80), and cholesterol (LDL <100).  3. Compliance at present is estimated to be fair. Efforts to improve compliance (if necessary) will be directed at dietary modifications: and increased exercise.  4. Follow up: 3 months         Relevant Medications    linaGLIPtin (Tradjenta) 5 mg tablet    Other Relevant Orders    CBC and Auto Differential    Comprehensive Metabolic Panel    Hemoglobin A1C    Lipid Panel    Follow Up In [facility] - PCP - Established     Scribe Attestation  By signing my name below, I, Maria Del Rosario Juan    attest that this documentation has been prepared under the direction and in the presence of Hany Leo MD.

## 2024-02-05 NOTE — ASSESSMENT & PLAN NOTE
We will prescribe Amoxicillin for her to take if her symptoms don't improve in the next few days.  Recommend liberal oral fluid intake.  Call if symptoms fail to improve as expected.    Follow-up if persistent or worsening symptoms otherwise when necessary.

## 2024-02-27 ENCOUNTER — APPOINTMENT (OUTPATIENT)
Dept: UROLOGY | Facility: CLINIC | Age: 69
End: 2024-02-27
Payer: COMMERCIAL

## 2024-03-22 ENCOUNTER — LAB (OUTPATIENT)
Dept: LAB | Facility: LAB | Age: 69
End: 2024-03-22
Payer: COMMERCIAL

## 2024-03-22 DIAGNOSIS — N39.0 URINARY TRACT INFECTION WITHOUT HEMATURIA, SITE UNSPECIFIED: ICD-10-CM

## 2024-03-22 DIAGNOSIS — N39.0 ACUTE UTI: ICD-10-CM

## 2024-03-22 PROCEDURE — 87086 URINE CULTURE/COLONY COUNT: CPT

## 2024-03-22 PROCEDURE — 81001 URINALYSIS AUTO W/SCOPE: CPT

## 2024-03-23 LAB
APPEARANCE UR: ABNORMAL
BACTERIA #/AREA URNS AUTO: ABNORMAL /HPF
BACTERIA UR CULT: NORMAL
BILIRUB UR STRIP.AUTO-MCNC: NEGATIVE MG/DL
COLOR UR: YELLOW
GLUCOSE UR STRIP.AUTO-MCNC: NEGATIVE MG/DL
KETONES UR STRIP.AUTO-MCNC: NEGATIVE MG/DL
LEUKOCYTE ESTERASE UR QL STRIP.AUTO: ABNORMAL
MUCOUS THREADS #/AREA URNS AUTO: ABNORMAL /LPF
NITRITE UR QL STRIP.AUTO: NEGATIVE
PH UR STRIP.AUTO: 6 [PH]
PROT UR STRIP.AUTO-MCNC: NEGATIVE MG/DL
RBC # UR STRIP.AUTO: ABNORMAL /UL
RBC #/AREA URNS AUTO: >20 /HPF
SP GR UR STRIP.AUTO: 1.02
SQUAMOUS #/AREA URNS AUTO: ABNORMAL /HPF
UROBILINOGEN UR STRIP.AUTO-MCNC: <2 MG/DL
WBC #/AREA URNS AUTO: >50 /HPF

## 2024-04-05 ENCOUNTER — APPOINTMENT (OUTPATIENT)
Dept: UROLOGY | Facility: CLINIC | Age: 69
End: 2024-04-05
Payer: COMMERCIAL

## 2024-04-25 ENCOUNTER — APPOINTMENT (OUTPATIENT)
Dept: CARDIOLOGY | Facility: CLINIC | Age: 69
End: 2024-04-25
Payer: COMMERCIAL

## 2024-05-03 ENCOUNTER — LAB (OUTPATIENT)
Dept: LAB | Facility: LAB | Age: 69
End: 2024-05-03
Payer: COMMERCIAL

## 2024-05-03 ENCOUNTER — OFFICE VISIT (OUTPATIENT)
Dept: CARDIOLOGY | Facility: CLINIC | Age: 69
End: 2024-05-03
Payer: COMMERCIAL

## 2024-05-03 VITALS
HEART RATE: 72 BPM | HEIGHT: 68 IN | SYSTOLIC BLOOD PRESSURE: 118 MMHG | DIASTOLIC BLOOD PRESSURE: 86 MMHG | BODY MASS INDEX: 32.83 KG/M2 | WEIGHT: 216.6 LBS

## 2024-05-03 DIAGNOSIS — E11.9 TYPE 2 DIABETES MELLITUS WITHOUT COMPLICATION, WITHOUT LONG-TERM CURRENT USE OF INSULIN (MULTI): ICD-10-CM

## 2024-05-03 DIAGNOSIS — R07.9 CHEST PAIN, UNSPECIFIED TYPE: ICD-10-CM

## 2024-05-03 DIAGNOSIS — E78.2 MIXED HYPERLIPIDEMIA: ICD-10-CM

## 2024-05-03 DIAGNOSIS — Z95.818 PRESENCE OF WATCHMAN LEFT ATRIAL APPENDAGE CLOSURE DEVICE: ICD-10-CM

## 2024-05-03 DIAGNOSIS — R06.02 SHORTNESS OF BREATH: ICD-10-CM

## 2024-05-03 DIAGNOSIS — Z78.9 NEVER SMOKED ANY SUBSTANCE: ICD-10-CM

## 2024-05-03 DIAGNOSIS — I48.0 PAROXYSMAL ATRIAL FIBRILLATION WITH RVR (MULTI): ICD-10-CM

## 2024-05-03 DIAGNOSIS — I20.0 ANGINA PECTORIS, UNSTABLE (MULTI): ICD-10-CM

## 2024-05-03 DIAGNOSIS — I10 ESSENTIAL HYPERTENSION: ICD-10-CM

## 2024-05-03 LAB
ALBUMIN SERPL BCP-MCNC: 4 G/DL (ref 3.4–5)
ALP SERPL-CCNC: 81 U/L (ref 33–136)
ALT SERPL W P-5'-P-CCNC: 15 U/L (ref 7–45)
ANION GAP SERPL CALC-SCNC: 10 MMOL/L (ref 10–20)
ANION GAP SERPL CALC-SCNC: 9 MMOL/L (ref 10–20)
AST SERPL W P-5'-P-CCNC: 15 U/L (ref 9–39)
BASOPHILS # BLD AUTO: 0.03 X10*3/UL (ref 0–0.1)
BASOPHILS NFR BLD AUTO: 0.5 %
BILIRUB SERPL-MCNC: 0.4 MG/DL (ref 0–1.2)
BUN SERPL-MCNC: 16 MG/DL (ref 6–23)
BUN SERPL-MCNC: 16 MG/DL (ref 6–23)
CALCIUM SERPL-MCNC: 9.1 MG/DL (ref 8.6–10.3)
CALCIUM SERPL-MCNC: 9.1 MG/DL (ref 8.6–10.3)
CHLORIDE SERPL-SCNC: 107 MMOL/L (ref 98–107)
CHLORIDE SERPL-SCNC: 107 MMOL/L (ref 98–107)
CHOLEST SERPL-MCNC: 146 MG/DL (ref 0–199)
CHOLESTEROL/HDL RATIO: 3.1
CO2 SERPL-SCNC: 27 MMOL/L (ref 21–32)
CO2 SERPL-SCNC: 28 MMOL/L (ref 21–32)
CREAT SERPL-MCNC: 0.83 MG/DL (ref 0.5–1.05)
CREAT SERPL-MCNC: 0.84 MG/DL (ref 0.5–1.05)
EGFRCR SERPLBLD CKD-EPI 2021: 77 ML/MIN/1.73M*2
EGFRCR SERPLBLD CKD-EPI 2021: 78 ML/MIN/1.73M*2
EOSINOPHIL # BLD AUTO: 0.11 X10*3/UL (ref 0–0.7)
EOSINOPHIL NFR BLD AUTO: 1.7 %
ERYTHROCYTE [DISTWIDTH] IN BLOOD BY AUTOMATED COUNT: 19.1 % (ref 11.5–14.5)
ERYTHROCYTE [DISTWIDTH] IN BLOOD BY AUTOMATED COUNT: 19.6 % (ref 11.5–14.5)
EST. AVERAGE GLUCOSE BLD GHB EST-MCNC: 154 MG/DL
GLUCOSE SERPL-MCNC: 120 MG/DL (ref 74–99)
GLUCOSE SERPL-MCNC: 121 MG/DL (ref 74–99)
HBA1C MFR BLD: 7 %
HCT VFR BLD AUTO: 37 % (ref 36–46)
HCT VFR BLD AUTO: 37.5 % (ref 36–46)
HDLC SERPL-MCNC: 46.6 MG/DL
HGB BLD-MCNC: 10.7 G/DL (ref 12–16)
HGB BLD-MCNC: 11.2 G/DL (ref 12–16)
IMM GRANULOCYTES # BLD AUTO: 0.05 X10*3/UL (ref 0–0.7)
IMM GRANULOCYTES NFR BLD AUTO: 0.8 % (ref 0–0.9)
LDLC SERPL CALC-MCNC: 67 MG/DL
LYMPHOCYTES # BLD AUTO: 2.77 X10*3/UL (ref 1.2–4.8)
LYMPHOCYTES NFR BLD AUTO: 42.8 %
MCH RBC QN AUTO: 23.8 PG (ref 26–34)
MCH RBC QN AUTO: 24.5 PG (ref 26–34)
MCHC RBC AUTO-ENTMCNC: 28.9 G/DL (ref 32–36)
MCHC RBC AUTO-ENTMCNC: 29.9 G/DL (ref 32–36)
MCV RBC AUTO: 82 FL (ref 80–100)
MCV RBC AUTO: 82 FL (ref 80–100)
MONOCYTES # BLD AUTO: 0.61 X10*3/UL (ref 0.1–1)
MONOCYTES NFR BLD AUTO: 9.4 %
NEUTROPHILS # BLD AUTO: 2.9 X10*3/UL (ref 1.2–7.7)
NEUTROPHILS NFR BLD AUTO: 44.8 %
NON HDL CHOLESTEROL: 99 MG/DL (ref 0–149)
NRBC BLD-RTO: 0 /100 WBCS (ref 0–0)
NRBC BLD-RTO: 0 /100 WBCS (ref 0–0)
PLATELET # BLD AUTO: 346 X10*3/UL (ref 150–450)
PLATELET # BLD AUTO: 347 X10*3/UL (ref 150–450)
POTASSIUM SERPL-SCNC: 4.4 MMOL/L (ref 3.5–5.3)
POTASSIUM SERPL-SCNC: 4.4 MMOL/L (ref 3.5–5.3)
PROT SERPL-MCNC: 6.8 G/DL (ref 6.4–8.2)
RBC # BLD AUTO: 4.49 X10*6/UL (ref 4–5.2)
RBC # BLD AUTO: 4.58 X10*6/UL (ref 4–5.2)
SODIUM SERPL-SCNC: 139 MMOL/L (ref 136–145)
SODIUM SERPL-SCNC: 141 MMOL/L (ref 136–145)
TRIGL SERPL-MCNC: 160 MG/DL (ref 0–149)
VLDL: 32 MG/DL (ref 0–40)
WBC # BLD AUTO: 6.5 X10*3/UL (ref 4.4–11.3)
WBC # BLD AUTO: 6.7 X10*3/UL (ref 4.4–11.3)

## 2024-05-03 PROCEDURE — 3051F HG A1C>EQUAL 7.0%<8.0%: CPT | Performed by: INTERNAL MEDICINE

## 2024-05-03 PROCEDURE — 3061F NEG MICROALBUMINURIA REV: CPT | Performed by: INTERNAL MEDICINE

## 2024-05-03 PROCEDURE — 85027 COMPLETE CBC AUTOMATED: CPT

## 2024-05-03 PROCEDURE — 3008F BODY MASS INDEX DOCD: CPT | Performed by: INTERNAL MEDICINE

## 2024-05-03 PROCEDURE — 3048F LDL-C <100 MG/DL: CPT | Performed by: INTERNAL MEDICINE

## 2024-05-03 PROCEDURE — 3079F DIAST BP 80-89 MM HG: CPT | Performed by: INTERNAL MEDICINE

## 2024-05-03 PROCEDURE — 85025 COMPLETE CBC W/AUTO DIFF WBC: CPT

## 2024-05-03 PROCEDURE — 1160F RVW MEDS BY RX/DR IN RCRD: CPT | Performed by: INTERNAL MEDICINE

## 2024-05-03 PROCEDURE — 80053 COMPREHEN METABOLIC PANEL: CPT

## 2024-05-03 PROCEDURE — 80061 LIPID PANEL: CPT

## 2024-05-03 PROCEDURE — 99214 OFFICE O/P EST MOD 30 MIN: CPT | Performed by: INTERNAL MEDICINE

## 2024-05-03 PROCEDURE — 1123F ACP DISCUSS/DSCN MKR DOCD: CPT | Performed by: INTERNAL MEDICINE

## 2024-05-03 PROCEDURE — 1159F MED LIST DOCD IN RCRD: CPT | Performed by: INTERNAL MEDICINE

## 2024-05-03 PROCEDURE — 3074F SYST BP LT 130 MM HG: CPT | Performed by: INTERNAL MEDICINE

## 2024-05-03 PROCEDURE — 80048 BASIC METABOLIC PNL TOTAL CA: CPT

## 2024-05-03 PROCEDURE — 83036 HEMOGLOBIN GLYCOSYLATED A1C: CPT

## 2024-05-03 PROCEDURE — 36415 COLL VENOUS BLD VENIPUNCTURE: CPT

## 2024-05-03 ASSESSMENT — ENCOUNTER SYMPTOMS
NEUROLOGICAL NEGATIVE: 1
CARDIOVASCULAR NEGATIVE: 1
RESPIRATORY NEGATIVE: 1
CONSTITUTIONAL NEGATIVE: 1

## 2024-05-03 NOTE — H&P (VIEW-ONLY)
CARDIOLOGY OFFICE VISIT      CHIEF COMPLAINT  Chief Complaint   Patient presents with    Follow-up     7 month follow up, last seen MV 10/2023        HISTORY OF PRESENT ILLNESS  Bella Leone is a 68 y.o. year old female patient with a history of proximal atrial fibrillation, s/p Watchman placement about a year ago.  She also has a history of hypertension and atypical chest pain for which she had a stress test about a couple of years ago that was reportedly normal.  She complains of intermittent chest pressure which is sometimes severe enough, that she feels like repeating her clothes off.  She has also noticed increased shortness of breath with climbing stairs and she is not able to walk her dog because of shortness of breath and chest pain.  She denies orthopnea proximal nocturnal dyspnea.  She does not smoke and she is not exposed to secondhand smoking.  Labs from January 2024 shows cholesterol is 137, HDL is 39, LDL is 58 and triglyceride is 188    ASSESSMENT AND PLAN  1.  Chest pain: Chest pain is concerning for possible unstable angina, with resting chest pain as noted above.  In view of her multiple risk factors for coronary artery disease, I will proceed with cardiac catheterization to rule out any significant obstructive CAD.  In the meantime, we will continue with baby aspirin and other current cardiac medications.  2.  Hypertension: Blood pressure is well-controlled current medications which we will continue.  3.  Dyslipidemia: With acceptable lipid profile as noted above, continue with current treatment strategy.    Problem List Items Addressed This Visit       Chest pain    Essential hypertension    Mixed hyperlipidemia    Paroxysmal atrial fibrillation with RVR (Multi)    Presence of Watchman left atrial appendage closure device    Shortness of breath    Type 2 diabetes mellitus without complication, without long-term current use of insulin (Multi)    Never smoked any substance       Recent  Cardiovascular Testing:    Echo-  Stress-  Cath-  Carotid Ultrasound-    Past Medical History  Past Medical History:   Diagnosis Date    Personal history of other diseases of the circulatory system 06/30/2021    History of hypertension    Personal history of other diseases of the digestive system 03/08/2017    History of gastrointestinal hemorrhage    Personal history of other diseases of the digestive system 03/08/2017    History of small bowel obstruction    Personal history of other specified conditions 06/30/2021    History of chest pain    Unspecified atrial fibrillation (Multi) 11/25/2019    Atrial fibrillation with RVR       Social History  Social History     Tobacco Use    Smoking status: Never     Passive exposure: Never    Smokeless tobacco: Never   Vaping Use    Vaping status: Never Used   Substance Use Topics    Alcohol use: Yes     Comment: 3-4x a year    Drug use: Never       Family History     Family History   Problem Relation Name Age of Onset    Hypertension Mother      Diabetes Mother      Other (cabg) Mother      Other (CARDIAC DISORDER) Mother      Other (CARDIAC PACEMAKER) Mother      Other (CEREBROVASCULAR ACCIDENT) Mother      Heart attack Mother      Kidney disease Father      Hypertension Father      Diabetes Father      Other (CARDIAC DISORDER) Father      Other (CEREBROVASCULAR ACCIDENT) Father          Allergies:  Allergies   Allergen Reactions    Adhesive Tape-Silicones Unknown    Atorvastatin Other     myalgia        Outpatient Medications:  Current Outpatient Medications   Medication Instructions    albuterol 90 mcg/actuation inhaler Inhale 2 puffs into the lungs every 6 hours as needed for Wheezing    aspirin 81 mg, oral, Daily    ferrous sulfate 325 (65 Fe) MG EC tablet 1 tablet, oral, Daily with breakfast, Do not crush, chew, or split.    meloxicam (MOBIC) 15 mg, oral, Daily with breakfast    nitrofurantoin, macrocrystal-monohydrate, (Macrobid) 100 mg capsule TAKE 1 CAPSULE BY MOUTH  EVERY 12 HOURS BEGIN  3  DAYS  BEFORE  YOUR  SCHEDULED  PROCEURE    omeprazole (PRILOSEC) 40 mg, oral, Daily, Do not crush or chew.    OneTouch Ultra Test strip use 1 strip to check glucose once daily    pramipexole (MIRAPEX) 0.5 mg, oral, Nightly    Tradjenta 5 mg, oral, Daily        Recent Lab Results:    CBC:   Lab Results   Component Value Date    WBC 5.8 01/31/2024    RBC 4.16 01/31/2024    HGB 9.4 (L) 01/31/2024    HCT 32.7 (L) 01/31/2024     01/31/2024        CMP:    Lab Results   Component Value Date     01/31/2024    K 3.9 01/31/2024     01/31/2024    CO2 27 01/31/2024    BUN 13 01/31/2024    CREATININE 0.71 01/31/2024    GLUCOSE 114 (H) 01/31/2024    CALCIUM 8.7 01/31/2024       Magnesium:    Lab Results   Component Value Date    MG 2.00 09/10/2022       Lipid Profile:    Lab Results   Component Value Date    TRIG 198 (H) 01/31/2024    HDL 39.6 01/31/2024    LDLCALC 58 01/31/2024       TSH:    Lab Results   Component Value Date    TSH 1.38 08/04/2021       BNP:   Lab Results   Component Value Date    BNP 31 09/25/2022        PT/INR:    Lab Results   Component Value Date    PROTIME 13.3 09/25/2022    INR 1.1 09/25/2022       HgBA1c:    Lab Results   Component Value Date    HGBA1C 6.8 (H) 01/31/2024       BMP:  Lab Results   Component Value Date     01/31/2024     11/01/2023     07/25/2023     04/28/2023     03/15/2023    K 3.9 01/31/2024    K 4.3 11/01/2023    K 4.1 07/25/2023    K 4.0 04/28/2023    K 3.9 03/15/2023     01/31/2024     11/01/2023     07/25/2023     04/28/2023     03/15/2023    CO2 27 01/31/2024    CO2 29 11/01/2023    CO2 27 07/25/2023    CO2 28 04/28/2023    CO2 26 03/15/2023    BUN 13 01/31/2024    BUN 15 11/01/2023    BUN 14 07/25/2023    BUN 13 04/28/2023    BUN 11 03/15/2023    CREATININE 0.71 01/31/2024    CREATININE 0.80 11/01/2023    CREATININE 0.81 07/25/2023    CREATININE 0.78 04/28/2023    CREATININE  0.72 03/15/2023       CBC:  Lab Results   Component Value Date    WBC 5.8 01/31/2024    WBC 4.8 11/01/2023    WBC 5.9 04/28/2023    WBC 6.2 01/19/2023    WBC 4.8 11/26/2022    RBC 4.16 01/31/2024    RBC 4.06 11/01/2023    RBC 4.76 04/28/2023    RBC 4.50 01/19/2023    RBC 4.13 11/26/2022    HGB 9.4 (L) 01/31/2024    HGB 10.4 (L) 11/01/2023    HGB 12.3 04/28/2023    HGB 12.2 01/19/2023    HGB 12.0 11/26/2022    HCT 32.7 (L) 01/31/2024    HCT 35.2 (L) 11/01/2023    HCT 41.1 04/28/2023    HCT 39.5 01/19/2023    HCT 38.4 11/26/2022    MCV 79 (L) 01/31/2024    MCV 87 11/01/2023    MCV 86 04/28/2023    MCV 88 01/19/2023    MCV 93 11/26/2022    MCH 22.6 (L) 01/31/2024    MCH 25.6 (L) 11/01/2023    MCHC 28.7 (L) 01/31/2024    MCHC 29.5 (L) 11/01/2023    MCHC 29.9 (L) 04/28/2023    MCHC 30.9 (L) 01/19/2023    MCHC 31.3 (L) 11/26/2022    RDW 17.5 (H) 01/31/2024    RDW 15.3 (H) 11/01/2023    RDW 14.9 (H) 04/28/2023    RDW 14.1 01/19/2023    RDW 13.9 11/26/2022     01/31/2024     11/01/2023     04/28/2023     01/19/2023     11/26/2022       Cardiac Enzymes:    Lab Results   Component Value Date    TROPHS 4 09/25/2022    TROPHS 5 09/25/2022    TROPHS 5 09/25/2022       Hepatic Function Panel:    Lab Results   Component Value Date    ALKPHOS 80 01/31/2024    ALT 14 01/31/2024    AST 17 01/31/2024    PROT 6.5 01/31/2024    BILITOT 0.4 01/31/2024         REVIEW OF SYSTEMS  Review of Systems   Constitutional: Negative.   Cardiovascular: Negative.    Respiratory: Negative.     Neurological: Negative.    All other systems reviewed and are negative.      VITALS  Vitals:    05/03/24 0817   BP: 118/86   Pulse: 72     Wt Readings from Last 4 Encounters:   05/03/24 98.2 kg (216 lb 9.6 oz)   02/05/24 99.7 kg (219 lb 12.8 oz)   11/07/23 97.8 kg (215 lb 9.6 oz)   10/26/23 96.9 kg (213 lb 9.6 oz)       PHYSICAL EXAM  Constitutional:       Appearance: Healthy appearance. Not in distress.   Eyes:       Conjunctiva/sclera: Conjunctivae normal.      Pupils: Pupils are equal, round, and reactive to light.   Neck:      Vascular: No JVR. JVD normal.   Pulmonary:      Effort: Pulmonary effort is normal.      Breath sounds: Normal breath sounds. No wheezing. No rhonchi. No rales.   Chest:      Chest wall: Not tender to palpatation.   Cardiovascular:      PMI at left midclavicular line. Normal rate. Regular rhythm. Normal S1. Normal S2.       Murmurs:  1-2/6 systolic murmur RSB      No gallop.  No click. No rub.   Pulses:     Intact distal pulses.   Edema:     Peripheral edema absent.   Abdominal:      Tenderness: There is no abdominal tenderness.   Musculoskeletal: Normal range of motion.         General: No tenderness.      Cervical back: Normal range of motion. Skin:     General: Skin is warm and dry.   Neurological:      General: No focal deficit present.      Mental Status: Alert and oriented to person, place and time.

## 2024-05-07 ENCOUNTER — OFFICE VISIT (OUTPATIENT)
Dept: PRIMARY CARE | Facility: CLINIC | Age: 69
End: 2024-05-07
Payer: COMMERCIAL

## 2024-05-07 VITALS
RESPIRATION RATE: 14 BRPM | OXYGEN SATURATION: 98 % | SYSTOLIC BLOOD PRESSURE: 128 MMHG | BODY MASS INDEX: 33.19 KG/M2 | HEIGHT: 68 IN | DIASTOLIC BLOOD PRESSURE: 80 MMHG | WEIGHT: 219 LBS | HEART RATE: 87 BPM | TEMPERATURE: 97.4 F

## 2024-05-07 DIAGNOSIS — E78.2 MIXED HYPERLIPIDEMIA: ICD-10-CM

## 2024-05-07 DIAGNOSIS — K21.9 GASTRO-ESOPHAGEAL REFLUX DISEASE WITHOUT ESOPHAGITIS: ICD-10-CM

## 2024-05-07 DIAGNOSIS — I10 ESSENTIAL HYPERTENSION: ICD-10-CM

## 2024-05-07 DIAGNOSIS — E11.9 TYPE 2 DIABETES MELLITUS WITHOUT COMPLICATION, WITHOUT LONG-TERM CURRENT USE OF INSULIN (MULTI): Primary | ICD-10-CM

## 2024-05-07 DIAGNOSIS — Z12.11 SCREENING FOR COLON CANCER: ICD-10-CM

## 2024-05-07 DIAGNOSIS — J44.89 COPD WITH CHRONIC BRONCHITIS (MULTI): ICD-10-CM

## 2024-05-07 DIAGNOSIS — D64.9 CHRONIC ANEMIA: ICD-10-CM

## 2024-05-07 PROCEDURE — 3078F DIAST BP <80 MM HG: CPT | Performed by: FAMILY MEDICINE

## 2024-05-07 PROCEDURE — 3051F HG A1C>EQUAL 7.0%<8.0%: CPT | Performed by: FAMILY MEDICINE

## 2024-05-07 PROCEDURE — 1159F MED LIST DOCD IN RCRD: CPT | Performed by: FAMILY MEDICINE

## 2024-05-07 PROCEDURE — 99214 OFFICE O/P EST MOD 30 MIN: CPT | Performed by: FAMILY MEDICINE

## 2024-05-07 PROCEDURE — 3008F BODY MASS INDEX DOCD: CPT | Performed by: FAMILY MEDICINE

## 2024-05-07 PROCEDURE — 3074F SYST BP LT 130 MM HG: CPT | Performed by: FAMILY MEDICINE

## 2024-05-07 PROCEDURE — 1160F RVW MEDS BY RX/DR IN RCRD: CPT | Performed by: FAMILY MEDICINE

## 2024-05-07 PROCEDURE — 1123F ACP DISCUSS/DSCN MKR DOCD: CPT | Performed by: FAMILY MEDICINE

## 2024-05-07 PROCEDURE — 1036F TOBACCO NON-USER: CPT | Performed by: FAMILY MEDICINE

## 2024-05-07 PROCEDURE — 3048F LDL-C <100 MG/DL: CPT | Performed by: FAMILY MEDICINE

## 2024-05-07 PROCEDURE — 3061F NEG MICROALBUMINURIA REV: CPT | Performed by: FAMILY MEDICINE

## 2024-05-07 RX ORDER — OMEPRAZOLE 40 MG/1
40 CAPSULE, DELAYED RELEASE ORAL DAILY
Qty: 30 CAPSULE | Refills: 5 | Status: SHIPPED | OUTPATIENT
Start: 2024-05-07

## 2024-05-07 RX ORDER — FERROUS SULFATE 325(65) MG
325 TABLET, DELAYED RELEASE (ENTERIC COATED) ORAL
Qty: 30 TABLET | Refills: 3 | Status: SHIPPED | OUTPATIENT
Start: 2024-05-07

## 2024-05-07 RX ORDER — LINAGLIPTIN 5 MG/1
5 TABLET, FILM COATED ORAL DAILY
Qty: 30 TABLET | Refills: 3 | Status: SHIPPED | OUTPATIENT
Start: 2024-05-07

## 2024-05-07 ASSESSMENT — ENCOUNTER SYMPTOMS
SHORTNESS OF BREATH: 0
NUMBNESS: 0
VOMITING: 0
PALPITATIONS: 0
FEVER: 0
DYSURIA: 0
HEADACHES: 0
VISUAL CHANGE: 0
DIARRHEA: 0
BLURRED VISION: 0
SORE THROAT: 0
RHINORRHEA: 0
WEIGHT LOSS: 0
COUGH: 0
ABDOMINAL PAIN: 0
WHEEZING: 0
FREQUENCY: 0
FATIGUE: 0
CHILLS: 0
CONSTIPATION: 0
HEMATURIA: 0
DIZZINESS: 0
TREMORS: 0
POLYPHAGIA: 0
WEAKNESS: 0
POLYDIPSIA: 0

## 2024-05-07 NOTE — PROGRESS NOTES
"Subjective   Patient ID: Bella Leone is a 68 y.o. female who presents for Follow-up (Diabetes, Hypertension, Hyperlipidemia and labs).    Patient is here for follow-up on hypertension and hyperlipidemia. She has no near-syncope, orthopnea, palpitations, paroxysmal nocturnal dyspnea, and syncope. Taking her medication regularly with no side effects.    Diabetes  She presents for her follow-up diabetic visit. She has type 2 diabetes mellitus. Her disease course has been worsening. There are no hypoglycemic associated symptoms. Pertinent negatives for hypoglycemia include no dizziness, headaches or tremors. Pertinent negatives for diabetes include no blurred vision, no chest pain, no fatigue, no foot paresthesias, no foot ulcerations, no polydipsia, no polyphagia, no polyuria, no visual change, no weakness and no weight loss. Risk factors for coronary artery disease include diabetes mellitus, dyslipidemia, hypertension and obesity.        Review of Systems   Constitutional:  Negative for chills, fatigue, fever and weight loss.   HENT:  Negative for congestion, ear pain, nosebleeds, rhinorrhea and sore throat.    Eyes:  Negative for blurred vision.   Respiratory:  Negative for cough, shortness of breath and wheezing.    Cardiovascular:  Negative for chest pain, palpitations and leg swelling.   Gastrointestinal:  Negative for abdominal pain, constipation, diarrhea and vomiting.   Endocrine: Negative for polydipsia, polyphagia and polyuria.   Genitourinary:  Negative for dysuria, frequency and hematuria.   Neurological:  Negative for dizziness, tremors, weakness, numbness and headaches.       Objective   /80   Pulse 87   Temp 36.3 °C (97.4 °F)   Resp 14   Ht 1.727 m (5' 8\")   Wt 99.3 kg (219 lb)   SpO2 98%   BMI 33.30 kg/m²     Physical Exam  Constitutional:       General: She is not in acute distress.     Appearance: Normal appearance.   HENT:      Head: Normocephalic and atraumatic.      Mouth/Throat: "      Mouth: Mucous membranes are moist.      Pharynx: Oropharynx is clear. No oropharyngeal exudate or posterior oropharyngeal erythema.   Eyes:      General: No scleral icterus.     Extraocular Movements: Extraocular movements intact.      Pupils: Pupils are equal, round, and reactive to light.   Cardiovascular:      Rate and Rhythm: Normal rate and regular rhythm.      Pulses: Normal pulses.      Heart sounds: No murmur heard.     No friction rub. No gallop.   Pulmonary:      Effort: Pulmonary effort is normal.      Breath sounds: No wheezing, rhonchi or rales.   Skin:     General: Skin is warm.      Coloration: Skin is not jaundiced or pale.      Findings: No erythema or rash.   Neurological:      General: No focal deficit present.      Mental Status: She is alert and oriented to person, place, and time.      Cranial Nerves: No cranial nerve deficit.      Sensory: No sensory deficit.      Coordination: Coordination normal.      Gait: Gait normal.         Lab on 05/03/2024   Component Date Value Ref Range Status    Glucose 05/03/2024 120 (H)  74 - 99 mg/dL Final    Sodium 05/03/2024 139  136 - 145 mmol/L Final    Potassium 05/03/2024 4.4  3.5 - 5.3 mmol/L Final    Chloride 05/03/2024 107  98 - 107 mmol/L Final    Bicarbonate 05/03/2024 27  21 - 32 mmol/L Final    Anion Gap 05/03/2024 9 (L)  10 - 20 mmol/L Final    Urea Nitrogen 05/03/2024 16  6 - 23 mg/dL Final    Creatinine 05/03/2024 0.83  0.50 - 1.05 mg/dL Final    eGFR 05/03/2024 78  >60 mL/min/1.73m*2 Final    Calculations of estimated GFR are performed using the 2021 CKD-EPI Study Refit equation without the race variable for the IDMS-Traceable creatinine methods.  https://jasn.asnjournals.org/content/early/2021/09/22/ASN.9249295994    Calcium 05/03/2024 9.1  8.6 - 10.3 mg/dL Final    WBC 05/03/2024 6.7  4.4 - 11.3 x10*3/uL Final    nRBC 05/03/2024 0.0  0.0 - 0.0 /100 WBCs Final    RBC 05/03/2024 4.58  4.00 - 5.20 x10*6/uL Final    Hemoglobin 05/03/2024 11.2  (L)  12.0 - 16.0 g/dL Final    Hematocrit 05/03/2024 37.5  36.0 - 46.0 % Final    MCV 05/03/2024 82  80 - 100 fL Final    MCH 05/03/2024 24.5 (L)  26.0 - 34.0 pg Final    MCHC 05/03/2024 29.9 (L)  32.0 - 36.0 g/dL Final    RDW 05/03/2024 19.1 (H)  11.5 - 14.5 % Final    Platelets 05/03/2024 346  150 - 450 x10*3/uL Final     Assessment/Plan   Problem List Items Addressed This Visit       Chronic anemia     We will have her start back on the Ferrous Sulfate.         Relevant Medications    ferrous sulfate 325 (65 Fe) MG EC tablet    Other Relevant Orders    CBC and Auto Differential    COPD with chronic bronchitis (Multi)     Chronic Condition Documentation : Stable based on symptoms and exam.  Continue established treatment plan and follow-up at least yearly.         Essential hypertension     Well-controlled, continue current medications and management.         Relevant Orders    Follow Up In [facility] - PCP - Established    CBC and Auto Differential    Comprehensive Metabolic Panel    Hemoglobin A1C    Lipid Panel    Gastro-esophageal reflux disease without esophagitis     Well-controlled, continue current medications and management.         Relevant Medications    omeprazole (PriLOSEC) 40 mg DR capsule    Mixed hyperlipidemia     The nature of cardiac risk has been fully discussed with this patient. Discussed cardiovascular risk analysis and appropriate diet with the need for lifelong measures to reduce the risk. A regular exercise program is recommended to help achieve and maintain normal body weight, fitness and improve lipid balance. Patient education provided. They understand and agree with this course of treatment. They will return with new or worsening symptoms. Patient instructed to remain current with appropriate annual health maintenance.          Relevant Orders    Follow Up In [facility] - PCP - Established    CBC and Auto Differential    Comprehensive Metabolic Panel     Hemoglobin A1C    Lipid Panel    Type 2 diabetes mellitus without complication, without long-term current use of insulin (Multi) - Primary     Diabetes Mellitus type II, under good control.  1. Rx changes: None  2. Education: Reviewed ‘ABCs’ of diabetes management (respective goals in parentheses):  A1C (<7), blood pressure (<130/80), and cholesterol (LDL <100).  3. Compliance at present is estimated to be good. Efforts to improve compliance (if necessary) will be directed at dietary modifications: and increased exercise.  4. Follow up: 3 months         Relevant Medications    linaGLIPtin (Tradjenta) 5 mg tablet    Other Relevant Orders    Follow Up In [facility] - PCP - Established    CBC and Auto Differential    Comprehensive Metabolic Panel    Hemoglobin A1C    Lipid Panel     Other Visit Diagnoses       Screening for colon cancer        Relevant Orders    Colonoscopy Screening; Average Risk Patient          Scribe Attestation  By signing my name below, Greg RAMON, Maria Del Rosario   attest that this documentation has been prepared under the direction and in the presence of Hany Leo MD.

## 2024-05-07 NOTE — LETTER
May 7, 2024     Patient: Bella Leone   YOB: 1955   Date of Visit: 5/7/2024       To Whom It May Concern:    It is my medical opinion that Ms. Leone requires a disability parking placard for the following reasons:  She cannot walk 200 feet without stopping to rest.  Duration of need: 5 years Expires: 5/6/2029.    If you have any questions or concerns, please don't hesitate to call.         Sincerely,        Hany Leo MD        CC: No Recipients

## 2024-05-10 ENCOUNTER — APPOINTMENT (OUTPATIENT)
Dept: CARDIOLOGY | Facility: HOSPITAL | Age: 69
End: 2024-05-10
Payer: COMMERCIAL

## 2024-05-10 ENCOUNTER — HOSPITAL ENCOUNTER (OUTPATIENT)
Facility: HOSPITAL | Age: 69
Setting detail: OUTPATIENT SURGERY
Discharge: HOME | End: 2024-05-10
Attending: INTERNAL MEDICINE | Admitting: INTERNAL MEDICINE
Payer: COMMERCIAL

## 2024-05-10 DIAGNOSIS — R06.02 SHORTNESS OF BREATH: Primary | ICD-10-CM

## 2024-05-10 DIAGNOSIS — I20.0 ANGINA PECTORIS, UNSTABLE (MULTI): ICD-10-CM

## 2024-05-10 DIAGNOSIS — I20.9 ANGINA PECTORIS, UNSPECIFIED (CMS-HCC): ICD-10-CM

## 2024-05-10 PROCEDURE — C1894 INTRO/SHEATH, NON-LASER: HCPCS | Performed by: INTERNAL MEDICINE

## 2024-05-10 PROCEDURE — 93010 ELECTROCARDIOGRAM REPORT: CPT | Performed by: INTERNAL MEDICINE

## 2024-05-10 PROCEDURE — 2500000004 HC RX 250 GENERAL PHARMACY W/ HCPCS (ALT 636 FOR OP/ED): Performed by: INTERNAL MEDICINE

## 2024-05-10 PROCEDURE — 2500000005 HC RX 250 GENERAL PHARMACY W/O HCPCS: Performed by: INTERNAL MEDICINE

## 2024-05-10 PROCEDURE — 2500000001 HC RX 250 WO HCPCS SELF ADMINISTERED DRUGS (ALT 637 FOR MEDICARE OP): Performed by: NURSE PRACTITIONER

## 2024-05-10 PROCEDURE — 2550000001 HC RX 255 CONTRASTS: Performed by: INTERNAL MEDICINE

## 2024-05-10 PROCEDURE — 93458 L HRT ARTERY/VENTRICLE ANGIO: CPT | Performed by: INTERNAL MEDICINE

## 2024-05-10 PROCEDURE — 7100000010 HC PHASE TWO TIME - EACH INCREMENTAL 1 MINUTE: Performed by: INTERNAL MEDICINE

## 2024-05-10 PROCEDURE — 99152 MOD SED SAME PHYS/QHP 5/>YRS: CPT | Performed by: INTERNAL MEDICINE

## 2024-05-10 PROCEDURE — 96373 THER/PROPH/DIAG INJ IA: CPT | Performed by: INTERNAL MEDICINE

## 2024-05-10 PROCEDURE — 2720000007 HC OR 272 NO HCPCS: Performed by: INTERNAL MEDICINE

## 2024-05-10 PROCEDURE — 93005 ELECTROCARDIOGRAM TRACING: CPT | Mod: 59

## 2024-05-10 PROCEDURE — C1887 CATHETER, GUIDING: HCPCS | Performed by: INTERNAL MEDICINE

## 2024-05-10 PROCEDURE — 2500000004 HC RX 250 GENERAL PHARMACY W/ HCPCS (ALT 636 FOR OP/ED): Performed by: NURSE PRACTITIONER

## 2024-05-10 PROCEDURE — 7100000009 HC PHASE TWO TIME - INITIAL BASE CHARGE: Performed by: INTERNAL MEDICINE

## 2024-05-10 RX ORDER — FENTANYL CITRATE 50 UG/ML
INJECTION, SOLUTION INTRAMUSCULAR; INTRAVENOUS AS NEEDED
Status: DISCONTINUED | OUTPATIENT
Start: 2024-05-10 | End: 2024-05-10 | Stop reason: HOSPADM

## 2024-05-10 RX ORDER — NITROGLYCERIN 40 MG/100ML
INJECTION INTRAVENOUS AS NEEDED
Status: DISCONTINUED | OUTPATIENT
Start: 2024-05-10 | End: 2024-05-10 | Stop reason: HOSPADM

## 2024-05-10 RX ORDER — SODIUM CHLORIDE 9 MG/ML
100 INJECTION, SOLUTION INTRAVENOUS CONTINUOUS
Status: DISCONTINUED | OUTPATIENT
Start: 2024-05-10 | End: 2024-05-10 | Stop reason: HOSPADM

## 2024-05-10 RX ORDER — ASPIRIN 325 MG
325 TABLET ORAL ONCE
Status: COMPLETED | OUTPATIENT
Start: 2024-05-10 | End: 2024-05-10

## 2024-05-10 RX ORDER — SODIUM CHLORIDE 9 MG/ML
100 INJECTION, SOLUTION INTRAVENOUS CONTINUOUS
Status: DISCONTINUED | OUTPATIENT
Start: 2024-05-10 | End: 2024-05-10

## 2024-05-10 RX ORDER — METOPROLOL TARTRATE 25 MG/1
25 TABLET, FILM COATED ORAL ONCE
Status: COMPLETED | OUTPATIENT
Start: 2024-05-10 | End: 2024-05-10

## 2024-05-10 RX ORDER — RANOLAZINE 500 MG/1
500 TABLET, EXTENDED RELEASE ORAL ONCE
Status: COMPLETED | OUTPATIENT
Start: 2024-05-10 | End: 2024-05-10

## 2024-05-10 RX ORDER — HEPARIN SODIUM 1000 [USP'U]/ML
INJECTION, SOLUTION INTRAVENOUS; SUBCUTANEOUS AS NEEDED
Status: DISCONTINUED | OUTPATIENT
Start: 2024-05-10 | End: 2024-05-10 | Stop reason: HOSPADM

## 2024-05-10 RX ORDER — LIDOCAINE HYDROCHLORIDE 20 MG/ML
INJECTION, SOLUTION INFILTRATION; PERINEURAL AS NEEDED
Status: DISCONTINUED | OUTPATIENT
Start: 2024-05-10 | End: 2024-05-10 | Stop reason: HOSPADM

## 2024-05-10 RX ORDER — MIDAZOLAM HYDROCHLORIDE 1 MG/ML
INJECTION, SOLUTION INTRAMUSCULAR; INTRAVENOUS AS NEEDED
Status: DISCONTINUED | OUTPATIENT
Start: 2024-05-10 | End: 2024-05-10 | Stop reason: HOSPADM

## 2024-05-10 RX ADMIN — METOPROLOL TARTRATE 25 MG: 25 TABLET, FILM COATED ORAL at 06:56

## 2024-05-10 RX ADMIN — SODIUM CHLORIDE 100 ML/HR: 9 INJECTION, SOLUTION INTRAVENOUS at 06:56

## 2024-05-10 RX ADMIN — ASPIRIN 325 MG: 325 TABLET ORAL at 06:56

## 2024-05-10 RX ADMIN — RANOLAZINE 500 MG: 500 TABLET, FILM COATED, EXTENDED RELEASE ORAL at 06:56

## 2024-05-10 ASSESSMENT — PAIN - FUNCTIONAL ASSESSMENT
PAIN_FUNCTIONAL_ASSESSMENT: 0-10

## 2024-05-10 ASSESSMENT — COLUMBIA-SUICIDE SEVERITY RATING SCALE - C-SSRS
1. IN THE PAST MONTH, HAVE YOU WISHED YOU WERE DEAD OR WISHED YOU COULD GO TO SLEEP AND NOT WAKE UP?: NO
6. HAVE YOU EVER DONE ANYTHING, STARTED TO DO ANYTHING, OR PREPARED TO DO ANYTHING TO END YOUR LIFE?: NO
2. HAVE YOU ACTUALLY HAD ANY THOUGHTS OF KILLING YOURSELF?: NO

## 2024-05-10 ASSESSMENT — PAIN SCALES - GENERAL
PAINLEVEL_OUTOF10: 0 - NO PAIN

## 2024-05-10 NOTE — POST-PROCEDURE NOTE
Physician Transition of Care Summary  Invasive Cardiovascular Lab    Procedure Date: 5/10/2024  Attending:    * Israel Richardson - Primary  Resident/Fellow/Other Assistant: Surgeons and Role:  * No surgeons found with a matching role *    Indications:   Pre-op Diagnosis     * Shortness of breath [R06.02]     * Angina pectoris, unstable (Multi) [I20.0]    Post-procedure diagnosis:   Post-op Diagnosis     * Shortness of breath [R06.02]     * Angina pectoris, unstable (Multi) [I20.0]    Procedure(s):     * Left Heart Cath, With LV      Procedure Findings:   Normal coronaries, normal left ventricular function    Description of the Procedure:   Left heart catheterization coronary angiography left ventriculogram    Complications:   None    Stents/Implants:   Implants       No implant documentation for this case.            Anticoagulation/Antiplatelet Plan:   Intravenous heparin    Estimated Blood Loss:   0 mL    Anesthesia: Moderate Sedation Anesthesia Staff: No anesthesia staff entered.    Any Specimen(s) Removed:   No specimens collected during this procedure.    Disposition:   Medical therapy      Electronically signed by: Israel Richardson MD, 5/10/2024 10:28 AM

## 2024-05-10 NOTE — NURSING NOTE
Vasband off. Right radial WNL with a 2+ radial. No complaints of pain. VSS. Call light in reach, will continue to monitor.

## 2024-05-10 NOTE — PROGRESS NOTES
Patient is stable status post LHC under the care of Dr. Richardson.  Discussed results of procedure with patient and her daughter.  Pictures provided.  Findings of the LHC revealed normal coronary arteries and a normal LVEF.  Medical management is advised.  Patient will be scheduled to follow-up with Dr. León in 2 to 3 weeks for further recommendations as she continues to have intermittent symptoms.  All questions answered.  Both verbalized understanding.

## 2024-05-10 NOTE — NURSING NOTE
Patient verbalized understanding of discharge instructions, medications, and follow up appointments. Right radial WNL with no complaints of pain. VSS, up steady. IV out. All belongings sent home with patient. Patient discharged via wheelchair to home.

## 2024-05-13 LAB
ATRIAL RATE: 76 BPM
P AXIS: 17 DEGREES
P OFFSET: 217 MS
P ONSET: 156 MS
PR INTERVAL: 142 MS
Q ONSET: 227 MS
QRS COUNT: 12 BEATS
QRS DURATION: 82 MS
QT INTERVAL: 394 MS
QTC CALCULATION(BAZETT): 443 MS
QTC FREDERICIA: 426 MS
R AXIS: 9 DEGREES
T AXIS: 9 DEGREES
T OFFSET: 424 MS
VENTRICULAR RATE: 76 BPM

## 2024-05-14 VITALS
HEIGHT: 68 IN | HEART RATE: 72 BPM | RESPIRATION RATE: 20 BRPM | WEIGHT: 218.7 LBS | OXYGEN SATURATION: 99 % | DIASTOLIC BLOOD PRESSURE: 103 MMHG | SYSTOLIC BLOOD PRESSURE: 187 MMHG | BODY MASS INDEX: 33.15 KG/M2 | TEMPERATURE: 36.1 F

## 2024-05-21 ENCOUNTER — OFFICE VISIT (OUTPATIENT)
Dept: CARDIOLOGY | Facility: CLINIC | Age: 69
End: 2024-05-21
Payer: COMMERCIAL

## 2024-05-21 VITALS
HEART RATE: 84 BPM | HEIGHT: 68 IN | SYSTOLIC BLOOD PRESSURE: 128 MMHG | BODY MASS INDEX: 32.98 KG/M2 | DIASTOLIC BLOOD PRESSURE: 78 MMHG | WEIGHT: 217.6 LBS

## 2024-05-21 DIAGNOSIS — Z95.818 PRESENCE OF WATCHMAN LEFT ATRIAL APPENDAGE CLOSURE DEVICE: ICD-10-CM

## 2024-05-21 DIAGNOSIS — R07.9 CHEST PAIN, UNSPECIFIED TYPE: ICD-10-CM

## 2024-05-21 DIAGNOSIS — I48.0 PAROXYSMAL ATRIAL FIBRILLATION WITH RVR (MULTI): ICD-10-CM

## 2024-05-21 DIAGNOSIS — Z78.9 NEVER SMOKED ANY SUBSTANCE: ICD-10-CM

## 2024-05-21 DIAGNOSIS — I10 ESSENTIAL HYPERTENSION: ICD-10-CM

## 2024-05-21 PROCEDURE — 1160F RVW MEDS BY RX/DR IN RCRD: CPT | Performed by: INTERNAL MEDICINE

## 2024-05-21 PROCEDURE — 1036F TOBACCO NON-USER: CPT | Performed by: INTERNAL MEDICINE

## 2024-05-21 PROCEDURE — 1123F ACP DISCUSS/DSCN MKR DOCD: CPT | Performed by: INTERNAL MEDICINE

## 2024-05-21 PROCEDURE — 3048F LDL-C <100 MG/DL: CPT | Performed by: INTERNAL MEDICINE

## 2024-05-21 PROCEDURE — 3051F HG A1C>EQUAL 7.0%<8.0%: CPT | Performed by: INTERNAL MEDICINE

## 2024-05-21 PROCEDURE — 3008F BODY MASS INDEX DOCD: CPT | Performed by: INTERNAL MEDICINE

## 2024-05-21 PROCEDURE — 3074F SYST BP LT 130 MM HG: CPT | Performed by: INTERNAL MEDICINE

## 2024-05-21 PROCEDURE — 99214 OFFICE O/P EST MOD 30 MIN: CPT | Performed by: INTERNAL MEDICINE

## 2024-05-21 PROCEDURE — 3061F NEG MICROALBUMINURIA REV: CPT | Performed by: INTERNAL MEDICINE

## 2024-05-21 PROCEDURE — 1159F MED LIST DOCD IN RCRD: CPT | Performed by: INTERNAL MEDICINE

## 2024-05-21 PROCEDURE — 3078F DIAST BP <80 MM HG: CPT | Performed by: INTERNAL MEDICINE

## 2024-05-21 NOTE — PATIENT INSTRUCTIONS
Follow up office visit in 3 months.    Continue same medications/treatment.  Patient educated on proper medication use.  Patient educated on risk factor modification.  Please bring any lab results from other providers / physicians to your next appointment.    Please bring all medicines, vitamins and herbal supplements with you when you come to the office.    Prescriptions will not be filled unless you are compliant with your follow up appointments or have a follow up  appointment scheduled as per instruction of your physician.  Refills should be requested at the time of  Your visit.    Lizet RAMON LPN, am scribing for and in the presence of  Dr. Chemo León MD

## 2024-05-21 NOTE — PROGRESS NOTES
CARDIOLOGY OFFICE VISIT      CHIEF COMPLAINT  Chief Complaint   Patient presents with    Post-Cath     Follow up s/p [facility]        HISTORY OF PRESENT ILLNESS  Bella Leone is a 68 y.o. year old female patient with paroxysmal A-fib s/p Watchman placement about a year ago, hypertension and atypical chest pain for which she had a stress test in 2022 that was normal.  However she continues to complain of some increased chest pain and shortness of breath which tends to be worse when she walks her dog.  She subsequently had cardiac catheterization on 5/10/2024 that showed normal coronaries.  She had a recent ER visit where blood pressure was in the 160s but otherwise she has no diagnosis of hypertension.    ASSESSMENT AND PLAN  1.  Chest pain: This appears to be atypical chest pain with normal cardiac catheterization as noted above.  Patient has been reassured.  2.  Possible episodic hypertension: I advised patient to monitor her blood pressure at home as she may have episodes of hypertension which may be contributing to her symptoms for which we will need to start her on blood pressure medications.  She will follow-up as scheduled based on the results of her blood pressure monitoring at home.    Diagnoses and all orders for this visit:  Chest pain, unspecified type  Presence of Watchman left atrial appendage closure device  Paroxysmal atrial fibrillation with RVR (Multi)  Essential hypertension  BMI 33.0-33.9,adult  Never smoked any substance      Recent Cardiovascular Testing:    Echo-  Stress-  Cath-  Carotid Ultrasound-    Past Medical History  Past Medical History:   Diagnosis Date    Personal history of other diseases of the circulatory system 06/30/2021    History of hypertension    Personal history of other diseases of the digestive system 03/08/2017    History of gastrointestinal hemorrhage    Personal history of other diseases of the digestive system 03/08/2017    History of small bowel obstruction    Personal  history of other specified conditions 06/30/2021    History of chest pain    Unspecified atrial fibrillation (Multi) 11/25/2019    Atrial fibrillation with RVR       Social History  Social History     Tobacco Use    Smoking status: Never     Passive exposure: Never    Smokeless tobacco: Never   Vaping Use    Vaping status: Never Used   Substance Use Topics    Alcohol use: Yes     Comment: 3-4x a year    Drug use: Never       Family History     Family History   Problem Relation Name Age of Onset    Hypertension Mother      Diabetes Mother      Other (cabg) Mother      Other (CARDIAC DISORDER) Mother      Other (CARDIAC PACEMAKER) Mother      Other (CEREBROVASCULAR ACCIDENT) Mother      Heart attack Mother      Kidney disease Father      Hypertension Father      Diabetes Father      Other (CARDIAC DISORDER) Father      Other (CEREBROVASCULAR ACCIDENT) Father          Allergies:  Allergies   Allergen Reactions    Adhesive Tape-Silicones Unknown    Atorvastatin Other     myalgia        Outpatient Medications:  Current Outpatient Medications   Medication Instructions    albuterol 90 mcg/actuation inhaler Inhale 2 puffs into the lungs every 6 hours as needed for Wheezing    aspirin 81 mg, oral, Daily    ferrous sulfate 325 (65 Fe) MG EC tablet 1 tablet, oral, Daily with breakfast, Do not crush, chew, or split.    omeprazole (PRILOSEC) 40 mg, oral, Daily, Do not crush or chew.    OneTouch Ultra Test strip use 1 strip to check glucose once daily    Tradjenta 5 mg, oral, Daily        Recent Lab Results:    CBC:   Lab Results   Component Value Date    WBC 6.7 05/03/2024    RBC 4.58 05/03/2024    HGB 11.2 (L) 05/03/2024    HCT 37.5 05/03/2024     05/03/2024        CMP:    Lab Results   Component Value Date     05/03/2024    K 4.4 05/03/2024     05/03/2024    CO2 27 05/03/2024    BUN 16 05/03/2024    CREATININE 0.83 05/03/2024    GLUCOSE 120 (H) 05/03/2024    CALCIUM 9.1 05/03/2024       Magnesium:    Lab  Results   Component Value Date    MG 2.00 09/10/2022       Lipid Profile:    Lab Results   Component Value Date    TRIG 160 (H) 05/03/2024    HDL 46.6 05/03/2024    LDLCALC 67 05/03/2024       TSH:    Lab Results   Component Value Date    TSH 1.38 08/04/2021       BNP:   Lab Results   Component Value Date    BNP 31 09/25/2022        PT/INR:    Lab Results   Component Value Date    PROTIME 13.3 09/25/2022    INR 1.1 09/25/2022       HgBA1c:    Lab Results   Component Value Date    HGBA1C 7.0 (H) 05/03/2024       BMP:  Lab Results   Component Value Date     05/03/2024     05/03/2024     01/31/2024    K 4.4 05/03/2024    K 4.4 05/03/2024    K 3.9 01/31/2024     05/03/2024     05/03/2024     01/31/2024    CO2 27 05/03/2024    CO2 28 05/03/2024    CO2 27 01/31/2024    BUN 16 05/03/2024    BUN 16 05/03/2024    BUN 13 01/31/2024    CREATININE 0.83 05/03/2024    CREATININE 0.84 05/03/2024    CREATININE 0.71 01/31/2024       CBC:  Lab Results   Component Value Date    WBC 6.7 05/03/2024    WBC 6.5 05/03/2024    WBC 5.8 01/31/2024    RBC 4.58 05/03/2024    RBC 4.49 05/03/2024    RBC 4.16 01/31/2024    HGB 11.2 (L) 05/03/2024    HGB 10.7 (L) 05/03/2024    HGB 9.4 (L) 01/31/2024    HCT 37.5 05/03/2024    HCT 37.0 05/03/2024    HCT 32.7 (L) 01/31/2024    MCV 82 05/03/2024    MCV 82 05/03/2024    MCV 79 (L) 01/31/2024    MCH 24.5 (L) 05/03/2024    MCH 23.8 (L) 05/03/2024    MCH 22.6 (L) 01/31/2024    MCHC 29.9 (L) 05/03/2024    MCHC 28.9 (L) 05/03/2024    MCHC 28.7 (L) 01/31/2024    RDW 19.1 (H) 05/03/2024    RDW 19.6 (H) 05/03/2024    RDW 17.5 (H) 01/31/2024     05/03/2024     05/03/2024     01/31/2024       Cardiac Enzymes:    Lab Results   Component Value Date    TROPHS 4 09/25/2022    TROPHS 5 09/25/2022    TROPHS 5 09/25/2022       Hepatic Function Panel:    Lab Results   Component Value Date    ALKPHOS 81 05/03/2024    ALT 15 05/03/2024    AST 15 05/03/2024    PROT  6.8 05/03/2024    BILITOT 0.4 05/03/2024         REVIEW OF SYSTEMS  Review of Systems   All other systems reviewed and are negative.      VITALS  Vitals:    05/21/24 1151   BP: 128/78   Pulse: 84     Wt Readings from Last 4 Encounters:   05/21/24 98.7 kg (217 lb 9.6 oz)   05/10/24 99.2 kg (218 lb 11.1 oz)   05/07/24 99.3 kg (219 lb)   05/03/24 98.2 kg (216 lb 9.6 oz)       PHYSICAL EXAM  Vitals and nursing note reviewed.   Constitutional:       Appearance: Healthy appearance.   Eyes:      Conjunctiva/sclera: Conjunctivae normal.      Pupils: Pupils are equal, round, and reactive to light.   Pulmonary:      Effort: Pulmonary effort is normal.      Breath sounds: Normal breath sounds.   Cardiovascular:      PMI at left midclavicular line. Normal rate. Regular rhythm.      Murmurs: There is a grade 1/6 systolic murmur at the ULSB.      No gallop.  No click. No rub.   Pulses:     Intact distal pulses.   Edema:     Peripheral edema absent.   Musculoskeletal: Normal range of motion. Skin:     General: Skin is warm and dry.   Neurological:      Mental Status: Alert and oriented to person, place and time.

## 2024-05-31 ENCOUNTER — APPOINTMENT (OUTPATIENT)
Dept: CARDIOLOGY | Facility: CLINIC | Age: 69
End: 2024-05-31
Payer: COMMERCIAL

## 2024-06-20 DIAGNOSIS — E11.9 TYPE 2 DIABETES MELLITUS WITHOUT COMPLICATION, WITHOUT LONG-TERM CURRENT USE OF INSULIN (MULTI): ICD-10-CM

## 2024-06-20 RX ORDER — BLOOD SUGAR DIAGNOSTIC
STRIP MISCELLANEOUS
Qty: 100 EACH | Refills: 3 | Status: SHIPPED | OUTPATIENT
Start: 2024-06-20

## 2024-06-20 NOTE — TELEPHONE ENCOUNTER
Rx Refill Request Telephone Encounter    Name:  Bella Leone  :  [id number]  Medication Name:  OneTouch Ultra Test strip   Specific Pharmacy location:  [facility]  Date of last appointment:    Date of next appointment:    Best number to reach patient:  [phone]

## 2024-07-22 ENCOUNTER — LAB (OUTPATIENT)
Dept: LAB | Facility: LAB | Age: 69
End: 2024-07-22
Payer: COMMERCIAL

## 2024-07-22 DIAGNOSIS — I10 ESSENTIAL HYPERTENSION: ICD-10-CM

## 2024-07-22 DIAGNOSIS — D64.9 CHRONIC ANEMIA: ICD-10-CM

## 2024-07-22 DIAGNOSIS — E78.2 MIXED HYPERLIPIDEMIA: ICD-10-CM

## 2024-07-22 DIAGNOSIS — E11.9 TYPE 2 DIABETES MELLITUS WITHOUT COMPLICATION, WITHOUT LONG-TERM CURRENT USE OF INSULIN (MULTI): ICD-10-CM

## 2024-07-22 LAB
ALBUMIN SERPL BCP-MCNC: 4 G/DL (ref 3.4–5)
ALP SERPL-CCNC: 75 U/L (ref 33–136)
ALT SERPL W P-5'-P-CCNC: 17 U/L (ref 7–45)
ANION GAP SERPL CALC-SCNC: 11 MMOL/L (ref 10–20)
AST SERPL W P-5'-P-CCNC: 18 U/L (ref 9–39)
BASOPHILS # BLD AUTO: 0.02 X10*3/UL (ref 0–0.1)
BASOPHILS NFR BLD AUTO: 0.3 %
BILIRUB SERPL-MCNC: 0.4 MG/DL (ref 0–1.2)
BUN SERPL-MCNC: 14 MG/DL (ref 6–23)
CALCIUM SERPL-MCNC: 8.8 MG/DL (ref 8.6–10.3)
CHLORIDE SERPL-SCNC: 108 MMOL/L (ref 98–107)
CHOLEST SERPL-MCNC: 151 MG/DL (ref 0–199)
CHOLESTEROL/HDL RATIO: 3.7
CO2 SERPL-SCNC: 27 MMOL/L (ref 21–32)
CREAT SERPL-MCNC: 0.72 MG/DL (ref 0.5–1.05)
EGFRCR SERPLBLD CKD-EPI 2021: >90 ML/MIN/1.73M*2
EOSINOPHIL # BLD AUTO: 0.11 X10*3/UL (ref 0–0.7)
EOSINOPHIL NFR BLD AUTO: 1.9 %
ERYTHROCYTE [DISTWIDTH] IN BLOOD BY AUTOMATED COUNT: 17.9 % (ref 11.5–14.5)
EST. AVERAGE GLUCOSE BLD GHB EST-MCNC: 146 MG/DL
GLUCOSE SERPL-MCNC: 103 MG/DL (ref 74–99)
HBA1C MFR BLD: 6.7 %
HCT VFR BLD AUTO: 39.2 % (ref 36–46)
HDLC SERPL-MCNC: 41 MG/DL
HGB BLD-MCNC: 11.6 G/DL (ref 12–16)
IMM GRANULOCYTES # BLD AUTO: 0.03 X10*3/UL (ref 0–0.7)
IMM GRANULOCYTES NFR BLD AUTO: 0.5 % (ref 0–0.9)
LDLC SERPL CALC-MCNC: 77 MG/DL
LYMPHOCYTES # BLD AUTO: 2.7 X10*3/UL (ref 1.2–4.8)
LYMPHOCYTES NFR BLD AUTO: 46.8 %
MCH RBC QN AUTO: 26 PG (ref 26–34)
MCHC RBC AUTO-ENTMCNC: 29.6 G/DL (ref 32–36)
MCV RBC AUTO: 88 FL (ref 80–100)
MONOCYTES # BLD AUTO: 0.45 X10*3/UL (ref 0.1–1)
MONOCYTES NFR BLD AUTO: 7.8 %
NEUTROPHILS # BLD AUTO: 2.46 X10*3/UL (ref 1.2–7.7)
NEUTROPHILS NFR BLD AUTO: 42.7 %
NON HDL CHOLESTEROL: 110 MG/DL (ref 0–149)
NRBC BLD-RTO: 0 /100 WBCS (ref 0–0)
PLATELET # BLD AUTO: 279 X10*3/UL (ref 150–450)
POTASSIUM SERPL-SCNC: 4.1 MMOL/L (ref 3.5–5.3)
PROT SERPL-MCNC: 6.2 G/DL (ref 6.4–8.2)
RBC # BLD AUTO: 4.46 X10*6/UL (ref 4–5.2)
SODIUM SERPL-SCNC: 142 MMOL/L (ref 136–145)
TRIGL SERPL-MCNC: 164 MG/DL (ref 0–149)
VLDL: 33 MG/DL (ref 0–40)
WBC # BLD AUTO: 5.8 X10*3/UL (ref 4.4–11.3)

## 2024-07-22 PROCEDURE — 36415 COLL VENOUS BLD VENIPUNCTURE: CPT

## 2024-07-22 PROCEDURE — 83036 HEMOGLOBIN GLYCOSYLATED A1C: CPT

## 2024-07-22 PROCEDURE — 85025 COMPLETE CBC W/AUTO DIFF WBC: CPT

## 2024-07-22 PROCEDURE — 80053 COMPREHEN METABOLIC PANEL: CPT

## 2024-07-22 PROCEDURE — 80061 LIPID PANEL: CPT

## 2024-07-31 ENCOUNTER — APPOINTMENT (OUTPATIENT)
Dept: PRIMARY CARE | Facility: CLINIC | Age: 69
End: 2024-07-31
Payer: COMMERCIAL

## 2024-07-31 VITALS
OXYGEN SATURATION: 97 % | SYSTOLIC BLOOD PRESSURE: 120 MMHG | DIASTOLIC BLOOD PRESSURE: 62 MMHG | HEART RATE: 70 BPM | TEMPERATURE: 97.8 F | HEIGHT: 68 IN | BODY MASS INDEX: 32.74 KG/M2 | RESPIRATION RATE: 14 BRPM | WEIGHT: 216 LBS

## 2024-07-31 DIAGNOSIS — M47.16 LUMBAR SPONDYLOSIS WITH MYELOPATHY: ICD-10-CM

## 2024-07-31 DIAGNOSIS — R29.898 BILATERAL LEG WEAKNESS: ICD-10-CM

## 2024-07-31 DIAGNOSIS — M48.062 SPINAL STENOSIS OF LUMBAR REGION WITH NEUROGENIC CLAUDICATION: ICD-10-CM

## 2024-07-31 DIAGNOSIS — R53.82 CHRONIC FATIGUE: ICD-10-CM

## 2024-07-31 DIAGNOSIS — E78.2 MIXED HYPERLIPIDEMIA: ICD-10-CM

## 2024-07-31 DIAGNOSIS — I10 ESSENTIAL HYPERTENSION: ICD-10-CM

## 2024-07-31 DIAGNOSIS — M54.50 CHRONIC BILATERAL LOW BACK PAIN WITHOUT SCIATICA: ICD-10-CM

## 2024-07-31 DIAGNOSIS — D64.9 CHRONIC ANEMIA: ICD-10-CM

## 2024-07-31 DIAGNOSIS — E11.9 TYPE 2 DIABETES MELLITUS WITHOUT COMPLICATION, WITHOUT LONG-TERM CURRENT USE OF INSULIN (MULTI): Primary | ICD-10-CM

## 2024-07-31 DIAGNOSIS — G89.29 CHRONIC BILATERAL LOW BACK PAIN WITHOUT SCIATICA: ICD-10-CM

## 2024-07-31 PROCEDURE — 3044F HG A1C LEVEL LT 7.0%: CPT | Performed by: FAMILY MEDICINE

## 2024-07-31 PROCEDURE — 3008F BODY MASS INDEX DOCD: CPT | Performed by: FAMILY MEDICINE

## 2024-07-31 PROCEDURE — 1160F RVW MEDS BY RX/DR IN RCRD: CPT | Performed by: FAMILY MEDICINE

## 2024-07-31 PROCEDURE — 1123F ACP DISCUSS/DSCN MKR DOCD: CPT | Performed by: FAMILY MEDICINE

## 2024-07-31 PROCEDURE — 3061F NEG MICROALBUMINURIA REV: CPT | Performed by: FAMILY MEDICINE

## 2024-07-31 PROCEDURE — 99214 OFFICE O/P EST MOD 30 MIN: CPT | Performed by: FAMILY MEDICINE

## 2024-07-31 PROCEDURE — 3074F SYST BP LT 130 MM HG: CPT | Performed by: FAMILY MEDICINE

## 2024-07-31 PROCEDURE — 1036F TOBACCO NON-USER: CPT | Performed by: FAMILY MEDICINE

## 2024-07-31 PROCEDURE — 3048F LDL-C <100 MG/DL: CPT | Performed by: FAMILY MEDICINE

## 2024-07-31 PROCEDURE — 1159F MED LIST DOCD IN RCRD: CPT | Performed by: FAMILY MEDICINE

## 2024-07-31 PROCEDURE — 3078F DIAST BP <80 MM HG: CPT | Performed by: FAMILY MEDICINE

## 2024-07-31 RX ORDER — ROSUVASTATIN CALCIUM 5 MG/1
5 TABLET, COATED ORAL DAILY
Qty: 30 TABLET | Refills: 3 | Status: SHIPPED | OUTPATIENT
Start: 2024-07-31 | End: 2024-11-28

## 2024-07-31 RX ORDER — LINAGLIPTIN 5 MG/1
5 TABLET, FILM COATED ORAL DAILY
Qty: 30 TABLET | Refills: 3 | Status: SHIPPED | OUTPATIENT
Start: 2024-07-31

## 2024-07-31 ASSESSMENT — ENCOUNTER SYMPTOMS
BLURRED VISION: 0
WEAKNESS: 1
HEMATURIA: 0
HUNGER: 0
POLYPHAGIA: 0
COUGH: 0
SHORTNESS OF BREATH: 0
NERVOUS/ANXIOUS: 0
SPEECH DIFFICULTY: 0
CONSTIPATION: 0
ABDOMINAL PAIN: 0
WHEEZING: 0
SEIZURES: 0
DIZZINESS: 0
VISUAL CHANGE: 0
TREMORS: 0
WEIGHT LOSS: 0
FEVER: 0
RHINORRHEA: 0
PALPITATIONS: 0
NUMBNESS: 1
CHILLS: 0
POLYDIPSIA: 0
HEADACHES: 0
FREQUENCY: 0
BACK PAIN: 1
DYSURIA: 0
DIARRHEA: 0
FATIGUE: 0
BLACKOUTS: 0
VOMITING: 0
SWEATS: 0
SORE THROAT: 0
CONFUSION: 0

## 2024-07-31 NOTE — ASSESSMENT & PLAN NOTE
MRI of  Lumbar spine ordered because persistent and worsening symptomatology noted above and failure of conservative management including physical therapy, NSAIDS, anlgesics and muscle relaxant. Lumbar spine

## 2024-07-31 NOTE — ASSESSMENT & PLAN NOTE
Diabetes Mellitus type II, under good control.  1. Rx changes:   2. Education: Reviewed ‘ABCs’ of diabetes management (respective goals in parentheses):  A1C (<7), blood pressure (<130/80), and cholesterol (LDL <100).  3. Compliance at present is estimated to be good. Efforts to improve compliance (if necessary) will be directed at dietary modifications: and increased exercise.  4. Follow up: 3 months

## 2024-07-31 NOTE — PROGRESS NOTES
Subjective   Patient ID: Bella Leone is a 69 y.o. female who presents for Follow-up (Diabetes, Hypertension, Hyperlipidemia//) and Back Pain.    Patient is here for follow-up on hypertension and hyperlipidemia. She has no chest pain, dyspnea, exertional chest pressure/discomfort, near-syncope, orthopnea, palpitations, paroxysmal nocturnal dyspnea, and syncope. Taking her medication regularly with no side effects.      Diabetes  She presents for her follow-up diabetic visit. She has type 2 diabetes mellitus. No MedicAlert identification noted. The initial diagnosis of diabetes was made 2 years ago. Her disease course has been stable. There are no hypoglycemic associated symptoms. Pertinent negatives for hypoglycemia include no confusion, dizziness, headaches, hunger, mood changes, nervousness/anxiousness, pallor, seizures, sleepiness, speech difficulty, sweats or tremors. Associated symptoms include weakness. Pertinent negatives for diabetes include no blurred vision, no chest pain, no fatigue, no foot paresthesias, no foot ulcerations, no polydipsia, no polyphagia, no polyuria, no visual change and no weight loss. There are no hypoglycemic complications. Pertinent negatives for hypoglycemia complications include no blackouts, no hospitalization, no nocturnal hypoglycemia, no required assistance and no required glucagon injection. Symptoms are stable. Risk factors for coronary artery disease include diabetes mellitus, dyslipidemia, hypertension, obesity and post-menopausal. Current diabetic treatment includes oral agent (monotherapy).   Back Pain  This is a chronic problem. The current episode started more than 1 month ago. The problem occurs constantly. The problem has been waxing and waning since onset. The pain is present in the lumbar spine. The pain does not radiate. The pain is at a severity of 9/10. The pain is severe. The symptoms are aggravated by bending, position and twisting. Stiffness is present  "All day. Associated symptoms include numbness and weakness. Pertinent negatives include no abdominal pain, chest pain, dysuria, fever, headaches or weight loss.   She complains of pain both legs with walking and weakness both legs.     Review of Systems   Constitutional:  Negative for chills, fatigue, fever and weight loss.   HENT:  Negative for congestion, ear pain, nosebleeds, rhinorrhea and sore throat.    Eyes:  Negative for blurred vision.   Respiratory:  Negative for cough, shortness of breath and wheezing.    Cardiovascular:  Negative for chest pain, palpitations and leg swelling.   Gastrointestinal:  Negative for abdominal pain, constipation, diarrhea and vomiting.   Endocrine: Negative for polydipsia, polyphagia and polyuria.   Genitourinary:  Negative for dysuria, frequency and hematuria.   Musculoskeletal:  Positive for back pain and gait problem.   Skin:  Negative for pallor.   Neurological:  Positive for weakness and numbness. Negative for dizziness, tremors, seizures, speech difficulty and headaches.   Psychiatric/Behavioral:  Negative for confusion. The patient is not nervous/anxious.        Objective   /62   Pulse 70   Temp 36.6 °C (97.8 °F)   Resp 14   Ht 1.727 m (5' 8\")   Wt 98 kg (216 lb)   SpO2 97%   BMI 32.84 kg/m²     Physical Exam  Constitutional:       General: She is not in acute distress.     Appearance: Normal appearance.   HENT:      Head: Normocephalic and atraumatic.      Mouth/Throat:      Mouth: Mucous membranes are moist.      Pharynx: Oropharynx is clear. No oropharyngeal exudate or posterior oropharyngeal erythema.   Eyes:      General: No scleral icterus.     Extraocular Movements: Extraocular movements intact.      Pupils: Pupils are equal, round, and reactive to light.   Cardiovascular:      Rate and Rhythm: Normal rate and regular rhythm.      Pulses: Normal pulses.      Heart sounds: No murmur heard.     No friction rub. No gallop.   Pulmonary:      Effort: " Pulmonary effort is normal.      Breath sounds: No wheezing, rhonchi or rales.   Musculoskeletal:      Comments: Back exam revealed bilateral paraspinal tenderness in the lumbar region which reduced forward flexion of the spine.  Straight leg raising was reduced bilaterally, muscle strength was 4/5 Right lower extremity and normal on the left. No ankle clonus and Babinski sign was negative.  Has full complement of peripheral pulses in the lower extremities.            Skin:     General: Skin is warm.      Coloration: Skin is not jaundiced or pale.      Findings: No erythema or rash.   Neurological:      General: No focal deficit present.      Mental Status: She is alert and oriented to person, place, and time.      Cranial Nerves: No cranial nerve deficit.      Sensory: No sensory deficit.      Coordination: Coordination normal.      Gait: Gait normal.         Lab on 07/22/2024   Component Date Value Ref Range Status    WBC 07/22/2024 5.8  4.4 - 11.3 x10*3/uL Final    nRBC 07/22/2024 0.0  0.0 - 0.0 /100 WBCs Final    RBC 07/22/2024 4.46  4.00 - 5.20 x10*6/uL Final    Hemoglobin 07/22/2024 11.6 (L)  12.0 - 16.0 g/dL Final    Hematocrit 07/22/2024 39.2  36.0 - 46.0 % Final    MCV 07/22/2024 88  80 - 100 fL Final    MCH 07/22/2024 26.0  26.0 - 34.0 pg Final    MCHC 07/22/2024 29.6 (L)  32.0 - 36.0 g/dL Final    RDW 07/22/2024 17.9 (H)  11.5 - 14.5 % Final    Platelets 07/22/2024 279  150 - 450 x10*3/uL Final    Neutrophils % 07/22/2024 42.7  40.0 - 80.0 % Final    Immature Granulocytes %, Automated 07/22/2024 0.5  0.0 - 0.9 % Final    Immature Granulocyte Count (IG) includes promyelocytes, myelocytes and metamyelocytes but does not include bands. Percent differential counts (%) should be interpreted in the context of the absolute cell counts (cells/UL).    Lymphocytes % 07/22/2024 46.8  13.0 - 44.0 % Final    Monocytes % 07/22/2024 7.8  2.0 - 10.0 % Final    Eosinophils % 07/22/2024 1.9  0.0 - 6.0 % Final    Basophils  % 07/22/2024 0.3  0.0 - 2.0 % Final    Neutrophils Absolute 07/22/2024 2.46  1.20 - 7.70 x10*3/uL Final    Percent differential counts (%) should be interpreted in the context of the absolute cell counts (cells/uL).    Immature Granulocytes Absolute, Au* 07/22/2024 0.03  0.00 - 0.70 x10*3/uL Final    Lymphocytes Absolute 07/22/2024 2.70  1.20 - 4.80 x10*3/uL Final    Monocytes Absolute 07/22/2024 0.45  0.10 - 1.00 x10*3/uL Final    Eosinophils Absolute 07/22/2024 0.11  0.00 - 0.70 x10*3/uL Final    Basophils Absolute 07/22/2024 0.02  0.00 - 0.10 x10*3/uL Final    Glucose 07/22/2024 103 (H)  74 - 99 mg/dL Final    Sodium 07/22/2024 142  136 - 145 mmol/L Final    Potassium 07/22/2024 4.1  3.5 - 5.3 mmol/L Final    Chloride 07/22/2024 108 (H)  98 - 107 mmol/L Final    Bicarbonate 07/22/2024 27  21 - 32 mmol/L Final    Anion Gap 07/22/2024 11  10 - 20 mmol/L Final    Urea Nitrogen 07/22/2024 14  6 - 23 mg/dL Final    Creatinine 07/22/2024 0.72  0.50 - 1.05 mg/dL Final    eGFR 07/22/2024 >90  >60 mL/min/1.73m*2 Final    Calculations of estimated GFR are performed using the 2021 CKD-EPI Study Refit equation without the race variable for the IDMS-Traceable creatinine methods.  https://jasn.asnjournals.org/content/early/2021/09/22/ASN.0789108137    Calcium 07/22/2024 8.8  8.6 - 10.3 mg/dL Final    Albumin 07/22/2024 4.0  3.4 - 5.0 g/dL Final    Alkaline Phosphatase 07/22/2024 75  33 - 136 U/L Final    Total Protein 07/22/2024 6.2 (L)  6.4 - 8.2 g/dL Final    AST 07/22/2024 18  9 - 39 U/L Final    Bilirubin, Total 07/22/2024 0.4  0.0 - 1.2 mg/dL Final    ALT 07/22/2024 17  7 - 45 U/L Final    Patients treated with Sulfasalazine may generate falsely decreased results for ALT.    Hemoglobin A1C 07/22/2024 6.7 (H)  see below % Final    Estimated Average Glucose 07/22/2024 146  Not Established mg/dL Final    Cholesterol 07/22/2024 151  0 - 199 mg/dL Final          Age      Desirable   Borderline High   High     0-19 Y     0 -  169       170 - 199     >/= 200    20-24 Y     0 - 189       190 - 224     >/= 225         >24 Y     0 - 199       200 - 239     >/= 240   **All ranges are based on fasting samples. Specific   therapeutic targets will vary based on patient-specific   cardiac risk.    Pediatric guidelines reference:Pediatrics 2011, 128(S5).Adult guidelines reference: NCEP ATPIII Guidelines,FELA 2001, 258:2486-97    Venipuncture immediately after or during the administration of Metamizole may lead to falsely low results. Testing should be performed immediately prior to Metamizole dosing.    HDL-Cholesterol 07/22/2024 41.0  mg/dL Final      Age       Very Low   Low     Normal    High    0-19 Y    < 35      < 40     40-45     ----  20-24 Y    ----     < 40      >45      ----        >24 Y      ----     < 40     40-60      >60      Cholesterol/HDL Ratio 07/22/2024 3.7   Final      Ref Values  Desirable  < 3.4  High Risk  > 5.0    LDL Calculated 07/22/2024 77  <=99 mg/dL Final                                Near   Borderline      AGE      Desirable  Optimal    High     High     Very High     0-19 Y     0 - 109     ---    110-129   >/= 130     ----    20-24 Y     0 - 119     ---    120-159   >/= 160     ----      >24 Y     0 -  99   100-129  130-159   160-189     >/=190      VLDL 07/22/2024 33  0 - 40 mg/dL Final    Triglycerides 07/22/2024 164 (H)  0 - 149 mg/dL Final       Age         Desirable   Borderline High   High     Very High   0 D-90 D    19 - 174         ----         ----        ----  91 D- 9 Y     0 -  74        75 -  99     >/= 100      ----    10-19 Y     0 -  89        90 - 129     >/= 130      ----    20-24 Y     0 - 114       115 - 149     >/= 150      ----         >24 Y     0 - 149       150 - 199    200- 499    >/= 500    Venipuncture immediately after or during the administration of Metamizole may lead to falsely low results. Testing should be performed immediately prior to Metamizole dosing.    Non HDL Cholesterol  07/22/2024 110  0 - 149 mg/dL Final          Age       Desirable   Borderline High   High     Very High     0-19 Y     0 - 119       120 - 144     >/= 145    >/= 160    20-24 Y     0 - 149       150 - 189     >/= 190      ----         >24 Y    30 mg/dL above LDL Cholesterol goal        Assessment/Plan   Problem List Items Addressed This Visit       Chronic fatigue     Order placed for CBC with Diff for further evaluation.            Relevant Orders    CBC and Auto Differential    Follow Up In [facility] - PCP    Chronic low back pain     Order placed for MR lumbar spine wo IV contrast for further evaluation.            Essential hypertension     Patient directed to check her blood pressure every day. Keep follow up with Dr. León in August.          Relevant Orders    Comprehensive Metabolic Panel    Follow Up In [facility] - PCP    Lumbar spondylosis with myelopathy     Patient was given an order for MRI Lumbar Spine w/o contrast for further evaluation.         Relevant Orders    MR lumbar spine wo IV contrast    Chronic anemia     CBC order placed for further evaluation.          Relevant Orders    CBC and Auto Differential    Follow Up In [facility] - PCP    Mixed hyperlipidemia     Worsening Condition. Patient was placed on Rosuvastatin 5 mg daily.          Relevant Medications    rosuvastatin (Crestor) 5 mg tablet    Other Relevant Orders    Lipid Panel    Follow Up In [facility] - PCP    Type 2 diabetes mellitus without complication, without long-term current use of insulin (Multi) - Primary     Diabetes Mellitus type II, under good control.  1. Rx changes:   2. Education: Reviewed ‘ABCs’ of diabetes management (respective goals in parentheses):  A1C (<7), blood pressure (<130/80), and cholesterol (LDL <100).  3. Compliance at present is estimated to be good. Efforts to improve compliance (if necessary) will be directed at dietary modifications: and increased  exercise.  4. Follow up: 3 months          Relevant Medications    linaGLIPtin (Tradjenta) 5 mg tablet    Other Relevant Orders    Follow Up In [facility] - PCP    Hemoglobin A1C    Spinal stenosis of lumbar region with neurogenic claudication     MRI of  Lumbar spine ordered because persistent and worsening symptomatology noted above and failure of conservative management including physical therapy, NSAIDS, anlgesics and muscle relaxant. Lumbar spine         Relevant Orders    MR lumbar spine wo IV contrast    Bilateral leg weakness     Patient was given an order for MRI Lumbar Spine w/o contrast for further evaluation.          Relevant Orders    MR lumbar spine wo IV contrast        Scribe Attestation  By signing my name below, I, Patria Barlow, Scribe   attest that this documentation has been prepared under the direction and in the presence of Hany Leo MD .

## 2024-08-20 ENCOUNTER — APPOINTMENT (OUTPATIENT)
Dept: RADIOLOGY | Facility: HOSPITAL | Age: 69
End: 2024-08-20
Payer: COMMERCIAL

## 2024-08-27 ENCOUNTER — APPOINTMENT (OUTPATIENT)
Dept: CARDIOLOGY | Facility: CLINIC | Age: 69
End: 2024-08-27
Payer: COMMERCIAL

## 2024-08-27 VITALS
DIASTOLIC BLOOD PRESSURE: 90 MMHG | BODY MASS INDEX: 32.13 KG/M2 | HEART RATE: 76 BPM | SYSTOLIC BLOOD PRESSURE: 140 MMHG | HEIGHT: 68 IN | WEIGHT: 212 LBS

## 2024-08-27 DIAGNOSIS — Z95.818 PRESENCE OF WATCHMAN LEFT ATRIAL APPENDAGE CLOSURE DEVICE: ICD-10-CM

## 2024-08-27 DIAGNOSIS — I48.0 PAROXYSMAL ATRIAL FIBRILLATION WITH RVR (MULTI): ICD-10-CM

## 2024-08-27 DIAGNOSIS — Z78.9 NEVER SMOKED ANY SUBSTANCE: ICD-10-CM

## 2024-08-27 DIAGNOSIS — E11.9 TYPE 2 DIABETES MELLITUS WITHOUT COMPLICATION, WITHOUT LONG-TERM CURRENT USE OF INSULIN (MULTI): ICD-10-CM

## 2024-08-27 DIAGNOSIS — I10 ESSENTIAL HYPERTENSION: ICD-10-CM

## 2024-08-27 DIAGNOSIS — E78.2 MIXED HYPERLIPIDEMIA: ICD-10-CM

## 2024-08-27 PROCEDURE — 1123F ACP DISCUSS/DSCN MKR DOCD: CPT | Performed by: INTERNAL MEDICINE

## 2024-08-27 PROCEDURE — 3008F BODY MASS INDEX DOCD: CPT | Performed by: INTERNAL MEDICINE

## 2024-08-27 PROCEDURE — 1036F TOBACCO NON-USER: CPT | Performed by: INTERNAL MEDICINE

## 2024-08-27 PROCEDURE — 3061F NEG MICROALBUMINURIA REV: CPT | Performed by: INTERNAL MEDICINE

## 2024-08-27 PROCEDURE — 99214 OFFICE O/P EST MOD 30 MIN: CPT | Performed by: INTERNAL MEDICINE

## 2024-08-27 PROCEDURE — 4010F ACE/ARB THERAPY RXD/TAKEN: CPT | Performed by: INTERNAL MEDICINE

## 2024-08-27 PROCEDURE — 3044F HG A1C LEVEL LT 7.0%: CPT | Performed by: INTERNAL MEDICINE

## 2024-08-27 PROCEDURE — 3048F LDL-C <100 MG/DL: CPT | Performed by: INTERNAL MEDICINE

## 2024-08-27 PROCEDURE — 1159F MED LIST DOCD IN RCRD: CPT | Performed by: INTERNAL MEDICINE

## 2024-08-27 PROCEDURE — 3074F SYST BP LT 130 MM HG: CPT | Performed by: INTERNAL MEDICINE

## 2024-08-27 PROCEDURE — 3078F DIAST BP <80 MM HG: CPT | Performed by: INTERNAL MEDICINE

## 2024-08-27 RX ORDER — LOSARTAN POTASSIUM 25 MG/1
25 TABLET ORAL DAILY
Qty: 90 TABLET | Refills: 3 | Status: SHIPPED | OUTPATIENT
Start: 2024-08-27 | End: 2025-08-27

## 2024-08-27 NOTE — PROGRESS NOTES
CARDIOLOGY OFFICE VISIT      CHIEF COMPLAINT  Chief Complaint   Patient presents with    Follow-up     3 month follow up        HISTORY OF PRESENT ILLNESS  Bella Leone is a 69 y.o. year old female patient with paroxysmal A-fib s/p Watchman device about a year ago, hypertension and atypical chest pain for which she had a stress test in 2022 that was normal.  However she complained of recurrent chest pain when she walks dog and shortness of breath for which she had a cardiac catheterization in June 2024 that showed normal coronaries.  She has continued to do well with no recurrent chest pain.  However she has blood pressure that has been running a little bit high and she brought her home blood pressure readings which confirms elevated blood pressure.    ASSESSMENT AND PLAN  Hypertension: She appears to have benign essential hypertension and we will start her on losartan and advised her to continue to keep an eye on her blood pressure at home and return for follow-up as scheduled.  2.  Chest pain: This appears to be atypical chest pain with normal cardiac catheterization as noted above.    3.  Atrial fibrillation: Status post Watchman device implantation as noted above.  She continues with baby aspirin.    Problem List Items Addressed This Visit       Essential hypertension    Mixed hyperlipidemia    Paroxysmal atrial fibrillation with RVR (Multi)    Presence of Watchman left atrial appendage closure device    Type 2 diabetes mellitus without complication, without long-term current use of insulin (Multi)    Never smoked any substance    BMI 32.0-32.9,adult       Recent Cardiovascular Testing:    Echo-  Stress-  Cath-  Carotid Ultrasound-    Past Medical History  Past Medical History:   Diagnosis Date    Personal history of other diseases of the circulatory system 06/30/2021    History of hypertension    Personal history of other diseases of the digestive system 03/08/2017    History of gastrointestinal hemorrhage     Personal history of other diseases of the digestive system 03/08/2017    History of small bowel obstruction    Personal history of other specified conditions 06/30/2021    History of chest pain    Unspecified atrial fibrillation (Multi) 11/25/2019    Atrial fibrillation with RVR       Social History  Social History     Tobacco Use    Smoking status: Never     Passive exposure: Never    Smokeless tobacco: Never   Vaping Use    Vaping status: Never Used   Substance Use Topics    Alcohol use: Yes     Comment: 3-4x a year    Drug use: Never       Family History     Family History   Problem Relation Name Age of Onset    Hypertension Mother      Diabetes Mother      Other (cabg) Mother      Other (CARDIAC DISORDER) Mother      Other (CARDIAC PACEMAKER) Mother      Other (CEREBROVASCULAR ACCIDENT) Mother      Heart attack Mother      Kidney disease Father      Hypertension Father      Diabetes Father      Other (CARDIAC DISORDER) Father      Other (CEREBROVASCULAR ACCIDENT) Father          Allergies:  Allergies   Allergen Reactions    Adhesive Tape-Silicones Unknown    Atorvastatin Other     myalgia        Outpatient Medications:  Current Outpatient Medications   Medication Instructions    albuterol 90 mcg/actuation inhaler Inhale 2 puffs into the lungs every 6 hours as needed for Wheezing    aspirin 81 mg, oral, Daily    ferrous sulfate 325 (65 Fe) MG EC tablet 1 tablet, oral, Daily with breakfast, Do not crush, chew, or split.    omeprazole (PRILOSEC) 40 mg, oral, Daily, Do not crush or chew.    OneTouch Ultra Test strip use 1 strip to check glucose once daily    rosuvastatin (CRESTOR) 5 mg, oral, Daily    Tradjenta 5 mg, oral, Daily        Recent Lab Results:    CBC:   Lab Results   Component Value Date    WBC 5.8 07/22/2024    RBC 4.46 07/22/2024    HGB 11.6 (L) 07/22/2024    HCT 39.2 07/22/2024     07/22/2024        CMP:    Lab Results   Component Value Date     07/22/2024    K 4.1 07/22/2024      (H) 07/22/2024    CO2 27 07/22/2024    BUN 14 07/22/2024    CREATININE 0.72 07/22/2024    GLUCOSE 103 (H) 07/22/2024    CALCIUM 8.8 07/22/2024       Magnesium:    Lab Results   Component Value Date    MG 2.00 09/10/2022       Lipid Profile:    Lab Results   Component Value Date    TRIG 164 (H) 07/22/2024    HDL 41.0 07/22/2024    LDLCALC 77 07/22/2024       TSH:    Lab Results   Component Value Date    TSH 1.38 08/04/2021       BNP:   Lab Results   Component Value Date    BNP 31 09/25/2022        PT/INR:    Lab Results   Component Value Date    PROTIME 13.3 09/25/2022    INR 1.1 09/25/2022       HgBA1c:    Lab Results   Component Value Date    HGBA1C 6.7 (H) 07/22/2024       BMP:  Lab Results   Component Value Date     07/22/2024     05/03/2024     05/03/2024    K 4.1 07/22/2024    K 4.4 05/03/2024    K 4.4 05/03/2024     (H) 07/22/2024     05/03/2024     05/03/2024    CO2 27 07/22/2024    CO2 27 05/03/2024    CO2 28 05/03/2024    BUN 14 07/22/2024    BUN 16 05/03/2024    BUN 16 05/03/2024    CREATININE 0.72 07/22/2024    CREATININE 0.83 05/03/2024    CREATININE 0.84 05/03/2024       CBC:  Lab Results   Component Value Date    WBC 5.8 07/22/2024    WBC 6.7 05/03/2024    WBC 6.5 05/03/2024    RBC 4.46 07/22/2024    RBC 4.58 05/03/2024    RBC 4.49 05/03/2024    HGB 11.6 (L) 07/22/2024    HGB 11.2 (L) 05/03/2024    HGB 10.7 (L) 05/03/2024    HCT 39.2 07/22/2024    HCT 37.5 05/03/2024    HCT 37.0 05/03/2024    MCV 88 07/22/2024    MCV 82 05/03/2024    MCV 82 05/03/2024    MCH 26.0 07/22/2024    MCH 24.5 (L) 05/03/2024    MCH 23.8 (L) 05/03/2024    MCHC 29.6 (L) 07/22/2024    MCHC 29.9 (L) 05/03/2024    MCHC 28.9 (L) 05/03/2024    RDW 17.9 (H) 07/22/2024    RDW 19.1 (H) 05/03/2024    RDW 19.6 (H) 05/03/2024     07/22/2024     05/03/2024     05/03/2024       Cardiac Enzymes:    Lab Results   Component Value Date    TROPHS 4 09/25/2022    TROPHS 5 09/25/2022     TROPHS 5 09/25/2022       Hepatic Function Panel:    Lab Results   Component Value Date    ALKPHOS 75 07/22/2024    ALT 17 07/22/2024    AST 18 07/22/2024    PROT 6.2 (L) 07/22/2024    BILITOT 0.4 07/22/2024         REVIEW OF SYSTEMS  Review of Systems   Constitutional: Negative.   Cardiovascular: Negative.    Respiratory: Negative.     Neurological: Negative.    All other systems reviewed and are negative.      VITALS  Vitals:    08/27/24 1205   BP: 140/90   Pulse: 76     Wt Readings from Last 4 Encounters:   08/27/24 96.2 kg (212 lb)   07/31/24 98 kg (216 lb)   05/21/24 98.7 kg (217 lb 9.6 oz)   05/10/24 99.2 kg (218 lb 11.1 oz)       PHYSICAL EXAM  Constitutional:       Appearance: Healthy appearance. Not in distress.   Eyes:      Conjunctiva/sclera: Conjunctivae normal.      Pupils: Pupils are equal, round, and reactive to light.   Neck:      Vascular: No JVR. JVD normal.   Pulmonary:      Effort: Pulmonary effort is normal.      Breath sounds: Normal breath sounds. No wheezing. No rhonchi. No rales.   Chest:      Chest wall: Not tender to palpatation.   Cardiovascular:      PMI at left midclavicular line. Normal rate. Regular rhythm. Normal S1. Normal S2.       Murmurs: There is no murmur.      No gallop.  No click. No rub.   Pulses:     Intact distal pulses.   Edema:     Peripheral edema absent.   Abdominal:      Tenderness: There is no abdominal tenderness.   Musculoskeletal: Normal range of motion.         General: No tenderness.      Cervical back: Normal range of motion. Skin:     General: Skin is warm and dry.   Neurological:      General: No focal deficit present.      Mental Status: Alert and oriented to person, place and time.

## 2024-08-27 NOTE — PATIENT INSTRUCTIONS
Continue same medications and treatments.   Patient educated on proper medication use.   Patient educated on risk factor modification.   Please bring any lab results from other providers / physicians to your next appointment.     Please bring all medicines, vitamins, and herbal supplements with you when you come to the office.     Prescriptions will not be filled unless you are compliant with your follow up appointments or have a follow up appointment scheduled as per instruction of your physician. Refills should be requested at the time of your visit.  START Losartan 25 mg one a day  3 month visit

## 2024-09-11 ENCOUNTER — HOSPITAL ENCOUNTER (OUTPATIENT)
Dept: RADIOLOGY | Facility: HOSPITAL | Age: 69
Discharge: HOME | End: 2024-09-11
Payer: COMMERCIAL

## 2024-09-11 DIAGNOSIS — M48.062 SPINAL STENOSIS OF LUMBAR REGION WITH NEUROGENIC CLAUDICATION: ICD-10-CM

## 2024-09-11 DIAGNOSIS — M47.16 LUMBAR SPONDYLOSIS WITH MYELOPATHY: ICD-10-CM

## 2024-09-11 DIAGNOSIS — R29.898 BILATERAL LEG WEAKNESS: ICD-10-CM

## 2024-09-11 PROCEDURE — 72148 MRI LUMBAR SPINE W/O DYE: CPT

## 2024-09-11 PROCEDURE — 72148 MRI LUMBAR SPINE W/O DYE: CPT | Performed by: RADIOLOGY

## 2024-10-19 ENCOUNTER — HOSPITAL ENCOUNTER (EMERGENCY)
Facility: HOSPITAL | Age: 69
Discharge: HOME | End: 2024-10-19
Payer: COMMERCIAL

## 2024-10-19 VITALS — BODY MASS INDEX: 30.31 KG/M2 | HEIGHT: 68 IN | WEIGHT: 200 LBS

## 2024-10-19 DIAGNOSIS — S70.369A INSECT BITE OF THIGH, UNSPECIFIED LATERALITY, INITIAL ENCOUNTER: Primary | ICD-10-CM
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
## 2024-10-19 PROCEDURE — 2500000001 HC RX 250 WO HCPCS SELF ADMINISTERED DRUGS (ALT 637 FOR MEDICARE OP): Performed by: REGISTERED NURSE

## 2024-10-19 PROCEDURE — 99282 EMERGENCY DEPT VISIT SF MDM: CPT

## 2024-10-19 RX ORDER — DIPHENHYDRAMINE HCL 25 MG
25 TABLET ORAL EVERY 6 HOURS
Qty: 20 TABLET | Refills: 0 | Status: SHIPPED | OUTPATIENT
Start: 2024-10-19 | End: 2024-10-19

## 2024-10-19 RX ORDER — DIPHENHYDRAMINE HCL 25 MG
25 TABLET ORAL EVERY 6 HOURS
Qty: 20 TABLET | Refills: 0 | Status: SHIPPED | OUTPATIENT
Start: 2024-10-19 | End: 2024-10-24

## 2024-10-19 RX ORDER — FAMOTIDINE 20 MG/1
20 TABLET, FILM COATED ORAL ONCE
Status: COMPLETED | OUTPATIENT
Start: 2024-10-19 | End: 2024-10-19

## 2024-10-19 RX ORDER — FAMOTIDINE 20 MG/1
20 TABLET, FILM COATED ORAL 2 TIMES DAILY
Qty: 10 TABLET | Refills: 0 | Status: SHIPPED | OUTPATIENT
Start: 2024-10-19 | End: 2024-10-19

## 2024-10-19 RX ORDER — FAMOTIDINE 20 MG/1
20 TABLET, FILM COATED ORAL 2 TIMES DAILY
Qty: 10 TABLET | Refills: 0 | Status: SHIPPED | OUTPATIENT
Start: 2024-10-19 | End: 2024-10-24

## 2024-10-19 RX ADMIN — FAMOTIDINE 20 MG: 20 TABLET, FILM COATED ORAL at 11:47

## 2024-10-19 ASSESSMENT — LIFESTYLE VARIABLES
EVER HAD A DRINK FIRST THING IN THE MORNING TO STEADY YOUR NERVES TO GET RID OF A HANGOVER: NO
HAVE PEOPLE ANNOYED YOU BY CRITICIZING YOUR DRINKING: NO
TOTAL SCORE: 0
HAVE YOU EVER FELT YOU SHOULD CUT DOWN ON YOUR DRINKING: NO
EVER FELT BAD OR GUILTY ABOUT YOUR DRINKING: NO

## 2024-10-19 NOTE — ED PROVIDER NOTES
HPI   Chief Complaint   Patient presents with    Rash     Pt has bites that are itching and thinks it may be from bedbugs      69-year-old female presents emergency department today for evaluation of bug bites to right inner thigh.  Patient tells me that she noticed them last night and that they are extremely itchy.  She tells me that they are located to her right upper thigh and her left thigh.  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  Patient was decontaminated after arrival and no bedbugs were seen.      History provided by:  Patient   used: No            Patient History   Past Medical History:   Diagnosis Date    Personal history of other diseases of the circulatory system 06/30/2021    History of hypertension    Personal history of other diseases of the digestive system 03/08/2017    History of gastrointestinal hemorrhage    Personal history of other diseases of the digestive system 03/08/2017    History of small bowel obstruction    Personal history of other specified conditions 06/30/2021    History of chest pain    Unspecified atrial fibrillation (Multi) 11/25/2019    Atrial fibrillation with RVR     Past Surgical History:   Procedure Laterality Date    APPENDECTOMY  03/08/2017    Appendectomy    CARDIAC CATHETERIZATION N/A 5/10/2024    Procedure: Left Heart Cath, With LV;  Surgeon: Israel Richardson MD;  Location: [facility];  Service: Cardiovascular;  Laterality: N/A;  possible PCI    CT ABDOMEN PELVIS ANGIOGRAM W AND/OR WO IV CONTRAST  9/7/2022    CT ABDOMEN PELVIS ANGIOGRAM W AND/OR WO IV CONTRAST 9/7/2022 [facility]ICAL [facility]    HYSTERECTOMY  03/08/2017    Hysterectomy    OTHER SURGICAL HISTORY  07/21/2022    Colonoscopy    OTHER SURGICAL HISTORY  01/23/2021    Hip replacement    OTHER SURGICAL HISTORY  12/22/2022    Atrial appendage closure device insertion    OTHER SURGICAL HISTORY  03/15/2021     Renal lithotripsy    OTHER SURGICAL HISTORY  01/27/2022    Colonic polypectomy    OTHER SURGICAL HISTORY  01/27/2022    Urethral dilation    OTHER SURGICAL HISTORY  12/27/2021    Bladder surgery    OTHER SURGICAL HISTORY  12/27/2021    Complete colonoscopy    OTHER SURGICAL HISTORY  12/27/2021    Tubal ligation    OTHER SURGICAL HISTORY  12/27/2021    Tooth extraction    OTHER SURGICAL HISTORY  12/27/2021    [place] tooth extraction    OTHER SURGICAL HISTORY  12/27/2021    Esophagogastroduodenoscopy    ROTATOR CUFF REPAIR  03/08/2017    Rotator Cuff Repair     Family History   Problem Relation Name Age of Onset    Hypertension Mother      Diabetes Mother      Other (cabg) Mother      Other (CARDIAC DISORDER) Mother      Other (CARDIAC PACEMAKER) Mother      Other (CEREBROVASCULAR ACCIDENT) Mother      Heart attack Mother      Kidney disease Father      Hypertension Father      Diabetes Father      Other (CARDIAC DISORDER) Father      Other (CEREBROVASCULAR ACCIDENT) Father       Social History     Tobacco Use    Smoking status: Never     Passive exposure: Never    Smokeless tobacco: Never   Vaping Use    Vaping status: Never Used   Substance Use Topics    Alcohol use: Yes     Comment: 3-4x a year    Drug use: Never       Physical Exam   ED Triage Vitals   Temp Pulse Resp BP   -- -- -- --      SpO2 Temp src Heart Rate Source Patient Position   -- -- -- --      BP Location FiO2 (%)     -- --       Physical Exam  Vitals and nursing note reviewed.   Constitutional:       Appearance: Normal appearance.   HENT:      Head: Normocephalic and atraumatic.      Right Ear: Tympanic membrane normal.      Left Ear: Tympanic membrane normal.   Cardiovascular:      Rate and Rhythm: Normal rate.      Pulses: Normal pulses.   Pulmonary:      Effort: Pulmonary effort is normal.      Breath sounds: Normal breath sounds.   Abdominal:      General: Abdomen is flat.      Palpations: Abdomen is soft.   Musculoskeletal:         General:  Normal range of motion.   Skin:     General: Skin is warm and dry.      Capillary Refill: Capillary refill takes less than 2 seconds.      Comments: Seven 1.5 cm circular wound bites to right inner thigh 2 to left inner thigh bites do hugo with pressure   Neurological:      General: No focal deficit present.      Mental Status: She is alert and oriented to person, place, and time.   Psychiatric:         Mood and Affect: Mood normal.         Behavior: Behavior normal.           ED Course & MDM   Diagnoses as of 10/19/24 1206   Insect bite of thigh, unspecified laterality, initial encounter                 No data recorded     [place] Coma Scale Score: 15 (10/19/24 1105 : Elsa Alvares RN)                           Medical Decision Making    Patient seen examined emergency department; patient is healthy nontoxic in appearance and does not appear in acute distress.  Patient has large red welts approximately 7 to the right inner thigh and 2 to the left inner thigh.  These bites are not consistent with typical bedbug bites.  Skin is intact with signs and symptoms of infection.  Patient I discussed that this does not look like bedbugs and appears to be similar type of insect.  She tells me she is not taking any medication to treat her itching.  Will be given a dose of Pepcid while in the emergency department as she is driving today.  Will then discharge with prescription for Benadryl and Pepcid.  Patient directed to take Pepcid for 5 days.  Patient given return parameters which she verbalized understanding of.  All patient's questions and concerns were addressed prior to discharge.  Patient to return in stable condition.    Procedure  Procedures     Daisy Harden, EDISON-CNP  10/19/24 1210

## 2024-10-21 DIAGNOSIS — K21.9 GASTRO-ESOPHAGEAL REFLUX DISEASE WITHOUT ESOPHAGITIS: ICD-10-CM

## 2024-10-21 RX ORDER — OMEPRAZOLE 40 MG/1
40 CAPSULE, DELAYED RELEASE ORAL DAILY
Qty: 30 CAPSULE | Refills: 5 | Status: SHIPPED | OUTPATIENT
Start: 2024-10-21

## 2024-11-01 ENCOUNTER — LAB (OUTPATIENT)
Facility: LAB | Age: 69
End: 2024-11-01
Payer: COMMERCIAL

## 2024-11-01 DIAGNOSIS — E78.2 MIXED HYPERLIPIDEMIA: ICD-10-CM

## 2024-11-01 DIAGNOSIS — I10 ESSENTIAL HYPERTENSION: ICD-10-CM

## 2024-11-01 DIAGNOSIS — R53.82 CHRONIC FATIGUE: ICD-10-CM

## 2024-11-01 DIAGNOSIS — D64.9 CHRONIC ANEMIA: ICD-10-CM

## 2024-11-01 DIAGNOSIS — E11.9 TYPE 2 DIABETES MELLITUS WITHOUT COMPLICATION, WITHOUT LONG-TERM CURRENT USE OF INSULIN (MULTI): ICD-10-CM

## 2024-11-01 LAB
ALBUMIN SERPL BCP-MCNC: 4.2 G/DL (ref 3.4–5)
ALP SERPL-CCNC: 84 U/L (ref 33–136)
ALT SERPL W P-5'-P-CCNC: 14 U/L (ref 7–45)
ANION GAP SERPL CALC-SCNC: 11 MMOL/L (ref 10–20)
AST SERPL W P-5'-P-CCNC: 16 U/L (ref 9–39)
BASOPHILS # BLD AUTO: 0.04 X10*3/UL (ref 0–0.1)
BASOPHILS NFR BLD AUTO: 0.7 %
BILIRUB SERPL-MCNC: 0.4 MG/DL (ref 0–1.2)
BUN SERPL-MCNC: 13 MG/DL (ref 6–23)
CALCIUM SERPL-MCNC: 9.4 MG/DL (ref 8.6–10.3)
CHLORIDE SERPL-SCNC: 105 MMOL/L (ref 98–107)
CHOLEST SERPL-MCNC: 149 MG/DL (ref 0–199)
CHOLESTEROL/HDL RATIO: 3.2
CO2 SERPL-SCNC: 29 MMOL/L (ref 21–32)
CREAT SERPL-MCNC: 0.84 MG/DL (ref 0.5–1.05)
EGFRCR SERPLBLD CKD-EPI 2021: 76 ML/MIN/1.73M*2
EOSINOPHIL # BLD AUTO: 0.12 X10*3/UL (ref 0–0.7)
EOSINOPHIL NFR BLD AUTO: 2 %
ERYTHROCYTE [DISTWIDTH] IN BLOOD BY AUTOMATED COUNT: 16 % (ref 11.5–14.5)
GLUCOSE SERPL-MCNC: 104 MG/DL (ref 74–99)
HCT VFR BLD AUTO: 43.4 % (ref 36–46)
HDLC SERPL-MCNC: 47 MG/DL
HGB BLD-MCNC: 13.1 G/DL (ref 12–16)
IMM GRANULOCYTES # BLD AUTO: 0.02 X10*3/UL (ref 0–0.7)
IMM GRANULOCYTES NFR BLD AUTO: 0.3 % (ref 0–0.9)
LDLC SERPL CALC-MCNC: 74 MG/DL
LYMPHOCYTES # BLD AUTO: 2.69 X10*3/UL (ref 1.2–4.8)
LYMPHOCYTES NFR BLD AUTO: 45.4 %
MCH RBC QN AUTO: 26.7 PG (ref 26–34)
MCHC RBC AUTO-ENTMCNC: 30.2 G/DL (ref 32–36)
MCV RBC AUTO: 89 FL (ref 80–100)
MONOCYTES # BLD AUTO: 0.44 X10*3/UL (ref 0.1–1)
MONOCYTES NFR BLD AUTO: 7.4 %
NEUTROPHILS # BLD AUTO: 2.62 X10*3/UL (ref 1.2–7.7)
NEUTROPHILS NFR BLD AUTO: 44.2 %
NON HDL CHOLESTEROL: 102 MG/DL (ref 0–149)
NRBC BLD-RTO: 0 /100 WBCS (ref 0–0)
PLATELET # BLD AUTO: 303 X10*3/UL (ref 150–450)
POTASSIUM SERPL-SCNC: 4.2 MMOL/L (ref 3.5–5.3)
PROT SERPL-MCNC: 6.7 G/DL (ref 6.4–8.2)
RBC # BLD AUTO: 4.9 X10*6/UL (ref 4–5.2)
SODIUM SERPL-SCNC: 141 MMOL/L (ref 136–145)
TRIGL SERPL-MCNC: 142 MG/DL (ref 0–149)
VLDL: 28 MG/DL (ref 0–40)
WBC # BLD AUTO: 5.9 X10*3/UL (ref 4.4–11.3)

## 2024-11-01 PROCEDURE — 36415 COLL VENOUS BLD VENIPUNCTURE: CPT

## 2024-11-01 PROCEDURE — 83036 HEMOGLOBIN GLYCOSYLATED A1C: CPT

## 2024-11-01 PROCEDURE — 80061 LIPID PANEL: CPT

## 2024-11-01 PROCEDURE — 85025 COMPLETE CBC W/AUTO DIFF WBC: CPT

## 2024-11-01 PROCEDURE — 80053 COMPREHEN METABOLIC PANEL: CPT

## 2024-11-02 LAB
EST. AVERAGE GLUCOSE BLD GHB EST-MCNC: 143 MG/DL
HBA1C MFR BLD: 6.6 %

## 2024-11-07 ENCOUNTER — APPOINTMENT (OUTPATIENT)
Dept: PRIMARY CARE | Facility: CLINIC | Age: 69
End: 2024-11-07
Payer: COMMERCIAL

## 2024-11-07 VITALS
TEMPERATURE: 98.7 F | BODY MASS INDEX: 31.07 KG/M2 | WEIGHT: 205 LBS | HEIGHT: 68 IN | RESPIRATION RATE: 16 BRPM | DIASTOLIC BLOOD PRESSURE: 84 MMHG | OXYGEN SATURATION: 99 % | SYSTOLIC BLOOD PRESSURE: 122 MMHG | HEART RATE: 69 BPM

## 2024-11-07 DIAGNOSIS — D64.9 CHRONIC ANEMIA: ICD-10-CM

## 2024-11-07 DIAGNOSIS — Z00.00 ANNUAL PHYSICAL EXAM: Primary | ICD-10-CM

## 2024-11-07 DIAGNOSIS — I10 ESSENTIAL HYPERTENSION: ICD-10-CM

## 2024-11-07 DIAGNOSIS — M47.16 LUMBAR SPONDYLOSIS WITH MYELOPATHY: ICD-10-CM

## 2024-11-07 DIAGNOSIS — E11.9 TYPE 2 DIABETES MELLITUS WITHOUT COMPLICATION, WITHOUT LONG-TERM CURRENT USE OF INSULIN (MULTI): ICD-10-CM

## 2024-11-07 DIAGNOSIS — E78.2 MIXED HYPERLIPIDEMIA: ICD-10-CM

## 2024-11-07 PROCEDURE — 99397 PER PM REEVAL EST PAT 65+ YR: CPT | Performed by: FAMILY MEDICINE

## 2024-11-07 PROCEDURE — 4010F ACE/ARB THERAPY RXD/TAKEN: CPT | Performed by: FAMILY MEDICINE

## 2024-11-07 PROCEDURE — 3044F HG A1C LEVEL LT 7.0%: CPT | Performed by: FAMILY MEDICINE

## 2024-11-07 PROCEDURE — 1159F MED LIST DOCD IN RCRD: CPT | Performed by: FAMILY MEDICINE

## 2024-11-07 PROCEDURE — 1036F TOBACCO NON-USER: CPT | Performed by: FAMILY MEDICINE

## 2024-11-07 PROCEDURE — 99214 OFFICE O/P EST MOD 30 MIN: CPT | Performed by: FAMILY MEDICINE

## 2024-11-07 PROCEDURE — 3048F LDL-C <100 MG/DL: CPT | Performed by: FAMILY MEDICINE

## 2024-11-07 PROCEDURE — 3061F NEG MICROALBUMINURIA REV: CPT | Performed by: FAMILY MEDICINE

## 2024-11-07 PROCEDURE — 1160F RVW MEDS BY RX/DR IN RCRD: CPT | Performed by: FAMILY MEDICINE

## 2024-11-07 PROCEDURE — 1158F ADVNC CARE PLAN TLK DOCD: CPT | Performed by: FAMILY MEDICINE

## 2024-11-07 PROCEDURE — 3079F DIAST BP 80-89 MM HG: CPT | Performed by: FAMILY MEDICINE

## 2024-11-07 PROCEDURE — 3008F BODY MASS INDEX DOCD: CPT | Performed by: FAMILY MEDICINE

## 2024-11-07 PROCEDURE — 3074F SYST BP LT 130 MM HG: CPT | Performed by: FAMILY MEDICINE

## 2024-11-07 PROCEDURE — 1123F ACP DISCUSS/DSCN MKR DOCD: CPT | Performed by: FAMILY MEDICINE

## 2024-11-07 RX ORDER — ROSUVASTATIN CALCIUM 5 MG/1
5 TABLET, COATED ORAL DAILY
Qty: 30 TABLET | Refills: 3 | Status: SHIPPED | OUTPATIENT
Start: 2024-11-07 | End: 2025-03-07

## 2024-11-07 RX ORDER — LINAGLIPTIN 5 MG/1
5 TABLET, FILM COATED ORAL DAILY
Qty: 30 TABLET | Refills: 3 | Status: SHIPPED | OUTPATIENT
Start: 2024-11-07

## 2024-11-07 RX ORDER — GABAPENTIN 300 MG/1
CAPSULE ORAL
Qty: 60 CAPSULE | Refills: 0 | Status: SHIPPED | OUTPATIENT
Start: 2024-11-07

## 2024-11-07 ASSESSMENT — PATIENT HEALTH QUESTIONNAIRE - PHQ9
SUM OF ALL RESPONSES TO PHQ9 QUESTIONS 1 AND 2: 0
2. FEELING DOWN, DEPRESSED OR HOPELESS: NOT AT ALL
1. LITTLE INTEREST OR PLEASURE IN DOING THINGS: NOT AT ALL

## 2024-11-07 ASSESSMENT — ENCOUNTER SYMPTOMS
FATIGUE: 1
PALPITATIONS: 0
RHINORRHEA: 0
WEIGHT LOSS: 0
VISUAL CHANGE: 0
BLURRED VISION: 0
DYSURIA: 0
TREMORS: 0
CHILLS: 0
FREQUENCY: 0
SHORTNESS OF BREATH: 0
POLYDIPSIA: 0
FEVER: 0
HEADACHES: 0
COUGH: 0
BACK PAIN: 1
NUMBNESS: 0
WHEEZING: 0
VOMITING: 0
DIARRHEA: 0
WEAKNESS: 0
CONSTIPATION: 0
POLYPHAGIA: 0
SORE THROAT: 0
ABDOMINAL PAIN: 0
DIZZINESS: 0
HEMATURIA: 0

## 2024-11-07 NOTE — PROGRESS NOTES
Subjective   Patient ID: Bella Leone is a 69 y.o. female who presents for Follow-up (Diabetes, Hypertension, Hyperlipidemia, Chronic Anemia and labs).    Patient presents today for annual physical exam and follow-up on hypertension and hyperlipidemia. She has no chest pain, dyspnea, exertional chest pressure/discomfort, near-syncope, orthopnea, palpitations, paroxysmal nocturnal dyspnea, and syncope. Taking her medication regularly with no side effects.    Diabetes  She presents for her follow-up diabetic visit. She has type 2 diabetes mellitus. Her disease course has been stable. There are no hypoglycemic associated symptoms. Pertinent negatives for hypoglycemia include no dizziness, headaches or tremors. Associated symptoms include fatigue. Pertinent negatives for diabetes include no blurred vision, no chest pain, no foot paresthesias, no foot ulcerations, no polydipsia, no polyphagia, no polyuria, no visual change, no weakness and no weight loss. Risk factors for coronary artery disease include diabetes mellitus, dyslipidemia, hypertension and obesity.   Back Pain  This is a recurrent problem. The current episode started 1 to 4 weeks ago. The problem occurs constantly. The problem has been gradually worsening since onset. The pain is present in the lumbar spine. Radiates to: both legs. The pain is at a severity of 7/10. The pain is severe. The symptoms are aggravated by lying down, stress, twisting, standing, sitting, position and bending. Pertinent negatives include no abdominal pain, chest pain, dysuria, fever, headaches, numbness, weakness or weight loss. She has tried NSAIDs for the symptoms.        Review of Systems   Constitutional:  Positive for fatigue. Negative for chills, fever and weight loss.   HENT:  Negative for congestion, ear pain, nosebleeds, rhinorrhea and sore throat.    Eyes:  Negative for blurred vision.   Respiratory:  Negative for cough, shortness of breath and wheezing.   "  Cardiovascular:  Negative for chest pain, palpitations and leg swelling.   Gastrointestinal:  Negative for abdominal pain, constipation, diarrhea and vomiting.   Endocrine: Negative for polydipsia, polyphagia and polyuria.   Genitourinary:  Negative for dysuria, frequency and hematuria.   Musculoskeletal:  Positive for back pain.   Neurological:  Negative for dizziness, tremors, weakness, numbness and headaches.       Objective   /84   Pulse 69   Temp 37.1 °C (98.7 °F)   Resp 16   Ht 1.727 m (5' 8\")   Wt 93 kg (205 lb)   SpO2 99%   BMI 31.17 kg/m²     Physical Exam  Constitutional:       General: She is not in acute distress.     Appearance: Normal appearance.   HENT:      Head: Normocephalic and atraumatic.      Mouth/Throat:      Mouth: Mucous membranes are moist.      Pharynx: Oropharynx is clear. No oropharyngeal exudate or posterior oropharyngeal erythema.   Eyes:      General: No scleral icterus.     Extraocular Movements: Extraocular movements intact.      Pupils: Pupils are equal, round, and reactive to light.   Cardiovascular:      Rate and Rhythm: Normal rate and regular rhythm.      Pulses: Normal pulses.      Heart sounds: No murmur heard.     No friction rub. No gallop.   Pulmonary:      Effort: Pulmonary effort is normal.      Breath sounds: No wheezing, rhonchi or rales.   Skin:     General: Skin is warm.      Coloration: Skin is not jaundiced or pale.      Findings: No erythema or rash.   Neurological:      General: No focal deficit present.      Mental Status: She is alert and oriented to person, place, and time.      Cranial Nerves: No cranial nerve deficit.      Sensory: No sensory deficit.      Coordination: Coordination normal.      Gait: Gait normal.         Lab on 11/01/2024   Component Date Value Ref Range Status    Glucose 11/01/2024 104 (H)  74 - 99 mg/dL Final    Sodium 11/01/2024 141  136 - 145 mmol/L Final    Potassium 11/01/2024 4.2  3.5 - 5.3 mmol/L Final    Chloride " 11/01/2024 105  98 - 107 mmol/L Final    Bicarbonate 11/01/2024 29  21 - 32 mmol/L Final    Anion Gap 11/01/2024 11  10 - 20 mmol/L Final    Urea Nitrogen 11/01/2024 13  6 - 23 mg/dL Final    Creatinine 11/01/2024 0.84  0.50 - 1.05 mg/dL Final    eGFR 11/01/2024 76  >60 mL/min/1.73m*2 Final    Calculations of estimated GFR are performed using the 2021 CKD-EPI Study Refit equation without the race variable for the IDMS-Traceable creatinine methods.  https://jasn.asnjournals.org/content/early/2021/09/22/ASN.0524494802    Calcium 11/01/2024 9.4  8.6 - 10.3 mg/dL Final    Albumin 11/01/2024 4.2  3.4 - 5.0 g/dL Final    Alkaline Phosphatase 11/01/2024 84  33 - 136 U/L Final    Total Protein 11/01/2024 6.7  6.4 - 8.2 g/dL Final    AST 11/01/2024 16  9 - 39 U/L Final    Bilirubin, Total 11/01/2024 0.4  0.0 - 1.2 mg/dL Final    ALT 11/01/2024 14  7 - 45 U/L Final    Patients treated with Sulfasalazine may generate falsely decreased results for ALT.    WBC 11/01/2024 5.9  4.4 - 11.3 x10*3/uL Final    nRBC 11/01/2024 0.0  0.0 - 0.0 /100 WBCs Final    RBC 11/01/2024 4.90  4.00 - 5.20 x10*6/uL Final    Hemoglobin 11/01/2024 13.1  12.0 - 16.0 g/dL Final    Hematocrit 11/01/2024 43.4  36.0 - 46.0 % Final    MCV 11/01/2024 89  80 - 100 fL Final    MCH 11/01/2024 26.7  26.0 - 34.0 pg Final    MCHC 11/01/2024 30.2 (L)  32.0 - 36.0 g/dL Final    RDW 11/01/2024 16.0 (H)  11.5 - 14.5 % Final    Platelets 11/01/2024 303  150 - 450 x10*3/uL Final    Neutrophils % 11/01/2024 44.2  40.0 - 80.0 % Final    Immature Granulocytes %, Automated 11/01/2024 0.3  0.0 - 0.9 % Final    Immature Granulocyte Count (IG) includes promyelocytes, myelocytes and metamyelocytes but does not include bands. Percent differential counts (%) should be interpreted in the context of the absolute cell counts (cells/UL).    Lymphocytes % 11/01/2024 45.4  13.0 - 44.0 % Final    Monocytes % 11/01/2024 7.4  2.0 - 10.0 % Final    Eosinophils % 11/01/2024 2.0  0.0 - 6.0 %  Final    Basophils % 11/01/2024 0.7  0.0 - 2.0 % Final    Neutrophils Absolute 11/01/2024 2.62  1.20 - 7.70 x10*3/uL Final    Percent differential counts (%) should be interpreted in the context of the absolute cell counts (cells/uL).    Immature Granulocytes Absolute, Au* 11/01/2024 0.02  0.00 - 0.70 x10*3/uL Final    Lymphocytes Absolute 11/01/2024 2.69  1.20 - 4.80 x10*3/uL Final    Monocytes Absolute 11/01/2024 0.44  0.10 - 1.00 x10*3/uL Final    Eosinophils Absolute 11/01/2024 0.12  0.00 - 0.70 x10*3/uL Final    Basophils Absolute 11/01/2024 0.04  0.00 - 0.10 x10*3/uL Final    Cholesterol 11/01/2024 149  0 - 199 mg/dL Final          Age      Desirable   Borderline High   High     0-19 Y     0 - 169       170 - 199     >/= 200    20-24 Y     0 - 189       190 - 224     >/= 225         >24 Y     0 - 199       200 - 239     >/= 240   **All ranges are based on fasting samples. Specific   therapeutic targets will vary based on patient-specific   cardiac risk.    Pediatric guidelines reference:Pediatrics 2011, 128(S5).Adult guidelines reference: NCEP ATPIII Guidelines,FELA 2001, 258:2486-97    Venipuncture immediately after or during the administration of Metamizole may lead to falsely low results. Testing should be performed immediately prior to Metamizole dosing.    HDL-Cholesterol 11/01/2024 47.0  mg/dL Final      Age       Very Low   Low     Normal    High    0-19 Y    < 35      < 40     40-45     ----  20-24 Y    ----     < 40      >45      ----        >24 Y      ----     < 40     40-60      >60      Cholesterol/HDL Ratio 11/01/2024 3.2   Final      Ref Values  Desirable  < 3.4  High Risk  > 5.0    LDL Calculated 11/01/2024 74  <=99 mg/dL Final                                Near   Borderline      AGE      Desirable  Optimal    High     High     Very High     0-19 Y     0 - 109     ---    110-129   >/= 130     ----    20-24 Y     0 - 119     ---    120-159   >/= 160     ----      >24 Y     0 -  99   100-129   130-159   160-189     >/=190      VLDL 11/01/2024 28  0 - 40 mg/dL Final    Triglycerides 11/01/2024 142  0 - 149 mg/dL Final    Age              Desirable        Borderline         High        Very High  SEX:B           mg/dL             mg/dL               mg/dL      mg/dL  <=14D                       ----               ----        ----  15D-365D                    ----               ----        ----  1Y-9Y           0-74               75-99             >=100       ----  10Y-19Y        0-89                            >=130       ----  20Y-24Y        0-114             115-149             >=150      ----  >= 25Y         0-149             150-199             200-499    >=500      Venipuncture immediately after or during the administration of Metamizole may lead to falsely low results. Testing should be performed immediately prior to Metamizole dosing.    Non HDL Cholesterol 11/01/2024 102  0 - 149 mg/dL Final          Age       Desirable   Borderline High   High     Very High     0-19 Y     0 - 119       120 - 144     >/= 145    >/= 160    20-24 Y     0 - 149       150 - 189     >/= 190      ----         >24 Y    30 mg/dL above LDL Cholesterol goal      Hemoglobin A1C 11/01/2024 6.6 (H)  See comment % Final    Estimated Average Glucose 11/01/2024 143  Not Established mg/dL Final     Assessment/Plan   Problem List Items Addressed This Visit       Annual physical exam - Primary     Recommend low-cholesterol diet, low-fat diet and low-salt diet.  The need for lifelong dietary compliance in order to reduce cardiac risk is recommended.  We will also recommend regular exercise program to improve lipid balance and overall health.  Recommend decreasing fat and cholesterol in diet, increasing aerobic exercise with a goal of 4 or more days per week         Chronic anemia     Stable, continue current medications and management.         Relevant Orders    CBC and Auto Differential    Follow Up In [facility] - PCP - Established    Essential hypertension     Well-controlled, continue current medications and management.         Relevant Orders    Comprehensive Metabolic Panel    Hemoglobin A1C    Lipid Panel    Albumin-Creatinine Ratio, Urine Random    Follow Up In [facility] - PCP - Established    Lumbar spondylosis with myelopathy     We will start her on Gabapentin and Refer her to Pain Management for further evaluation.         Relevant Medications    gabapentin (Neurontin) 300 mg capsule    Other Relevant Orders    Referral to Pain Medicine    Follow Up In [facility] - PCP - Established    Mixed hyperlipidemia     The nature of cardiac risk has been fully discussed with this patient. Discussed cardiovascular risk analysis and appropriate diet with the need for lifelong measures to reduce the risk. A regular exercise program is recommended to help achieve and maintain normal body weight, fitness and improve lipid balance. Patient education provided. They understand and agree with this course of treatment. They will return with new or worsening symptoms. Patient instructed to remain current with appropriate annual health maintenance.          Relevant Medications    rosuvastatin (Crestor) 5 mg tablet    Other Relevant Orders    Comprehensive Metabolic Panel    Hemoglobin A1C    Lipid Panel    Albumin-Creatinine Ratio, Urine Random    Follow Up In [facility] - PCP - Established    Type 2 diabetes mellitus without complication, without long-term current use of insulin (Multi)     Diabetes Mellitus type II, under good control.  1. Rx changes: None  2. Education: Reviewed ‘ABCs’ of diabetes management (respective goals in parentheses):  A1C (<7), blood pressure (<130/80), and cholesterol (LDL <100).  3. Compliance at present is estimated to be good. Efforts to improve compliance (if necessary) will be directed at dietary modifications: and increased exercise.  4. Follow up: 3  months         Relevant Medications    linaGLIPtin (Tradjenta) 5 mg tablet    Other Relevant Orders    Comprehensive Metabolic Panel    Hemoglobin A1C    Lipid Panel    Albumin-Creatinine Ratio, Urine Random    Follow Up In [facility] - PCP - Established     Scribe Attestation  By signing my name below, I, Maria Del Rosario Juan   attest that this documentation has been prepared under the direction and in the presence of Hany Leo MD.

## 2024-11-15 ENCOUNTER — TELEPHONE (OUTPATIENT)
Dept: CARDIOLOGY | Facility: HOSPITAL | Age: 69
End: 2024-11-15
Payer: COMMERCIAL

## 2024-11-15 NOTE — PROGRESS NOTES
Returned patient call.  Patient did not actually fall.  She states was arranging a skirt around her [employer] tree.  Pain occurred with moving to stand.  Patient is s/p Watchman device 11/2022.  Discussed with ANDREW.  Very low risk of any issue with device.  Patient suspects she pulled a muscle. Patient instructed to use heat and tylenol for localized soreness.

## 2024-11-19 ENCOUNTER — APPOINTMENT (OUTPATIENT)
Dept: CARDIOLOGY | Facility: CLINIC | Age: 69
End: 2024-11-19
Payer: COMMERCIAL

## 2024-12-12 ENCOUNTER — TELEPHONE (OUTPATIENT)
Dept: PRIMARY CARE | Facility: CLINIC | Age: 69
End: 2024-12-12

## 2024-12-12 NOTE — TELEPHONE ENCOUNTER
YAHIR CALLED IN BECAUSE HER INSURANCE WILL COVER A RECLINER LIFT CHAIR FOR HER, SHE IS REQUESTING AP PLACE AN ORDER FOR HER TO RECEIVE ONE. PLEASE ADVISE?

## 2024-12-31 ENCOUNTER — APPOINTMENT (OUTPATIENT)
Dept: CARDIOLOGY | Facility: CLINIC | Age: 69
End: 2024-12-31
Payer: COMMERCIAL

## 2024-12-31 VITALS
BODY MASS INDEX: 31.67 KG/M2 | HEIGHT: 68 IN | HEART RATE: 74 BPM | SYSTOLIC BLOOD PRESSURE: 132 MMHG | WEIGHT: 209 LBS | DIASTOLIC BLOOD PRESSURE: 84 MMHG

## 2024-12-31 DIAGNOSIS — Z95.818 PRESENCE OF WATCHMAN LEFT ATRIAL APPENDAGE CLOSURE DEVICE: ICD-10-CM

## 2024-12-31 DIAGNOSIS — I10 ESSENTIAL HYPERTENSION: ICD-10-CM

## 2024-12-31 DIAGNOSIS — E78.2 MIXED HYPERLIPIDEMIA: ICD-10-CM

## 2024-12-31 DIAGNOSIS — I48.92 ATRIAL FLUTTER, UNSPECIFIED TYPE (MULTI): ICD-10-CM

## 2024-12-31 DIAGNOSIS — I48.0 PAROXYSMAL ATRIAL FIBRILLATION WITH RVR (MULTI): ICD-10-CM

## 2024-12-31 DIAGNOSIS — Z78.9 NEVER SMOKED ANY SUBSTANCE: ICD-10-CM

## 2024-12-31 DIAGNOSIS — I20.0 ANGINA PECTORIS, UNSTABLE (MULTI): ICD-10-CM

## 2024-12-31 PROCEDURE — 1123F ACP DISCUSS/DSCN MKR DOCD: CPT | Performed by: INTERNAL MEDICINE

## 2024-12-31 PROCEDURE — 1036F TOBACCO NON-USER: CPT | Performed by: INTERNAL MEDICINE

## 2024-12-31 PROCEDURE — 99214 OFFICE O/P EST MOD 30 MIN: CPT | Performed by: INTERNAL MEDICINE

## 2024-12-31 PROCEDURE — 4010F ACE/ARB THERAPY RXD/TAKEN: CPT | Performed by: INTERNAL MEDICINE

## 2024-12-31 PROCEDURE — 3079F DIAST BP 80-89 MM HG: CPT | Performed by: INTERNAL MEDICINE

## 2024-12-31 PROCEDURE — 3075F SYST BP GE 130 - 139MM HG: CPT | Performed by: INTERNAL MEDICINE

## 2024-12-31 PROCEDURE — 1159F MED LIST DOCD IN RCRD: CPT | Performed by: INTERNAL MEDICINE

## 2024-12-31 PROCEDURE — 3008F BODY MASS INDEX DOCD: CPT | Performed by: INTERNAL MEDICINE

## 2024-12-31 PROCEDURE — 3044F HG A1C LEVEL LT 7.0%: CPT | Performed by: INTERNAL MEDICINE

## 2024-12-31 PROCEDURE — 3061F NEG MICROALBUMINURIA REV: CPT | Performed by: INTERNAL MEDICINE

## 2024-12-31 PROCEDURE — 3048F LDL-C <100 MG/DL: CPT | Performed by: INTERNAL MEDICINE

## 2024-12-31 RX ORDER — METHOCARBAMOL 500 MG/1
TABLET, FILM COATED ORAL
COMMUNITY
Start: 2024-12-07

## 2024-12-31 ASSESSMENT — ENCOUNTER SYMPTOMS
SYNCOPE: 0
ORTHOPNEA: 0
NEAR-SYNCOPE: 0
WHEEZING: 0
COUGH: 0
CONSTITUTIONAL NEGATIVE: 1
PND: 0
SHORTNESS OF BREATH: 0
IRREGULAR HEARTBEAT: 0
RESPIRATORY NEGATIVE: 1
NEUROLOGICAL NEGATIVE: 1
CLAUDICATION: 0
SNORING: 0

## 2024-12-31 NOTE — PATIENT INSTRUCTIONS
Follow up in 6 months  Continue same medications and treatments.   Patient educated on proper medication use.   Patient educated on risk factor modification.   Please bring any lab results from other providers / physicians to your next appointment.     Please bring all medicines, vitamins, and herbal supplements with you when you come to the office.     Prescriptions will not be filled unless you are compliant with your follow up appointments or have a follow up appointment scheduled as per instruction of your physician. Refills should be requested at the time of your visit.  ILOTUS LPN, AM SCRIBING FOR, AND IN THE PRESENCE OF DR. MODESTO MD

## 2024-12-31 NOTE — PROGRESS NOTES
CARDIOLOGY OFFICE VISIT      CHIEF COMPLAINT  Chief Complaint   Patient presents with    Follow-up     Patient is present for 3 month follow up.           HISTORY OF PRESENT ILLNESS  Bella Leone is a 69 y.o. year old female patient with paroxysmal A-fib s/p Watchman device about a year ago, hypertension and atypical chest pain for which she had a stress test in 2022 that was normal.  However she complained of recurrent chest pain when she walks dog and shortness of breath for which she had a cardiac catheterization in June 2024 that showed normal coronaries.  She has continued to do well with no recurrent chest pain.      Recent labs from November 24 shows cholesterol is 149, HDL is 47, LDL is 74 and triglyceride is 142    ASSESSMENT AND PLAN  Hypertension: Blood pressure is well-controlled on current medications which we will continue.   Chest pain: This appears to be atypical chest pain with normal cardiac catheterization as noted above.    3.  Paroxysmal atrial fibrillation: Status post Watchman device implantation as noted above.  She continues with baby aspirin.    Problem List Items Addressed This Visit          Cardiac and Vasculature    Angina pectoris, unstable (Multi)    Atrial flutter (Multi)    Essential hypertension    Mixed hyperlipidemia    Paroxysmal atrial fibrillation with RVR (Multi)    Presence of Watchman left atrial appendage closure device       Endocrine/Metabolic    Body mass index (BMI) of 31.0 to 31.9 in adult       Tobacco    Never smoked any substance       Recent Cardiovascular Testing:    Echo-  Stress-  Cath-  Carotid Ultrasound-    Past Medical History  Past Medical History:   Diagnosis Date    Personal history of other diseases of the circulatory system 06/30/2021    History of hypertension    Personal history of other diseases of the digestive system 03/08/2017    History of gastrointestinal hemorrhage    Personal history of other diseases of the digestive system 03/08/2017     History of small bowel obstruction    Personal history of other specified conditions 06/30/2021    History of chest pain    Unspecified atrial fibrillation (Multi) 11/25/2019    Atrial fibrillation with RVR       Social History  Social History     Tobacco Use    Smoking status: Never     Passive exposure: Never    Smokeless tobacco: Never   Vaping Use    Vaping status: Never Used   Substance Use Topics    Alcohol use: Yes     Comment: 3-4x a year    Drug use: Never       Family History     Family History   Problem Relation Name Age of Onset    Hypertension Mother      Diabetes Mother      Other (cabg) Mother      Other (CARDIAC DISORDER) Mother      Other (CARDIAC PACEMAKER) Mother      Other (CEREBROVASCULAR ACCIDENT) Mother      Heart attack Mother      Kidney disease Father      Hypertension Father      Diabetes Father      Other (CARDIAC DISORDER) Father      Other (CEREBROVASCULAR ACCIDENT) Father          Allergies:  Allergies   Allergen Reactions    Adhesive Tape-Silicones Unknown    Atorvastatin Other     myalgia    Rosuvastatin GI Upset        Outpatient Medications:  Current Outpatient Medications   Medication Instructions    albuterol 90 mcg/actuation inhaler Inhale 2 puffs into the lungs every 6 hours as needed for Wheezing    aspirin 81 mg, oral, Daily    diphenhydrAMINE (SOMINEX) 25 mg, oral, Every 6 hours    famotidine (PEPCID) 20 mg, oral, 2 times daily    ferrous sulfate 325 (65 Fe) MG EC tablet 1 tablet, oral, Daily with breakfast, Do not crush, chew, or split.    gabapentin (Neurontin) 300 mg capsule TAKE 1 CAPSULE NIGHTLY FOR 1 WEEK THEN INCREASE TO 1 CAPSULE TWICE A DAY    losartan (COZAAR) 25 mg, oral, Daily    methocarbamol (Robaxin) 500 mg tablet TAKE 1 TABLET BY MOUTH IN THE EVENING AS NEEDED    omeprazole (PRILOSEC) 40 mg, oral, Daily, Swallow whole. Do not crush or chew.    OneTouch Ultra Test strip use 1 strip to check glucose once daily    Tradjenta 5 mg, oral, Daily        Recent Lab  Results:    CBC:   Lab Results   Component Value Date    WBC 5.9 11/01/2024    RBC 4.90 11/01/2024    HGB 13.1 11/01/2024    HCT 43.4 11/01/2024     11/01/2024        CMP:    Lab Results   Component Value Date     11/01/2024    K 4.2 11/01/2024     11/01/2024    CO2 29 11/01/2024    BUN 13 11/01/2024    CREATININE 0.84 11/01/2024    GLUCOSE 104 (H) 11/01/2024    CALCIUM 9.4 11/01/2024       Magnesium:    Lab Results   Component Value Date    MG 2.00 09/10/2022       Lipid Profile:    Lab Results   Component Value Date    TRIG 142 11/01/2024    HDL 47.0 11/01/2024    LDLCALC 74 11/01/2024       TSH:    Lab Results   Component Value Date    TSH 1.38 08/04/2021       BNP:   Lab Results   Component Value Date    BNP 31 09/25/2022        PT/INR:    Lab Results   Component Value Date    PROTIME 13.3 09/25/2022    INR 1.1 09/25/2022       HgBA1c:    Lab Results   Component Value Date    HGBA1C 6.6 (H) 11/01/2024       BMP:  Lab Results   Component Value Date     11/01/2024     07/22/2024     05/03/2024    K 4.2 11/01/2024    K 4.1 07/22/2024    K 4.4 05/03/2024     11/01/2024     (H) 07/22/2024     05/03/2024    CO2 29 11/01/2024    CO2 27 07/22/2024    CO2 27 05/03/2024    BUN 13 11/01/2024    BUN 14 07/22/2024    BUN 16 05/03/2024    CREATININE 0.84 11/01/2024    CREATININE 0.72 07/22/2024    CREATININE 0.83 05/03/2024       CBC:  Lab Results   Component Value Date    WBC 5.9 11/01/2024    WBC 5.8 07/22/2024    WBC 6.7 05/03/2024    RBC 4.90 11/01/2024    RBC 4.46 07/22/2024    RBC 4.58 05/03/2024    HGB 13.1 11/01/2024    HGB 11.6 (L) 07/22/2024    HGB 11.2 (L) 05/03/2024    HCT 43.4 11/01/2024    HCT 39.2 07/22/2024    HCT 37.5 05/03/2024    MCV 89 11/01/2024    MCV 88 07/22/2024    MCV 82 05/03/2024    MCH 26.7 11/01/2024    MCH 26.0 07/22/2024    MCH 24.5 (L) 05/03/2024    MCHC 30.2 (L) 11/01/2024    MCHC 29.6 (L) 07/22/2024    MCHC 29.9 (L) 05/03/2024    RDW  16.0 (H) 11/01/2024    RDW 17.9 (H) 07/22/2024    RDW 19.1 (H) 05/03/2024     11/01/2024     07/22/2024     05/03/2024       Cardiac Enzymes:    Lab Results   Component Value Date    TROPHS 4 09/25/2022    TROPHS 5 09/25/2022    TROPHS 5 09/25/2022       Hepatic Function Panel:    Lab Results   Component Value Date    ALKPHOS 84 11/01/2024    ALT 14 11/01/2024    AST 16 11/01/2024    PROT 6.7 11/01/2024    BILITOT 0.4 11/01/2024         REVIEW OF SYSTEMS  Review of Systems   Constitutional: Negative. Negative for malaise/fatigue.   Cardiovascular:  Positive for chest pain. Negative for claudication, cyanosis, irregular heartbeat, leg swelling, near-syncope, orthopnea, paroxysmal nocturnal dyspnea and syncope.        Intermittent chest tightness   Respiratory: Negative.  Negative for cough, shortness of breath, snoring and wheezing.    Neurological: Negative.    All other systems reviewed and are negative.      VITALS  Vitals:    12/31/24 1019   BP: 132/84   Pulse: 74     Wt Readings from Last 4 Encounters:   12/31/24 94.8 kg (209 lb)   11/07/24 93 kg (205 lb)   10/19/24 90.7 kg (200 lb)   08/27/24 96.2 kg (212 lb)       PHYSICAL EXAM  Constitutional:       Appearance: Normal and healthy appearance. Well-developed and not in distress.   Eyes:      Conjunctiva/sclera: Conjunctivae normal.      Pupils: Pupils are equal, round, and reactive to light.   Neck:      Vascular: No JVR. JVD normal.   Pulmonary:      Effort: Pulmonary effort is normal.      Breath sounds: Normal breath sounds. No wheezing. No rhonchi. No rales.   Chest:      Chest wall: Not tender to palpatation.   Cardiovascular:      PMI at left midclavicular line. Normal rate. Regular rhythm. Normal S1. Normal S2.       Murmurs: There is no murmur.      No gallop.  No click. No rub.   Pulses:     Intact distal pulses.   Edema:     Peripheral edema absent.   Abdominal:      Tenderness: There is no abdominal tenderness.   Musculoskeletal:  Normal range of motion.         General: No tenderness.      Cervical back: Normal range of motion. Skin:     General: Skin is warm and dry.   Neurological:      General: No focal deficit present.      Mental Status: Alert and oriented to person, place and time.

## 2025-01-26 ENCOUNTER — HOSPITAL ENCOUNTER (EMERGENCY)
Facility: HOSPITAL | Age: 70
Discharge: HOME | End: 2025-01-26
Payer: COMMERCIAL

## 2025-01-26 VITALS
OXYGEN SATURATION: 95 % | WEIGHT: 210 LBS | HEIGHT: 68 IN | SYSTOLIC BLOOD PRESSURE: 145 MMHG | RESPIRATION RATE: 18 BRPM | TEMPERATURE: 98.1 F | DIASTOLIC BLOOD PRESSURE: 79 MMHG | HEART RATE: 72 BPM | BODY MASS INDEX: 31.83 KG/M2

## 2025-01-26 DIAGNOSIS — N30.91 CYSTITIS WITH HEMATURIA: Primary | ICD-10-CM

## 2025-01-26 LAB
APPEARANCE UR: ABNORMAL
BACTERIA #/AREA URNS AUTO: ABNORMAL /HPF
BILIRUB UR STRIP.AUTO-MCNC: NEGATIVE MG/DL
COLOR UR: ABNORMAL
GLUCOSE UR STRIP.AUTO-MCNC: NORMAL MG/DL
HOLD SPECIMEN: NORMAL
KETONES UR STRIP.AUTO-MCNC: NEGATIVE MG/DL
LEUKOCYTE ESTERASE UR QL STRIP.AUTO: ABNORMAL
NITRITE UR QL STRIP.AUTO: NEGATIVE
PH UR STRIP.AUTO: 7 [PH]
PROT UR STRIP.AUTO-MCNC: ABNORMAL MG/DL
RBC # UR STRIP.AUTO: ABNORMAL /UL
RBC #/AREA URNS AUTO: >20 /HPF
SP GR UR STRIP.AUTO: 1.02
SQUAMOUS #/AREA URNS AUTO: ABNORMAL /HPF
UROBILINOGEN UR STRIP.AUTO-MCNC: ABNORMAL MG/DL
WBC #/AREA URNS AUTO: >50 /HPF

## 2025-01-26 PROCEDURE — 99283 EMERGENCY DEPT VISIT LOW MDM: CPT

## 2025-01-26 PROCEDURE — 81001 URINALYSIS AUTO W/SCOPE: CPT | Performed by: REGISTERED NURSE

## 2025-01-26 PROCEDURE — 87086 URINE CULTURE/COLONY COUNT: CPT | Mod: ELYLAB | Performed by: REGISTERED NURSE

## 2025-01-26 RX ORDER — CEPHALEXIN 500 MG/1
500 CAPSULE ORAL 2 TIMES DAILY
Qty: 10 CAPSULE | Refills: 0 | Status: SHIPPED | OUTPATIENT
Start: 2025-01-26 | End: 2025-01-31

## 2025-01-26 ASSESSMENT — PAIN - FUNCTIONAL ASSESSMENT: PAIN_FUNCTIONAL_ASSESSMENT: 0-10

## 2025-01-26 ASSESSMENT — PAIN SCALES - GENERAL
PAINLEVEL_OUTOF10: 0 - NO PAIN
PAINLEVEL_OUTOF10: 0 - NO PAIN

## 2025-01-26 NOTE — ED PROVIDER NOTES
HPI   Chief Complaint   Patient presents with    Urinary Frequency     Urinary fx with hematuria     69-year-old female with past medical history significant for recurrent UTIs presents emergency department today for evaluation of burning with urination.  Patient tells me that for the last 2 days has been experiencing dysuria.  Patient does nottake over-the-counter Azo with some relief.  Patient tells me that she noticed some blood in her urine today which is what presented her to the emergency department.  Patient denies any flank pain.  Patient denies fever or chills.  Patient tells me she does have a history of chronic UTIs however she has not had one in approximately 1 year.  Patient has no other complaints on arrival.    10 point review of systems negative septa mentioned above        History provided by:  Patient   used: No            Patient History   Past Medical History:   Diagnosis Date    Personal history of other diseases of the circulatory system 06/30/2021    History of hypertension    Personal history of other diseases of the digestive system 03/08/2017    History of gastrointestinal hemorrhage    Personal history of other diseases of the digestive system 03/08/2017    History of small bowel obstruction    Personal history of other specified conditions 06/30/2021    History of chest pain    Unspecified atrial fibrillation (Multi) 11/25/2019    Atrial fibrillation with RVR     Past Surgical History:   Procedure Laterality Date    APPENDECTOMY  03/08/2017    Appendectomy    CARDIAC CATHETERIZATION N/A 5/10/2024    Procedure: Left Heart Cath, With LV;  Surgeon: Israel Richardson MD;  Location: [facility];  Service: Cardiovascular;  Laterality: N/A;  possible PCI    CT ABDOMEN PELVIS ANGIOGRAM W AND/OR WO IV CONTRAST  9/7/2022    CT ABDOMEN PELVIS ANGIOGRAM W AND/OR WO IV CONTRAST 9/7/2022 [facility] CLINICAL LEGACY    HYSTERECTOMY  03/08/2017    Hysterectomy    OTHER SURGICAL HISTORY   07/21/2022    Colonoscopy    OTHER SURGICAL HISTORY  01/23/2021    Hip replacement    OTHER SURGICAL HISTORY  12/22/2022    Atrial appendage closure device insertion    OTHER SURGICAL HISTORY  03/15/2021    Renal lithotripsy    OTHER SURGICAL HISTORY  01/27/2022    Colonic polypectomy    OTHER SURGICAL HISTORY  01/27/2022    Urethral dilation    OTHER SURGICAL HISTORY  12/27/2021    Bladder surgery    OTHER SURGICAL HISTORY  12/27/2021    Complete colonoscopy    OTHER SURGICAL HISTORY  12/27/2021    Tubal ligation    OTHER SURGICAL HISTORY  12/27/2021    Tooth extraction    OTHER SURGICAL HISTORY  12/27/2021    [place] tooth extraction    OTHER SURGICAL HISTORY  12/27/2021    Esophagogastroduodenoscopy    ROTATOR CUFF REPAIR  03/08/2017    Rotator Cuff Repair     Family History   Problem Relation Name Age of Onset    Hypertension Mother      Diabetes Mother      Other (cabg) Mother      Other (CARDIAC DISORDER) Mother      Other (CARDIAC PACEMAKER) Mother      Other (CEREBROVASCULAR ACCIDENT) Mother      Heart attack Mother      Kidney disease Father      Hypertension Father      Diabetes Father      Other (CARDIAC DISORDER) Father      Other (CEREBROVASCULAR ACCIDENT) Father       Social History     Tobacco Use    Smoking status: Never     Passive exposure: Never    Smokeless tobacco: Never   Vaping Use    Vaping status: Never Used   Substance Use Topics    Alcohol use: Yes     Comment: 3-4x a year    Drug use: Never       Physical Exam   ED Triage Vitals   Temperature Heart Rate Respirations BP   01/26/25 1127 01/26/25 1127 01/26/25 1127 01/26/25 1127   36.7 °C (98.1 °F) 81 (!) 174 (!) 191/99      Pulse Ox Temp Source Heart Rate Source Patient Position   01/26/25 1127 01/26/25 1127 01/26/25 1127 01/26/25 1223   95 % Temporal Monitor Sitting      BP Location FiO2 (%)     01/26/25 1223 --     Left arm        Physical Exam  Vitals and nursing note reviewed.   Constitutional:       Appearance: Normal appearance.    HENT:      Head: Normocephalic and atraumatic.   Cardiovascular:      Rate and Rhythm: Normal rate.      Pulses: Normal pulses.   Pulmonary:      Effort: Pulmonary effort is normal.   Skin:     General: Skin is warm and dry.      Capillary Refill: Capillary refill takes less than 2 seconds.   Neurological:      General: No focal deficit present.      Mental Status: She is alert. She is disoriented.   Psychiatric:         Mood and Affect: Mood normal.         Behavior: Behavior normal.           ED Course & MDM   Diagnoses as of 01/26/25 1235   Cystitis with hematuria                 No data recorded     [place] Coma Scale Score: 15 (01/26/25 1224 : Alexandra Santiago LPN)                           Medical Decision Making    Patient seen exam emergency department; patient is healthy nontoxic appearance and does not appear in acute distress.  Patient's respirations are even unlabored, skin is warm and dry no diaphoresis noted.  Patient is neurologically intact without any focal deficits.    While waiting for recommended to have patient's urine sent for any analysis.  There is no nitrites but patient does have 500 leukocytes, 3+ blood, and +1 bacteria.  Given patient's presentation and history we will start her on Keflex twice daily for 5 days.  Patient offered Pyridium however she tells me she is comfortable taking Azo at home for her symptoms.  Patient given return parameters which she verbalized understanding of.  All patient's questions and concerns were addressed prior to discharge.  Patient discharged in stable condition.  Procedure  Procedures     Daisy Harden, EDISON-CNP  01/26/25 6314

## 2025-01-28 LAB — BACTERIA UR CULT: NORMAL

## 2025-02-01 LAB
ALBUMIN SERPL-MCNC: 4.3 G/DL (ref 3.6–5.1)
ALBUMIN/CREAT UR: 4 MG/G CREAT
ALP SERPL-CCNC: 83 U/L (ref 37–153)
ALT SERPL-CCNC: 13 U/L (ref 6–29)
ANION GAP SERPL CALCULATED.4IONS-SCNC: 8 MMOL/L (CALC) (ref 7–17)
AST SERPL-CCNC: 16 U/L (ref 10–35)
BASOPHILS # BLD AUTO: 17 CELLS/UL (ref 0–200)
BASOPHILS NFR BLD AUTO: 0.3 %
BILIRUB SERPL-MCNC: 0.5 MG/DL (ref 0.2–1.2)
BUN SERPL-MCNC: 12 MG/DL (ref 7–25)
CALCIUM SERPL-MCNC: 9.3 MG/DL (ref 8.6–10.4)
CHLORIDE SERPL-SCNC: 106 MMOL/L (ref 98–110)
CHOLEST SERPL-MCNC: 164 MG/DL
CHOLEST/HDLC SERPL: 3.6 (CALC)
CO2 SERPL-SCNC: 28 MMOL/L (ref 20–32)
CREAT SERPL-MCNC: 0.74 MG/DL (ref 0.5–1.05)
CREAT UR-MCNC: 135 MG/DL (ref 20–275)
EGFRCR SERPLBLD CKD-EPI 2021: 89 ML/MIN/1.73M2
EOSINOPHIL # BLD AUTO: 139 CELLS/UL (ref 15–500)
EOSINOPHIL NFR BLD AUTO: 2.4 %
ERYTHROCYTE [DISTWIDTH] IN BLOOD BY AUTOMATED COUNT: 14.5 % (ref 11–15)
EST. AVERAGE GLUCOSE BLD GHB EST-MCNC: 146 MG/DL
EST. AVERAGE GLUCOSE BLD GHB EST-SCNC: 8.1 MMOL/L
GLUCOSE SERPL-MCNC: 109 MG/DL (ref 65–99)
HBA1C MFR BLD: 6.7 % OF TOTAL HGB
HCT VFR BLD AUTO: 41.7 % (ref 35–45)
HDLC SERPL-MCNC: 45 MG/DL
HGB BLD-MCNC: 13.1 G/DL (ref 11.7–15.5)
LDLC SERPL CALC-MCNC: 90 MG/DL (CALC)
LYMPHOCYTES # BLD AUTO: 2419 CELLS/UL (ref 850–3900)
LYMPHOCYTES NFR BLD AUTO: 41.7 %
MCH RBC QN AUTO: 27.8 PG (ref 27–33)
MCHC RBC AUTO-ENTMCNC: 31.4 G/DL (ref 32–36)
MCV RBC AUTO: 88.3 FL (ref 80–100)
MICROALBUMIN UR-MCNC: 0.6 MG/DL
MONOCYTES # BLD AUTO: 447 CELLS/UL (ref 200–950)
MONOCYTES NFR BLD AUTO: 7.7 %
NEUTROPHILS # BLD AUTO: 2778 CELLS/UL (ref 1500–7800)
NEUTROPHILS NFR BLD AUTO: 47.9 %
NONHDLC SERPL-MCNC: 119 MG/DL (CALC)
PLATELET # BLD AUTO: 282 THOUSAND/UL (ref 140–400)
PMV BLD REES-ECKER: 10.6 FL (ref 7.5–12.5)
POTASSIUM SERPL-SCNC: 4.3 MMOL/L (ref 3.5–5.3)
PROT SERPL-MCNC: 6.6 G/DL (ref 6.1–8.1)
RBC # BLD AUTO: 4.72 MILLION/UL (ref 3.8–5.1)
SODIUM SERPL-SCNC: 142 MMOL/L (ref 135–146)
TRIGL SERPL-MCNC: 196 MG/DL
WBC # BLD AUTO: 5.8 THOUSAND/UL (ref 3.8–10.8)

## 2025-02-07 ENCOUNTER — APPOINTMENT (OUTPATIENT)
Dept: PRIMARY CARE | Facility: CLINIC | Age: 70
End: 2025-02-07
Payer: COMMERCIAL

## 2025-02-07 VITALS
TEMPERATURE: 97.4 F | HEIGHT: 68 IN | OXYGEN SATURATION: 97 % | BODY MASS INDEX: 32.37 KG/M2 | SYSTOLIC BLOOD PRESSURE: 122 MMHG | RESPIRATION RATE: 18 BRPM | HEART RATE: 78 BPM | DIASTOLIC BLOOD PRESSURE: 80 MMHG | WEIGHT: 213.6 LBS

## 2025-02-07 DIAGNOSIS — Z12.31 ENCOUNTER FOR SCREENING MAMMOGRAM FOR BREAST CANCER: ICD-10-CM

## 2025-02-07 DIAGNOSIS — I10 ESSENTIAL HYPERTENSION: ICD-10-CM

## 2025-02-07 DIAGNOSIS — J44.89 COPD WITH CHRONIC BRONCHITIS (MULTI): ICD-10-CM

## 2025-02-07 DIAGNOSIS — I20.0 ANGINA PECTORIS, UNSTABLE (MULTI): ICD-10-CM

## 2025-02-07 DIAGNOSIS — M47.16 LUMBAR SPONDYLOSIS WITH MYELOPATHY: ICD-10-CM

## 2025-02-07 DIAGNOSIS — E78.2 MIXED HYPERLIPIDEMIA: ICD-10-CM

## 2025-02-07 DIAGNOSIS — I48.0 PAROXYSMAL ATRIAL FIBRILLATION WITH RVR (MULTI): ICD-10-CM

## 2025-02-07 DIAGNOSIS — I48.92 ATRIAL FLUTTER, UNSPECIFIED TYPE (MULTI): ICD-10-CM

## 2025-02-07 DIAGNOSIS — Z00.00 ROUTINE GENERAL MEDICAL EXAMINATION AT HEALTH CARE FACILITY: Primary | ICD-10-CM

## 2025-02-07 DIAGNOSIS — D64.9 CHRONIC ANEMIA: ICD-10-CM

## 2025-02-07 DIAGNOSIS — Z23 NEED FOR INFLUENZA VACCINATION: ICD-10-CM

## 2025-02-07 DIAGNOSIS — F33.1 MODERATE EPISODE OF RECURRENT MAJOR DEPRESSIVE DISORDER: ICD-10-CM

## 2025-02-07 DIAGNOSIS — E11.9 TYPE 2 DIABETES MELLITUS WITHOUT COMPLICATION, WITHOUT LONG-TERM CURRENT USE OF INSULIN (MULTI): ICD-10-CM

## 2025-02-07 DIAGNOSIS — R31.0 GROSS HEMATURIA: ICD-10-CM

## 2025-02-07 LAB
POC APPEARANCE, URINE: ABNORMAL
POC BILIRUBIN, URINE: NEGATIVE
POC BLOOD, URINE: ABNORMAL
POC COLOR, URINE: YELLOW
POC GLUCOSE, URINE: NEGATIVE MG/DL
POC KETONES, URINE: NEGATIVE MG/DL
POC LEUKOCYTES, URINE: ABNORMAL
POC NITRITE,URINE: NEGATIVE
POC PH, URINE: 6 PH
POC PROTEIN, URINE: NEGATIVE MG/DL
POC SPECIFIC GRAVITY, URINE: 1.02
POC UROBILINOGEN, URINE: 1 EU/DL

## 2025-02-07 RX ORDER — GABAPENTIN 300 MG/1
300 CAPSULE ORAL 2 TIMES DAILY
Qty: 60 CAPSULE | Refills: 2 | Status: SHIPPED | OUTPATIENT
Start: 2025-02-07

## 2025-02-07 RX ORDER — PRAVASTATIN SODIUM 20 MG/1
20 TABLET ORAL NIGHTLY
Qty: 30 TABLET | Refills: 2 | Status: SHIPPED | OUTPATIENT
Start: 2025-02-07

## 2025-02-07 RX ORDER — LINAGLIPTIN 5 MG/1
5 TABLET, FILM COATED ORAL DAILY
Qty: 30 TABLET | Refills: 3 | Status: SHIPPED | OUTPATIENT
Start: 2025-02-07

## 2025-02-07 ASSESSMENT — ENCOUNTER SYMPTOMS
POLYPHAGIA: 0
HEMATURIA: 1
BRUISES/BLEEDS EASILY: 0
SHORTNESS OF BREATH: 0
NUMBNESS: 0
COUGH: 0
SORE THROAT: 0
POLYDIPSIA: 0
TREMORS: 0
ADENOPATHY: 0
BACK PAIN: 1
DIABETIC ASSOCIATED SYMPTOMS: 0
ABDOMINAL PAIN: 0
FREQUENCY: 1
WHEEZING: 0
TINGLING: 0
VOMITING: 0
WEAKNESS: 0
PALPITATIONS: 0
DIARRHEA: 0
CHILLS: 0
HEADACHES: 0
DIZZINESS: 0
FLANK PAIN: 0
RHINORRHEA: 0
DYSURIA: 0
FEVER: 0
LEG PAIN: 0
CONSTIPATION: 0

## 2025-02-07 ASSESSMENT — ANXIETY QUESTIONNAIRES
2. NOT BEING ABLE TO STOP OR CONTROL WORRYING: NOT AT ALL
1. FEELING NERVOUS, ANXIOUS, OR ON EDGE: NOT AT ALL
5. BEING SO RESTLESS THAT IT IS HARD TO SIT STILL: NOT AT ALL
6. BECOMING EASILY ANNOYED OR IRRITABLE: NOT AT ALL
GAD7 TOTAL SCORE: 0
7. FEELING AFRAID AS IF SOMETHING AWFUL MIGHT HAPPEN: NOT AT ALL
4. TROUBLE RELAXING: NOT AT ALL
3. WORRYING TOO MUCH ABOUT DIFFERENT THINGS: NOT AT ALL
IF YOU CHECKED OFF ANY PROBLEMS ON THIS QUESTIONNAIRE, HOW DIFFICULT HAVE THESE PROBLEMS MADE IT FOR YOU TO DO YOUR WORK, TAKE CARE OF THINGS AT HOME, OR GET ALONG WITH OTHER PEOPLE: NOT DIFFICULT AT ALL

## 2025-02-07 ASSESSMENT — ACTIVITIES OF DAILY LIVING (ADL)
TAKING_MEDICATION: INDEPENDENT
DOING_HOUSEWORK: INDEPENDENT
DRESSING: INDEPENDENT
BATHING: INDEPENDENT
MANAGING_FINANCES: INDEPENDENT
GROCERY_SHOPPING: INDEPENDENT

## 2025-02-07 NOTE — PROGRESS NOTES
Subjective   Patient ID: Bella Leone is a 69 y.o. female who presents for Medicare Annual Wellness Visit Subsequent, Follow-up (Diabetes, Hypertension, Hyperlipidemia, Back Pain and labs), and ER Follow-up (1/26/2025 Hematuria).    She presents for Annual Medicare Wellness Visit and follow-up on hypertension and hyperlipidemia. She has no chest pain, dyspnea, exertional chest pressure/discomfort, near-syncope, orthopnea, palpitations, paroxysmal nocturnal dyspnea, and syncope. Taking her medication regularly with no side effects.    Diabetes  She presents for her follow-up diabetic visit. She has type 2 diabetes mellitus. Her disease course has been stable. There are no hypoglycemic associated symptoms. Pertinent negatives for hypoglycemia include no dizziness, headaches or tremors. There are no diabetic associated symptoms. Pertinent negatives for diabetes include no chest pain, no polydipsia, no polyphagia, no polyuria and no weakness. There are no hypoglycemic complications. There are no diabetic complications. Risk factors for coronary artery disease include diabetes mellitus, hypertension and post-menopausal. Current diabetic treatment includes oral agent (monotherapy). She is compliant with treatment all of the time. Her weight is stable. She is following a generally healthy diet. When asked about meal planning, she reported none. There is no change in her home blood glucose trend.   Back Pain  This is a chronic problem. The current episode started more than 1 year ago. The problem occurs intermittently. The problem has been waxing and waning since onset. The pain is present in the lumbar spine. The quality of the pain is described as aching. The pain does not radiate. The pain is at a severity of 6/10. The pain is mild. The pain is The same all the time. The symptoms are aggravated by lying down and standing. Stiffness is present All day. Pertinent negatives include no abdominal pain, chest pain,  dysuria, fever, headaches, leg pain, numbness, tingling or weakness. Risk factors include poor posture and lack of exercise. She has tried muscle relaxant for the symptoms. The treatment provided no relief.   Blood in Urine  This is a recurrent problem. The current episode started in the past 7 days. The problem has been resolved since onset. She reports no clotting in her urine stream. Irritative symptoms include frequency and urgency. Pertinent negatives include no abdominal pain, chills, dysuria, fever, flank pain, hesitancy, inability to urinate, urinary retention or vomiting. She is sexually active. Her past medical history is significant for recent infection.      Past Medical, Surgical, and Family History reviewed and updated in chart.    Reviewed all medications by prescribing practitioner or clinical pharmacist (such as prescriptions, OTCs, herbal therapies and supplements) and documented in the medical record.    Patient Self Assessment of Health Status  Patient Self Assessment: Good    Nutrition and Exercise  Current Diet: Well Balanced Diet  Adequate Fluid Intake: Yes  Caffeine: Yes  Exercise Frequency: Infrequently    Functional Ability/Level of Safety  Cognitive Impairment Observed: No cognitive impairment observed  Cognitive Impairment Reported: No cognitive impairment reported by patient or family    Home Safety Risk Factors: None    Review of Systems   Constitutional:  Negative for chills and fever.   HENT:  Negative for congestion, ear pain, nosebleeds, rhinorrhea and sore throat.    Respiratory:  Negative for cough, shortness of breath and wheezing.    Cardiovascular:  Negative for chest pain, palpitations and leg swelling.   Gastrointestinal:  Negative for abdominal pain, constipation, diarrhea and vomiting.   Endocrine: Negative for cold intolerance, heat intolerance, polydipsia, polyphagia and polyuria.   Genitourinary:  Positive for frequency, hematuria and urgency. Negative for decreased  "urine volume, dysuria, flank pain, hesitancy and vaginal bleeding.   Musculoskeletal:  Positive for back pain.   Neurological:  Negative for dizziness, tingling, tremors, weakness, numbness and headaches.   Hematological:  Negative for adenopathy. Does not bruise/bleed easily.       Objective   /80   Pulse 78   Temp 36.3 °C (97.4 °F)   Resp 18   Ht 1.727 m (5' 8\")   Wt 96.9 kg (213 lb 9.6 oz)   SpO2 97%   BMI 32.48 kg/m²     Physical Exam  Constitutional:       General: She is not in acute distress.     Appearance: Normal appearance.   HENT:      Head: Normocephalic and atraumatic.      Mouth/Throat:      Mouth: Mucous membranes are moist.      Pharynx: Oropharynx is clear. No oropharyngeal exudate or posterior oropharyngeal erythema.   Eyes:      General: No scleral icterus.     Extraocular Movements: Extraocular movements intact.      Pupils: Pupils are equal, round, and reactive to light.   Cardiovascular:      Rate and Rhythm: Normal rate and regular rhythm.      Pulses: Normal pulses.      Heart sounds: No murmur heard.     No friction rub. No gallop.   Pulmonary:      Effort: Pulmonary effort is normal.      Breath sounds: No wheezing, rhonchi or rales.   Skin:     General: Skin is warm.      Coloration: Skin is not jaundiced or pale.      Findings: No erythema or rash.   Neurological:      General: No focal deficit present.      Mental Status: She is alert and oriented to person, place, and time.      Cranial Nerves: No cranial nerve deficit.      Sensory: No sensory deficit.      Coordination: Coordination normal.      Gait: Gait normal.         Orders Only on 01/31/2025   Component Date Value Ref Range Status    CHOLESTEROL, TOTAL 01/31/2025 164  <200 mg/dL Final    HDL CHOLESTEROL 01/31/2025 45 (L)  > OR = 50 mg/dL Final    TRIGLYCERIDES 01/31/2025 196 (H)  <150 mg/dL Final    LDL-CHOLESTEROL 01/31/2025 90  mg/dL (calc) Final    Comment: Reference range: <100     Desirable range <100 mg/dL for " primary prevention;    <70 mg/dL for patients with CHD or diabetic patients   with > or = 2 CHD risk factors.     LDL-C is now calculated using the Rohith   calculation, which is a validated novel method providing   better accuracy than the Friedewald equation in the   estimation of LDL-C.   Rahul DIAZ et al. FELA. 2013;310(19): 3725-9266   (http://education.Zuznow/faq/PWY629)      CHOL/HDLC RATIO 01/31/2025 3.6  <5.0 (calc) Final    NON HDL CHOLESTEROL 01/31/2025 119  <130 mg/dL (calc) Final    Comment: For patients with diabetes plus 1 major ASCVD risk   factor, treating to a non-HDL-C goal of <100 mg/dL   (LDL-C of <70 mg/dL) is considered a therapeutic   option.      GLUCOSE 01/31/2025 109 (H)  65 - 99 mg/dL Final    Comment:               Fasting reference interval     For someone without known diabetes, a glucose value  between 100 and 125 mg/dL is consistent with  prediabetes and should be confirmed with a  follow-up test.         UREA NITROGEN (BUN) 01/31/2025 12  7 - 25 mg/dL Final    CREATININE 01/31/2025 0.74  0.50 - 1.05 mg/dL Final    EGFR 01/31/2025 89  > OR = 60 mL/min/1.73m2 Final    SODIUM 01/31/2025 142  135 - 146 mmol/L Final    POTASSIUM 01/31/2025 4.3  3.5 - 5.3 mmol/L Final    CHLORIDE 01/31/2025 106  98 - 110 mmol/L Final    CARBON DIOXIDE 01/31/2025 28  20 - 32 mmol/L Final    ELECTROLYTE BALANCE 01/31/2025 8  7 - 17 mmol/L (calc) Final    CALCIUM 01/31/2025 9.3  8.6 - 10.4 mg/dL Final    PROTEIN, TOTAL 01/31/2025 6.6  6.1 - 8.1 g/dL Final    ALBUMIN 01/31/2025 4.3  3.6 - 5.1 g/dL Final    BILIRUBIN, TOTAL 01/31/2025 0.5  0.2 - 1.2 mg/dL Final    ALKALINE PHOSPHATASE 01/31/2025 83  37 - 153 U/L Final    AST 01/31/2025 16  10 - 35 U/L Final    ALT 01/31/2025 13  6 - 29 U/L Final    CREATININE, RANDOM URINE 01/31/2025 135  20 - 275 mg/dL Final    ALBUMIN, URINE 01/31/2025 0.6  See Note: mg/dL Final    Comment: Reference Range:    Reference Range  Not established       ALBUMIN/CREATININE RATIO, RANDOM U* 01/31/2025 4  <30 mg/g creat Final    Comment:    The ADA defines abnormalities in albumin  excretion as follows:     Albuminuria Category        Result (mg/g creatinine)     Normal to Mildly increased   <30  Moderately increased            Severely increased           > OR = 300     The ADA recommends that at least two of three  specimens collected within a 3-6 month period be  abnormal before considering a patient to be  within a diagnostic category.      WHITE BLOOD CELL COUNT 01/31/2025 5.8  3.8 - 10.8 Thousand/uL Final    RED BLOOD CELL COUNT 01/31/2025 4.72  3.80 - 5.10 Million/uL Final    HEMOGLOBIN 01/31/2025 13.1  11.7 - 15.5 g/dL Final    HEMATOCRIT 01/31/2025 41.7  35.0 - 45.0 % Final    MCV 01/31/2025 88.3  80.0 - 100.0 fL Final    MCH 01/31/2025 27.8  27.0 - 33.0 pg Final    MCHC 01/31/2025 31.4 (L)  32.0 - 36.0 g/dL Final    Comment: For adults, a slight decrease in the calculated MCHC  value (in the range of 30 to 32 g/dL) is most likely  not clinically significant; however, it should be  interpreted with caution in correlation with other  red cell parameters and the patient's clinical  condition.      RDW 01/31/2025 14.5  11.0 - 15.0 % Final    PLATELET COUNT 01/31/2025 282  140 - 400 Thousand/uL Final    MPV 01/31/2025 10.6  7.5 - 12.5 fL Final    ABSOLUTE NEUTROPHILS 01/31/2025 2,778  1,500 - 7,800 cells/uL Final    ABSOLUTE LYMPHOCYTES 01/31/2025 2,419  850 - 3,900 cells/uL Final    ABSOLUTE MONOCYTES 01/31/2025 447  200 - 950 cells/uL Final    ABSOLUTE EOSINOPHILS 01/31/2025 139  15 - 500 cells/uL Final    ABSOLUTE BASOPHILS 01/31/2025 17  0 - 200 cells/uL Final    NEUTROPHILS 01/31/2025 47.9  % Final    LYMPHOCYTES 01/31/2025 41.7  % Final    MONOCYTES 01/31/2025 7.7  % Final    EOSINOPHILS 01/31/2025 2.4  % Final    BASOPHILS 01/31/2025 0.3  % Final    HEMOGLOBIN A1c 01/31/2025 6.7 (H)  <5.7 % of total Hgb Final    Comment: For someone without known  "diabetes, a hemoglobin A1c  value of 6.5% or greater indicates that they may have   diabetes and this should be confirmed with a follow-up   test.     For someone with known diabetes, a value <7% indicates   that their diabetes is well controlled and a value   greater than or equal to 7% indicates suboptimal   control. A1c targets should be individualized based on   duration of diabetes, age, comorbid conditions, and   other considerations.     Currently, no consensus exists regarding use of  hemoglobin A1c for diagnosis of diabetes for children.          eAG (mg/dL) 01/31/2025 146  mg/dL Final    eAG (mmol/L) 01/31/2025 8.1  mmol/L Final     Assessment/Plan   Problem List Items Addressed This Visit       Atrial flutter (Multi)     Status post watchman placement. Stable based on symptoms and exam.  Continue established treatment plan and follow-up at least yearly.         COPD with chronic bronchitis (Multi)     Chronic Condition Documentation : Stable based on symptoms and exam.  Continue established treatment plan and follow-up at least yearly.         Essential hypertension     Well-controlled, continue current medications and management.  Dietary sodium restriction.  Regular aerobic exercise program is recommended to help achieve and maintain normal body weight, fitness and improve lipid balance. .  Dietary changes: Increase soluble fiber  Plant sterols 2grams per day (e.g. Benecol)  Reduce saturated fat, \"trans\" monounsaturated fatty acids, and cholesterol         Relevant Orders    Comprehensive Metabolic Panel    CBC and Auto Differential    Follow Up In [facility] - PCP    Hemoglobin A1C    Lipid Panel    Lumbar spondylosis with myelopathy     Stable, continue current medications and management.  Natural history and expected course discussed. Questions answered.  Educational material distributed.  Proper lifting, bending technique discussed.  Stretching exercises discussed.  Regular aerobic and " "trunk strengthening exercises discussed.  Ice to affected area as needed for local pain relief.  Heat to affected area as needed for local pain relief.         Relevant Medications    gabapentin (Neurontin) 300 mg capsule    Chronic anemia     Stable based on symptoms and exam.  Continue established treatment plan.         Mixed hyperlipidemia     We will start her on Pravastatin 20 mg nightly. The nature of cardiac risk has been fully discussed with this patient. Discussed cardiovascular risk analysis and appropriate diet with the need for lifelong measures to reduce the risk. A regular exercise program is recommended to help achieve and maintain normal body weight, fitness and improve lipid balance. Patient education provided. They understand and agree with this course of treatment. They will return with new or worsening symptoms. Patient instructed to remain current with appropriate annual health maintenance.          Relevant Medications    pravastatin (Pravachol) 20 mg tablet    Other Relevant Orders    Comprehensive Metabolic Panel    CBC and Auto Differential    Follow Up In [facility] - PCP    Hemoglobin A1C    Lipid Panel    Moderate episode of recurrent major depressive disorder     Chronic Condition Documentation : Stable based on symptoms and exam.  Continue established treatment plan and follow-up at least yearly.         Paroxysmal atrial fibrillation with RVR (Multi)     Dietary sodium restriction.  Regular aerobic exercise program is recommended to help achieve and maintain normal body weight, fitness and improve lipid balance. .  Dietary changes: Increase soluble fiber  Plant sterols 2grams per day (e.g. Benecol)  Reduce saturated fat, \"trans\" monounsaturated fatty acids, and cholesterol           Type 2 diabetes mellitus without complication, without long-term current use of insulin (Multi)     Diabetes Mellitus type II, under good control.  1. Rx changes: None  2. Education: Reviewed " ‘ABCs’ of diabetes management (respective goals in parentheses):  A1C (<7), blood pressure (<130/80), and cholesterol (LDL <100).  3. Compliance at present is estimated to be good. Efforts to improve compliance (if necessary) will be directed at dietary modifications: and increased exercise.  4. Follow up: 3 months         Relevant Medications    linaGLIPtin (Tradjenta) 5 mg tablet    Other Relevant Orders    Comprehensive Metabolic Panel    CBC and Auto Differential    Follow Up In [facility] - PCP    Hemoglobin A1C    Lipid Panel    Routine general medical examination at health care facility - Primary     Recommend low-cholesterol diet, low-fat diet and low-salt diet.  The need for lifelong dietary compliance in order to reduce cardiac risk is recommended.  We will also recommend regular exercise program to improve lipid balance and overall health.  Recommend decreasing fat and cholesterol in diet, increasing aerobic exercise with a goal of 4 or more days per week         Angina pectoris, unstable (Multi)     Chronic Condition Documentation : Stable based on symptoms and exam.  Continue established treatment plan and follow-up at least yearly.         Gross hematuria     We will order Ultrasound of the Bladder and Kidneys, Urinalysis, and Urine Culture for further evaluation.         Relevant Orders    POCT UA Automated manually resulted (Completed)    Urinalysis with Reflex Microscopic    Urine Culture    US renal complete    Referral to Urology    US bladder     Other Visit Diagnoses       Need for influenza vaccination        Relevant Orders    Flu vaccine, trivalent, preservative free, HIGH-DOSE, age 65y+ (Fluzone) (Completed)    Encounter for screening mammogram for breast cancer        Relevant Orders    BI mammo bilateral screening tomosynthesis          Scribe Attestation  By signing my name below, IGreg Scribe   attest that this documentation has been prepared under the direction  and in the presence of Hany Leo MD.

## 2025-02-07 NOTE — ASSESSMENT & PLAN NOTE
We will order Ultrasound of the Bladder and Kidneys, Urinalysis, and Urine Culture for further evaluation.

## 2025-02-07 NOTE — ASSESSMENT & PLAN NOTE
Status post watchman placement. Stable based on symptoms and exam.  Continue established treatment plan and follow-up at least yearly.

## 2025-02-07 NOTE — ASSESSMENT & PLAN NOTE
We will start her on Pravastatin 20 mg nightly. The nature of cardiac risk has been fully discussed with this patient. Discussed cardiovascular risk analysis and appropriate diet with the need for lifelong measures to reduce the risk. A regular exercise program is recommended to help achieve and maintain normal body weight, fitness and improve lipid balance. Patient education provided. They understand and agree with this course of treatment. They will return with new or worsening symptoms. Patient instructed to remain current with appropriate annual health maintenance.

## 2025-02-09 LAB
APPEARANCE UR: CLEAR
BACTERIA #/AREA URNS HPF: ABNORMAL /HPF
BACTERIA UR CULT: ABNORMAL
BILIRUB UR QL STRIP: NEGATIVE
COLOR UR: YELLOW
GLUCOSE UR QL STRIP: NEGATIVE
HGB UR QL STRIP: ABNORMAL
HYALINE CASTS #/AREA URNS LPF: ABNORMAL /LPF
KETONES UR QL STRIP: NEGATIVE
LEUKOCYTE ESTERASE UR QL STRIP: ABNORMAL
NITRITE UR QL STRIP: NEGATIVE
PH UR STRIP: 6 [PH] (ref 5–8)
PROT UR QL STRIP: NEGATIVE
RBC #/AREA URNS HPF: ABNORMAL /HPF
SERVICE CMNT-IMP: ABNORMAL
SP GR UR STRIP: 1.02 (ref 1–1.03)
SQUAMOUS #/AREA URNS HPF: ABNORMAL /HPF
WBC #/AREA URNS HPF: ABNORMAL /HPF

## 2025-02-10 DIAGNOSIS — N39.0 ACUTE UTI: ICD-10-CM

## 2025-02-10 DIAGNOSIS — N39.0 ACUTE UTI: Primary | ICD-10-CM

## 2025-02-10 RX ORDER — SULFAMETHOXAZOLE AND TRIMETHOPRIM 800; 160 MG/1; MG/1
1 TABLET ORAL 2 TIMES DAILY
Qty: 14 TABLET | Refills: 0 | Status: SHIPPED | OUTPATIENT
Start: 2025-02-10 | End: 2025-02-17

## 2025-02-10 RX ORDER — SULFAMETHOXAZOLE AND TRIMETHOPRIM 800; 160 MG/1; MG/1
1 TABLET ORAL 2 TIMES DAILY
Qty: 14 TABLET | Refills: 0 | Status: SHIPPED | OUTPATIENT
Start: 2025-02-10 | End: 2025-02-10 | Stop reason: SDUPTHER

## 2025-02-10 NOTE — TELEPHONE ENCOUNTER
Patient called in requesting the bactrim be sent to the [facility] in [place]. She stated she recently moved  [facility] [address] [place] [address] - [address] [facility]   Please Advise

## 2025-02-18 ENCOUNTER — HOSPITAL ENCOUNTER (OUTPATIENT)
Dept: RADIOLOGY | Facility: HOSPITAL | Age: 70
Discharge: HOME | End: 2025-02-18
Payer: COMMERCIAL

## 2025-02-18 DIAGNOSIS — Z12.31 ENCOUNTER FOR SCREENING MAMMOGRAM FOR BREAST CANCER: ICD-10-CM

## 2025-02-18 DIAGNOSIS — R31.0 GROSS HEMATURIA: ICD-10-CM

## 2025-02-18 DIAGNOSIS — N63.20 MASS OF LEFT BREAST, UNSPECIFIED QUADRANT: ICD-10-CM

## 2025-02-18 DIAGNOSIS — R92.8 ABNORMAL MAMMOGRAM: Primary | ICD-10-CM

## 2025-02-18 PROCEDURE — 77067 SCR MAMMO BI INCL CAD: CPT

## 2025-02-18 PROCEDURE — 76770 US EXAM ABDO BACK WALL COMP: CPT | Performed by: STUDENT IN AN ORGANIZED HEALTH CARE EDUCATION/TRAINING PROGRAM

## 2025-02-18 PROCEDURE — 76770 US EXAM ABDO BACK WALL COMP: CPT

## 2025-02-18 PROCEDURE — 77063 BREAST TOMOSYNTHESIS BI: CPT | Performed by: RADIOLOGY

## 2025-02-18 PROCEDURE — 77067 SCR MAMMO BI INCL CAD: CPT | Performed by: RADIOLOGY

## 2025-03-20 ENCOUNTER — HOSPITAL ENCOUNTER (OUTPATIENT)
Dept: RADIOLOGY | Facility: HOSPITAL | Age: 70
Discharge: HOME | End: 2025-03-20
Payer: COMMERCIAL

## 2025-03-20 VITALS — BODY MASS INDEX: 31.93 KG/M2 | WEIGHT: 210 LBS

## 2025-03-20 DIAGNOSIS — N63.20 MASS OF LEFT BREAST, UNSPECIFIED QUADRANT: ICD-10-CM

## 2025-03-20 DIAGNOSIS — R92.8 ABNORMAL MAMMOGRAM: ICD-10-CM

## 2025-03-20 PROCEDURE — 76642 ULTRASOUND BREAST LIMITED: CPT | Mod: LT

## 2025-03-20 PROCEDURE — 77065 DX MAMMO INCL CAD UNI: CPT | Mod: LT

## 2025-04-11 ENCOUNTER — OFFICE VISIT (OUTPATIENT)
Dept: PRIMARY CARE | Facility: CLINIC | Age: 70
End: 2025-04-11
Payer: COMMERCIAL

## 2025-04-11 VITALS
HEART RATE: 67 BPM | RESPIRATION RATE: 18 BRPM | WEIGHT: 213.8 LBS | TEMPERATURE: 97.4 F | HEIGHT: 68 IN | OXYGEN SATURATION: 97 % | BODY MASS INDEX: 32.4 KG/M2 | SYSTOLIC BLOOD PRESSURE: 120 MMHG | DIASTOLIC BLOOD PRESSURE: 86 MMHG

## 2025-04-11 DIAGNOSIS — M75.101 NONTRAUMATIC TEAR OF RIGHT ROTATOR CUFF, UNSPECIFIED TEAR EXTENT: ICD-10-CM

## 2025-04-11 DIAGNOSIS — M12.811 ROTATOR CUFF ARTHROPATHY OF RIGHT SHOULDER: Primary | ICD-10-CM

## 2025-04-11 DIAGNOSIS — J44.1 COPD EXACERBATION (MULTI): ICD-10-CM

## 2025-04-11 PROCEDURE — 3074F SYST BP LT 130 MM HG: CPT | Performed by: FAMILY MEDICINE

## 2025-04-11 PROCEDURE — 3008F BODY MASS INDEX DOCD: CPT | Performed by: FAMILY MEDICINE

## 2025-04-11 PROCEDURE — 1159F MED LIST DOCD IN RCRD: CPT | Performed by: FAMILY MEDICINE

## 2025-04-11 PROCEDURE — G2211 COMPLEX E/M VISIT ADD ON: HCPCS | Performed by: FAMILY MEDICINE

## 2025-04-11 PROCEDURE — 1160F RVW MEDS BY RX/DR IN RCRD: CPT | Performed by: FAMILY MEDICINE

## 2025-04-11 PROCEDURE — 1123F ACP DISCUSS/DSCN MKR DOCD: CPT | Performed by: FAMILY MEDICINE

## 2025-04-11 PROCEDURE — 4010F ACE/ARB THERAPY RXD/TAKEN: CPT | Performed by: FAMILY MEDICINE

## 2025-04-11 PROCEDURE — 99213 OFFICE O/P EST LOW 20 MIN: CPT | Performed by: FAMILY MEDICINE

## 2025-04-11 PROCEDURE — 3079F DIAST BP 80-89 MM HG: CPT | Performed by: FAMILY MEDICINE

## 2025-04-11 RX ORDER — METHYLPREDNISOLONE 4 MG/1
TABLET ORAL
Qty: 21 TABLET | Refills: 0 | Status: SHIPPED | OUTPATIENT
Start: 2025-04-11 | End: 2025-04-17

## 2025-04-11 RX ORDER — AZITHROMYCIN 250 MG/1
TABLET, FILM COATED ORAL
Qty: 6 TABLET | Refills: 0 | Status: SHIPPED | OUTPATIENT
Start: 2025-04-11 | End: 2025-04-16

## 2025-04-11 RX ORDER — PREDNISONE 5 MG/1
TABLET ORAL
COMMUNITY
Start: 2024-04-17

## 2025-04-11 ASSESSMENT — ENCOUNTER SYMPTOMS
SORE THROAT: 0
HEMOPTYSIS: 0
LIMITED RANGE OF MOTION: 1
TINGLING: 0
STIFFNESS: 1
COUGH: 1
JOINT LOCKING: 0
NUMBNESS: 0
WHEEZING: 1
FEVER: 0

## 2025-04-11 ASSESSMENT — COPD QUESTIONNAIRES: COPD: 1

## 2025-04-11 NOTE — ASSESSMENT & PLAN NOTE
Natural history and expected course discussed. Questions answered.  Reduction in offending activity.  OTC analgesics as needed.  Plain film x-rays.  MRI.  Follow-up if persistent or worsening symptoms otherwise when necessary.

## 2025-04-11 NOTE — PROGRESS NOTES
Subjective   Patient ID: Bella Leone is a 69 y.o. female who presents for Shoulder Pain and Cough.    Shoulder Pain   The pain is present in the right shoulder and right arm. This is a new problem. Episode onset: a few weeks ago. There has been no history of extremity trauma. The problem occurs constantly. The problem has been unchanged. The quality of the pain is described as sharp. The pain is at a severity of 8/10. The pain is moderate. Associated symptoms include a limited range of motion and stiffness. Pertinent negatives include no fever, joint locking, joint swelling, numbness or tingling. The symptoms are aggravated by activity. She has tried acetaminophen and NSAIDS for the symptoms. The treatment provided no relief. Family history does not include gout or rheumatoid arthritis. Her past medical history is significant for diabetes and osteoarthritis.   Cough  This is a new problem. Episode onset: 3 weeks ago. The problem has been unchanged. The cough is Non-productive. Associated symptoms include wheezing. Pertinent negatives include no chest pain, chills, ear pain, fever, headaches, hemoptysis, rhinorrhea, sore throat or shortness of breath. Nothing aggravates the symptoms. Her past medical history is significant for COPD.        Review of Systems   Constitutional:  Negative for chills and fever.   HENT:  Negative for congestion, ear pain, nosebleeds, rhinorrhea and sore throat.    Respiratory:  Positive for cough and wheezing. Negative for hemoptysis, chest tightness and shortness of breath.    Cardiovascular:  Negative for chest pain, palpitations and leg swelling.   Gastrointestinal:  Negative for abdominal pain, constipation, diarrhea and vomiting.   Genitourinary:  Negative for dysuria, frequency and hematuria.   Musculoskeletal:  Positive for stiffness.   Neurological:  Negative for dizziness, tingling, tremors, numbness and headaches.       Objective   /86 (BP Location: Left arm, Patient  "Position: Sitting)   Pulse 67   Temp 36.3 °C (97.4 °F)   Resp 18   Ht 1.727 m (5' 8\")   Wt 97 kg (213 lb 12.8 oz)   LMP  (LMP Unknown)   SpO2 97%   BMI 32.51 kg/m²     Physical Exam  Constitutional:       General: She is not in acute distress.     Appearance: Normal appearance.   HENT:      Head: Normocephalic and atraumatic.      Mouth/Throat:      Mouth: Mucous membranes are moist.      Pharynx: Oropharynx is clear. No oropharyngeal exudate or posterior oropharyngeal erythema.   Eyes:      General: No scleral icterus.     Extraocular Movements: Extraocular movements intact.      Pupils: Pupils are equal, round, and reactive to light.   Cardiovascular:      Rate and Rhythm: Normal rate and regular rhythm.      Pulses: Normal pulses.      Heart sounds: No murmur heard.     No friction rub. No gallop.   Pulmonary:      Effort: Pulmonary effort is normal.      Breath sounds: Examination of the right-lower field reveals decreased breath sounds and wheezing. Examination of the left-lower field reveals decreased breath sounds and wheezing. Decreased breath sounds and wheezing present. No rhonchi or rales.      Comments: Examination of the right shoulder reveals tenderness over the glenohumeral joint which restricted abduction of the shoulder.  Impingement sign was positive.  Apprehension test was positive.  Neer test was positive.         Skin:     General: Skin is warm.      Coloration: Skin is not jaundiced or pale.      Findings: No erythema or rash.   Neurological:      General: No focal deficit present.      Mental Status: She is alert and oriented to person, place, and time.      Cranial Nerves: No cranial nerve deficit.      Sensory: No sensory deficit.      Coordination: Coordination normal.      Gait: Gait normal.         Assessment/Plan   Problem List Items Addressed This Visit       COPD exacerbation (Multi)     We will start her on Azithromycin and Medrol Dose Pack.  Recommend liberal oral fluid " intake.  Call if symptoms fail to improve as expected.    Follow-up if persistent or worsening symptoms otherwise when necessary.         Relevant Medications    methylPREDNISolone (Medrol Dospak) 4 mg tablets    azithromycin (Zithromax) 250 mg tablet    Nontraumatic tear of right rotator cuff     Natural history and expected course discussed. Questions answered.  Reduction in offending activity.  OTC analgesics as needed.  MRI.  Follow-up if persistent or worsening symptoms otherwise when necessary.         Relevant Orders    MR shoulder right wo IV contrast    Rotator cuff arthropathy of right shoulder - Primary     Natural history and expected course discussed. Questions answered.  Reduction in offending activity.  OTC analgesics as needed.  Plain film x-rays.  MRI.  Follow-up if persistent or worsening symptoms otherwise when necessary.         Relevant Orders    MR shoulder right wo IV contrast    XR shoulder right 2+ views     Scribe Attestation  By signing my name below, IGreg, Scrprincess   attest that this documentation has been prepared under the direction and in the presence of Hany Leo MD.

## 2025-04-11 NOTE — ASSESSMENT & PLAN NOTE
We will start her on Azithromycin and Medrol Dose Pack.  Recommend liberal oral fluid intake.  Call if symptoms fail to improve as expected.    Follow-up if persistent or worsening symptoms otherwise when necessary.   [Discussed Cessation Strategies] : cessation strategies were discussed

## 2025-04-11 NOTE — ASSESSMENT & PLAN NOTE
Natural history and expected course discussed. Questions answered.  Reduction in offending activity.  OTC analgesics as needed.  MRI.  Follow-up if persistent or worsening symptoms otherwise when necessary.

## 2025-04-12 ASSESSMENT — ENCOUNTER SYMPTOMS
DIZZINESS: 0
ABDOMINAL PAIN: 0
FREQUENCY: 0
CONSTIPATION: 0
CHEST TIGHTNESS: 0
TREMORS: 0
DYSURIA: 0
CHILLS: 0
RHINORRHEA: 0
DIARRHEA: 0
VOMITING: 0
PALPITATIONS: 0
HEADACHES: 0
HEMATURIA: 0
SHORTNESS OF BREATH: 0

## 2025-04-15 ENCOUNTER — HOSPITAL ENCOUNTER (OUTPATIENT)
Dept: RADIOLOGY | Facility: HOSPITAL | Age: 70
Discharge: HOME | End: 2025-04-15
Payer: COMMERCIAL

## 2025-04-15 DIAGNOSIS — M12.811 ROTATOR CUFF ARTHROPATHY OF RIGHT SHOULDER: ICD-10-CM

## 2025-04-15 PROCEDURE — 73030 X-RAY EXAM OF SHOULDER: CPT | Mod: RT

## 2025-04-15 PROCEDURE — 73030 X-RAY EXAM OF SHOULDER: CPT | Mod: RIGHT SIDE | Performed by: RADIOLOGY

## 2025-04-25 ENCOUNTER — TELEPHONE (OUTPATIENT)
Dept: PRIMARY CARE | Facility: CLINIC | Age: 70
End: 2025-04-25
Payer: COMMERCIAL

## 2025-04-25 DIAGNOSIS — M54.12 CERVICAL RADICULOPATHY: ICD-10-CM

## 2025-04-25 RX ORDER — METHYLPREDNISOLONE 4 MG/1
TABLET ORAL
Qty: 21 TABLET | Refills: 0 | Status: SHIPPED | OUTPATIENT
Start: 2025-04-25

## 2025-04-25 NOTE — TELEPHONE ENCOUNTER
Per AP, patient unable to take NSAIDS d/t GI bleed. Patient may take tylenol arthritis, also Pend Medrol dose pack. Patient blood sugar may elevate with use of steroid. MRI scheduled for 5/5/25 will await results. Patient notified of AP recommendations and to monitor blood sugar.

## 2025-04-25 NOTE — TELEPHONE ENCOUNTER
Patient  calling she is having a lot of pain in her right shoulder from rotator cuff tear, wants to know if you will send in something to pharmacy to help with pain, [facility] [place]. Please advise?

## 2025-05-05 ENCOUNTER — ANCILLARY PROCEDURE (OUTPATIENT)
Facility: HOSPITAL | Age: 70
End: 2025-05-05
Payer: COMMERCIAL

## 2025-05-05 DIAGNOSIS — M75.101 NONTRAUMATIC TEAR OF RIGHT ROTATOR CUFF, UNSPECIFIED TEAR EXTENT: ICD-10-CM

## 2025-05-05 DIAGNOSIS — M12.811 ROTATOR CUFF ARTHROPATHY OF RIGHT SHOULDER: ICD-10-CM

## 2025-05-05 PROCEDURE — 73221 MRI JOINT UPR EXTREM W/O DYE: CPT | Mod: RIGHT SIDE | Performed by: RADIOLOGY

## 2025-05-05 PROCEDURE — 73221 MRI JOINT UPR EXTREM W/O DYE: CPT | Mod: RT

## 2025-05-06 DIAGNOSIS — M12.811 ROTATOR CUFF ARTHROPATHY OF RIGHT SHOULDER: Primary | ICD-10-CM

## 2025-05-07 DIAGNOSIS — E78.2 MIXED HYPERLIPIDEMIA: ICD-10-CM

## 2025-05-07 DIAGNOSIS — I10 ESSENTIAL HYPERTENSION: ICD-10-CM

## 2025-05-07 DIAGNOSIS — E11.9 TYPE 2 DIABETES MELLITUS WITHOUT COMPLICATION, WITHOUT LONG-TERM CURRENT USE OF INSULIN: ICD-10-CM

## 2025-05-09 LAB
ALBUMIN SERPL-MCNC: 4.1 G/DL (ref 3.6–5.1)
ALP SERPL-CCNC: 79 U/L (ref 37–153)
ALT SERPL-CCNC: 15 U/L (ref 6–29)
ANION GAP SERPL CALCULATED.4IONS-SCNC: 9 MMOL/L (CALC) (ref 7–17)
AST SERPL-CCNC: 14 U/L (ref 10–35)
BASOPHILS # BLD AUTO: 29 CELLS/UL (ref 0–200)
BASOPHILS NFR BLD AUTO: 0.4 %
BILIRUB SERPL-MCNC: 0.3 MG/DL (ref 0.2–1.2)
BUN SERPL-MCNC: 14 MG/DL (ref 7–25)
CALCIUM SERPL-MCNC: 9.3 MG/DL (ref 8.6–10.4)
CHLORIDE SERPL-SCNC: 105 MMOL/L (ref 98–110)
CHOLEST SERPL-MCNC: 189 MG/DL
CHOLEST/HDLC SERPL: 4.1 (CALC)
CO2 SERPL-SCNC: 30 MMOL/L (ref 20–32)
CREAT SERPL-MCNC: 0.65 MG/DL (ref 0.5–1.05)
EGFRCR SERPLBLD CKD-EPI 2021: 96 ML/MIN/1.73M2
EOSINOPHIL # BLD AUTO: 151 CELLS/UL (ref 15–500)
EOSINOPHIL NFR BLD AUTO: 2.1 %
ERYTHROCYTE [DISTWIDTH] IN BLOOD BY AUTOMATED COUNT: 15.2 % (ref 11–15)
EST. AVERAGE GLUCOSE BLD GHB EST-MCNC: 146 MG/DL
EST. AVERAGE GLUCOSE BLD GHB EST-SCNC: 8.1 MMOL/L
GLUCOSE SERPL-MCNC: 113 MG/DL (ref 65–99)
HBA1C MFR BLD: 6.7 %
HCT VFR BLD AUTO: 42.9 % (ref 35–45)
HDLC SERPL-MCNC: 46 MG/DL
HGB BLD-MCNC: 13.7 G/DL (ref 11.7–15.5)
LDLC SERPL CALC-MCNC: 108 MG/DL (CALC)
LYMPHOCYTES # BLD AUTO: 2866 CELLS/UL (ref 850–3900)
LYMPHOCYTES NFR BLD AUTO: 39.8 %
MCH RBC QN AUTO: 29.3 PG (ref 27–33)
MCHC RBC AUTO-ENTMCNC: 31.9 G/DL (ref 32–36)
MCV RBC AUTO: 91.7 FL (ref 80–100)
MONOCYTES # BLD AUTO: 554 CELLS/UL (ref 200–950)
MONOCYTES NFR BLD AUTO: 7.7 %
NEUTROPHILS # BLD AUTO: 3600 CELLS/UL (ref 1500–7800)
NEUTROPHILS NFR BLD AUTO: 50 %
NONHDLC SERPL-MCNC: 143 MG/DL (CALC)
PLATELET # BLD AUTO: 258 THOUSAND/UL (ref 140–400)
PMV BLD REES-ECKER: 10.8 FL (ref 7.5–12.5)
POTASSIUM SERPL-SCNC: 4.4 MMOL/L (ref 3.5–5.3)
PROT SERPL-MCNC: 6.7 G/DL (ref 6.1–8.1)
RBC # BLD AUTO: 4.68 MILLION/UL (ref 3.8–5.1)
SODIUM SERPL-SCNC: 144 MMOL/L (ref 135–146)
TRIGL SERPL-MCNC: 231 MG/DL
WBC # BLD AUTO: 7.2 THOUSAND/UL (ref 3.8–10.8)

## 2025-05-14 ENCOUNTER — OFFICE VISIT (OUTPATIENT)
Dept: ORTHOPEDIC SURGERY | Facility: CLINIC | Age: 70
End: 2025-05-14
Payer: COMMERCIAL

## 2025-05-14 DIAGNOSIS — M12.811 ROTATOR CUFF ARTHROPATHY OF RIGHT SHOULDER: ICD-10-CM

## 2025-05-14 PROCEDURE — 1036F TOBACCO NON-USER: CPT | Performed by: ORTHOPAEDIC SURGERY

## 2025-05-14 PROCEDURE — 99214 OFFICE O/P EST MOD 30 MIN: CPT | Performed by: ORTHOPAEDIC SURGERY

## 2025-05-14 PROCEDURE — 99212 OFFICE O/P EST SF 10 MIN: CPT | Performed by: ORTHOPAEDIC SURGERY

## 2025-05-14 PROCEDURE — 4010F ACE/ARB THERAPY RXD/TAKEN: CPT | Performed by: ORTHOPAEDIC SURGERY

## 2025-05-14 NOTE — PROGRESS NOTES
History of present illness: History right shoulder rotator cuff repair by Dr. Rivers many years ago she developed some secondary mild arthritic changes, recurrent rotator cuff tear        Physical exam:    General: No acute distress or breathing difficulty or discomfort, pleasant and cooperative with the examination.    Extremities: The right shoulder was inspected and was found to have no obvious deformity.  There was tenderness to touch over the lateral edges of the shoulder over the rotator cuff insertion.  Active forward flexion 110 degrees, external rotation to 50 degrees, abduction to 45 degrees, and internal rotation to the level of L2.    At this time the shoulder is neurovascular tact and neurosensory intact.  Motor intact C5-T1.  There was no obvious neck pain or radiculopathy noted.  There was no gross instability or adhesive capsulitis symptoms.  There was no evidence of apprehension or apprehension suppression.    Strength was tested in 4 planes with weakness in the supraspinatus strength testing and external rotation position.  There was no strength deficit in internal rotation.  Impingement signs were positive both supine and standing for impingement test type I and II.  There was mild pain over the bicipital groove with a positive speeds sign    Diagnostic studies: X-rays are reviewed and MRIs reviewed small recurrent full-thickness rotator cuff tear there is a lot of artifact at the site of the repair there may be some degeneration some calcific deposits as well there are some subacromial spurring as well.  There is definitely some early arthritic change developing in the shoulder    Impression: Combination of early arthritic change now with recurrent rotator cuff tear right shoulder    Plan: Patient had brief response to oral cortisone therapy self-guided exercise program    We offered a cortisone shot she declined she like to proceed with surgical repair due to the fact that she has a  full-thickness tear    We gave her a 75% success rate    Risk and benefits of surgery discussed extensively with the patient.    Surgical risk included but were not limited to infection, wear, loosening, need for further surgery blood clot, failure to heal, failure of the surgery, stiffness, loss of limb life, extremity function change in length change, and associated risks of  any surgery.    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 Houston so she is aware of the risks and benefits of the arthroscopy and the rehab program we will see her on the operative schedule at [facility] as she does have a cardiac history of watchman and a history of A-fib

## 2025-05-15 ENCOUNTER — APPOINTMENT (OUTPATIENT)
Dept: PRIMARY CARE | Facility: CLINIC | Age: 70
End: 2025-05-15
Payer: COMMERCIAL

## 2025-05-15 VITALS
HEIGHT: 68 IN | TEMPERATURE: 97.5 F | OXYGEN SATURATION: 97 % | BODY MASS INDEX: 32.89 KG/M2 | HEART RATE: 68 BPM | RESPIRATION RATE: 19 BRPM | DIASTOLIC BLOOD PRESSURE: 80 MMHG | WEIGHT: 217 LBS | SYSTOLIC BLOOD PRESSURE: 128 MMHG

## 2025-05-15 DIAGNOSIS — M12.811 ROTATOR CUFF ARTHROPATHY OF RIGHT SHOULDER: ICD-10-CM

## 2025-05-15 DIAGNOSIS — M47.16 LUMBAR SPONDYLOSIS WITH MYELOPATHY: ICD-10-CM

## 2025-05-15 DIAGNOSIS — K21.9 GASTRO-ESOPHAGEAL REFLUX DISEASE WITHOUT ESOPHAGITIS: ICD-10-CM

## 2025-05-15 DIAGNOSIS — E78.2 MIXED HYPERLIPIDEMIA: ICD-10-CM

## 2025-05-15 DIAGNOSIS — E11.9 TYPE 2 DIABETES MELLITUS WITHOUT COMPLICATION, WITHOUT LONG-TERM CURRENT USE OF INSULIN: Primary | ICD-10-CM

## 2025-05-15 DIAGNOSIS — I10 ESSENTIAL HYPERTENSION: ICD-10-CM

## 2025-05-15 PROBLEM — M75.121 COMPLETE ROTATOR CUFF TEAR OR RUPTURE OF RIGHT SHOULDER, NOT SPECIFIED AS TRAUMATIC: Status: ACTIVE | Noted: 2025-06-27

## 2025-05-15 PROCEDURE — 1159F MED LIST DOCD IN RCRD: CPT | Performed by: FAMILY MEDICINE

## 2025-05-15 PROCEDURE — 4010F ACE/ARB THERAPY RXD/TAKEN: CPT | Performed by: FAMILY MEDICINE

## 2025-05-15 PROCEDURE — 99214 OFFICE O/P EST MOD 30 MIN: CPT | Performed by: FAMILY MEDICINE

## 2025-05-15 PROCEDURE — 1036F TOBACCO NON-USER: CPT | Performed by: FAMILY MEDICINE

## 2025-05-15 PROCEDURE — 1160F RVW MEDS BY RX/DR IN RCRD: CPT | Performed by: FAMILY MEDICINE

## 2025-05-15 PROCEDURE — 3078F DIAST BP <80 MM HG: CPT | Performed by: FAMILY MEDICINE

## 2025-05-15 PROCEDURE — 3008F BODY MASS INDEX DOCD: CPT | Performed by: FAMILY MEDICINE

## 2025-05-15 PROCEDURE — G2211 COMPLEX E/M VISIT ADD ON: HCPCS | Performed by: FAMILY MEDICINE

## 2025-05-15 PROCEDURE — 3074F SYST BP LT 130 MM HG: CPT | Performed by: FAMILY MEDICINE

## 2025-05-15 RX ORDER — HYDROCODONE BITARTRATE AND ACETAMINOPHEN 5; 325 MG/1; MG/1
1 TABLET ORAL EVERY 6 HOURS PRN
Qty: 24 TABLET | Refills: 0 | Status: SHIPPED | OUTPATIENT
Start: 2025-05-15

## 2025-05-15 RX ORDER — OMEPRAZOLE 40 MG/1
40 CAPSULE, DELAYED RELEASE ORAL DAILY
Qty: 30 CAPSULE | Refills: 5 | Status: SHIPPED | OUTPATIENT
Start: 2025-05-15

## 2025-05-15 RX ORDER — PRAVASTATIN SODIUM 20 MG/1
20 TABLET ORAL NIGHTLY
Qty: 30 TABLET | Refills: 2 | Status: SHIPPED | OUTPATIENT
Start: 2025-05-15

## 2025-05-15 RX ORDER — LINAGLIPTIN 5 MG/1
5 TABLET, FILM COATED ORAL DAILY
Qty: 30 TABLET | Refills: 3 | Status: SHIPPED | OUTPATIENT
Start: 2025-05-15

## 2025-05-15 RX ORDER — GABAPENTIN 300 MG/1
300 CAPSULE ORAL 2 TIMES DAILY
Qty: 60 CAPSULE | Refills: 2 | Status: SHIPPED | OUTPATIENT
Start: 2025-05-15

## 2025-05-15 ASSESSMENT — ENCOUNTER SYMPTOMS
POLYDIPSIA: 0
POLYPHAGIA: 0
VOMITING: 0
TREMORS: 0
DIZZINESS: 0
BRUISES/BLEEDS EASILY: 0
NUMBNESS: 0
LIMITED RANGE OF MOTION: 1
PALPITATIONS: 0
STIFFNESS: 1
WHEEZING: 0
WEAKNESS: 0
FATIGUE: 0
FREQUENCY: 0
COUGH: 0
TINGLING: 0
SORE THROAT: 0
ABDOMINAL PAIN: 0
ADENOPATHY: 0
DYSURIA: 0
DIARRHEA: 0
RHINORRHEA: 0
VISUAL CHANGE: 0
HEADACHES: 0
CONSTIPATION: 0
SHORTNESS OF BREATH: 0
HEMATURIA: 0
BLURRED VISION: 0
CHILLS: 0
FEVER: 0

## 2025-05-15 ASSESSMENT — PATIENT HEALTH QUESTIONNAIRE - PHQ9
2. FEELING DOWN, DEPRESSED OR HOPELESS: SEVERAL DAYS
SUM OF ALL RESPONSES TO PHQ9 QUESTIONS 1 AND 2: 1
10. IF YOU CHECKED OFF ANY PROBLEMS, HOW DIFFICULT HAVE THESE PROBLEMS MADE IT FOR YOU TO DO YOUR WORK, TAKE CARE OF THINGS AT HOME, OR GET ALONG WITH OTHER PEOPLE: NOT DIFFICULT AT ALL
1. LITTLE INTEREST OR PLEASURE IN DOING THINGS: NOT AT ALL

## 2025-05-15 NOTE — PROGRESS NOTES
Subjective   Patient ID: eBlla Leone is a 69 y.o. female who presents for Follow-up (Diabetes, Hypertension, Hyperlipidemia and labs) and Shoulder Pain.    Patient is here for follow-up on hypertension and hyperlipidemia. She has no chest pain, dyspnea, exertional chest pressure/discomfort, near-syncope, orthopnea, palpitations, paroxysmal nocturnal dyspnea, and syncope. Taking her medication regularly with no side effects.    Diabetes  She presents for her follow-up diabetic visit. She has type 2 diabetes mellitus. Her disease course has been stable. There are no hypoglycemic associated symptoms. Pertinent negatives for hypoglycemia include no dizziness, headaches or tremors. Pertinent negatives for diabetes include no blurred vision, no chest pain, no fatigue, no foot paresthesias, no foot ulcerations, no polydipsia, no polyphagia, no polyuria, no visual change and no weakness. Risk factors for coronary artery disease include dyslipidemia, hypertension, diabetes mellitus and obesity.   Shoulder Pain   The pain is present in the right shoulder. The current episode started more than 1 month ago. There has been no history of extremity trauma. The problem occurs constantly. The problem has been unchanged. The quality of the pain is described as sharp and aching. The pain is severe. Associated symptoms include a limited range of motion and stiffness. Pertinent negatives include no fever, numbness or tingling. The symptoms are aggravated by activity.        Review of Systems   Constitutional:  Negative for chills, fatigue and fever.   HENT:  Negative for congestion, ear pain, nosebleeds, rhinorrhea and sore throat.    Eyes:  Negative for blurred vision.   Respiratory:  Negative for cough, shortness of breath and wheezing.    Cardiovascular:  Negative for chest pain, palpitations and leg swelling.   Gastrointestinal:  Negative for abdominal pain, constipation, diarrhea and vomiting.   Endocrine: Negative for  "polydipsia, polyphagia and polyuria.   Genitourinary:  Negative for dysuria, frequency and hematuria.   Musculoskeletal:  Positive for stiffness.   Neurological:  Negative for dizziness, tingling, tremors, weakness, numbness and headaches.   Hematological:  Negative for adenopathy. Does not bruise/bleed easily.       Objective   /80   Pulse 68   Temp 36.4 °C (97.5 °F)   Resp 19   Ht 1.727 m (5' 8\")   Wt 98.4 kg (217 lb)   LMP  (LMP Unknown)   SpO2 97%   BMI 32.99 kg/m²     Physical Exam  Constitutional:       General: She is not in acute distress.     Appearance: Normal appearance.   HENT:      Head: Normocephalic and atraumatic.      Mouth/Throat:      Mouth: Mucous membranes are moist.      Pharynx: Oropharynx is clear. No oropharyngeal exudate or posterior oropharyngeal erythema.   Eyes:      General: No scleral icterus.     Extraocular Movements: Extraocular movements intact.      Pupils: Pupils are equal, round, and reactive to light.   Cardiovascular:      Rate and Rhythm: Normal rate and regular rhythm.      Pulses: Normal pulses.      Heart sounds: No murmur heard.     No friction rub. No gallop.   Pulmonary:      Effort: Pulmonary effort is normal.      Breath sounds: No wheezing, rhonchi or rales.   Musculoskeletal:      Right lower leg: No edema.      Left lower leg: No edema.   Skin:     General: Skin is warm.      Coloration: Skin is not jaundiced or pale.      Findings: No erythema or rash.   Neurological:      General: No focal deficit present.      Mental Status: She is alert and oriented to person, place, and time.      Cranial Nerves: No cranial nerve deficit.      Sensory: No sensory deficit.      Coordination: Coordination normal.      Gait: Gait normal.         Orders Only on 05/07/2025   Component Date Value Ref Range Status    GLUCOSE 05/08/2025 113 (H)  65 - 99 mg/dL Final    Comment:               Fasting reference interval     For someone without known diabetes, a glucose " value  between 100 and 125 mg/dL is consistent with  prediabetes and should be confirmed with a  follow-up test.         UREA NITROGEN (BUN) 05/08/2025 14  7 - 25 mg/dL Final    CREATININE 05/08/2025 0.65  0.50 - 1.05 mg/dL Final    EGFR 05/08/2025 96  > OR = 60 mL/min/1.73m2 Final    SODIUM 05/08/2025 144  135 - 146 mmol/L Final    POTASSIUM 05/08/2025 4.4  3.5 - 5.3 mmol/L Final    CHLORIDE 05/08/2025 105  98 - 110 mmol/L Final    CARBON DIOXIDE 05/08/2025 30  20 - 32 mmol/L Final    ELECTROLYTE BALANCE 05/08/2025 9  7 - 17 mmol/L (calc) Final    CALCIUM 05/08/2025 9.3  8.6 - 10.4 mg/dL Final    PROTEIN, TOTAL 05/08/2025 6.7  6.1 - 8.1 g/dL Final    ALBUMIN 05/08/2025 4.1  3.6 - 5.1 g/dL Final    BILIRUBIN, TOTAL 05/08/2025 0.3  0.2 - 1.2 mg/dL Final    ALKALINE PHOSPHATASE 05/08/2025 79  37 - 153 U/L Final    AST 05/08/2025 14  10 - 35 U/L Final    ALT 05/08/2025 15  6 - 29 U/L Final    WHITE BLOOD CELL COUNT 05/08/2025 7.2  3.8 - 10.8 Thousand/uL Final    RED BLOOD CELL COUNT 05/08/2025 4.68  3.80 - 5.10 Million/uL Final    HEMOGLOBIN 05/08/2025 13.7  11.7 - 15.5 g/dL Final    HEMATOCRIT 05/08/2025 42.9  35.0 - 45.0 % Final    MCV 05/08/2025 91.7  80.0 - 100.0 fL Final    MCH 05/08/2025 29.3  27.0 - 33.0 pg Final    MCHC 05/08/2025 31.9 (L)  32.0 - 36.0 g/dL Final    Comment: For adults, a slight decrease in the calculated MCHC  value (in the range of 30 to 32 g/dL) is most likely  not clinically significant; however, it should be  interpreted with caution in correlation with other  red cell parameters and the patient's clinical  condition.      RDW 05/08/2025 15.2 (H)  11.0 - 15.0 % Final    PLATELET COUNT 05/08/2025 258  140 - 400 Thousand/uL Final    MPV 05/08/2025 10.8  7.5 - 12.5 fL Final    ABSOLUTE NEUTROPHILS 05/08/2025 3,600  1,500 - 7,800 cells/uL Final    ABSOLUTE LYMPHOCYTES 05/08/2025 2,866  850 - 3,900 cells/uL Final    ABSOLUTE MONOCYTES 05/08/2025 554  200 - 950 cells/uL Final    ABSOLUTE  EOSINOPHILS 05/08/2025 151  15 - 500 cells/uL Final    ABSOLUTE BASOPHILS 05/08/2025 29  0 - 200 cells/uL Final    NEUTROPHILS 05/08/2025 50  % Final    LYMPHOCYTES 05/08/2025 39.8  % Final    MONOCYTES 05/08/2025 7.7  % Final    EOSINOPHILS 05/08/2025 2.1  % Final    BASOPHILS 05/08/2025 0.4  % Final    HEMOGLOBIN A1c 05/08/2025 6.7 (H)  <5.7 % Final    Comment: For someone without known diabetes, a hemoglobin A1c  value of 6.5% or greater indicates that they may have   diabetes and this should be confirmed with a follow-up   test.     For someone with known diabetes, a value <7% indicates   that their diabetes is well controlled and a value   greater than or equal to 7% indicates suboptimal   control. A1c targets should be individualized based on   duration of diabetes, age, comorbid conditions, and   other considerations.     Currently, no consensus exists regarding use of  hemoglobin A1c for diagnosis of diabetes for children.          eAG (mg/dL) 05/08/2025 146  mg/dL Final    eAG (mmol/L) 05/08/2025 8.1  mmol/L Final    CHOLESTEROL, TOTAL 05/08/2025 189  <200 mg/dL Final    HDL CHOLESTEROL 05/08/2025 46 (L)  > OR = 50 mg/dL Final    TRIGLYCERIDES 05/08/2025 231 (H)  <150 mg/dL Final    Comment:    If a non-fasting specimen was collected, consider  repeat triglyceride testing on a fasting specimen  if clinically indicated.   Dave et al. J. of Clin. Lipidol. 2015;9:129-169.         LDL-CHOLESTEROL 05/08/2025 108 (H)  mg/dL (calc) Final    Comment: Reference range: <100     Desirable range <100 mg/dL for primary prevention;    <70 mg/dL for patients with CHD or diabetic patients   with > or = 2 CHD risk factors.     LDL-C is now calculated using the Rahul-Yossi   calculation, which is a validated novel method providing   better accuracy than the Friedewald equation in the   estimation of LDL-C.   Rahul DIAZ et al. FELA. 2013;310(19): 3240-5355   (http://education.AnswerGo.com.in3Depth/faq/BQT515)       CHOL/HDLC RATIO 05/08/2025 4.1  <5.0 (calc) Final    NON HDL CHOLESTEROL 05/08/2025 143 (H)  <130 mg/dL (calc) Final    Comment: For patients with diabetes plus 1 major ASCVD risk   factor, treating to a non-HDL-C goal of <100 mg/dL   (LDL-C of <70 mg/dL) is considered a therapeutic   option.         Assessment/Plan   Problem List Items Addressed This Visit       Essential hypertension    Well-controlled, continue current medications and management.         Relevant Orders    Comprehensive Metabolic Panel    Follow Up In [facility] - PCP    Hemoglobin A1C    CBC and Auto Differential    Lipid Panel    Gastro-esophageal reflux disease without esophagitis    Well-controlled, continue current medications and management.         Relevant Medications    omeprazole (PriLOSEC) 40 mg DR capsule    Lumbar spondylosis with myelopathy    Stable, continue current medications and management.  Natural history and expected course discussed. Questions answered.  Educational material distributed.  Proper lifting, bending technique discussed.  Stretching exercises discussed.  Regular aerobic and trunk strengthening exercises discussed.  Ice to affected area as needed for local pain relief.  Heat to affected area as needed for local pain relief.         Relevant Medications    gabapentin (Neurontin) 300 mg capsule    Mixed hyperlipidemia    The nature of cardiac risk has been fully discussed with this patient. Discussed cardiovascular risk analysis and appropriate diet with the need for lifelong measures to reduce the risk. A regular exercise program is recommended to help achieve and maintain normal body weight, fitness and improve lipid balance. Patient education provided. They understand and agree with this course of treatment. They will return with new or worsening symptoms. Patient instructed to remain current with appropriate annual health maintenance.          Relevant Medications    pravastatin (Pravachol) 20 mg  tablet    Other Relevant Orders    Comprehensive Metabolic Panel    Follow Up In [facility] - PCP    Hemoglobin A1C    CBC and Auto Differential    Lipid Panel    Rotator cuff arthropathy of right shoulder    We will prescribe Norco to be taken as needed for severe pain and have her follow up with Ortho.          Relevant Medications    HYDROcodone-acetaminophen (Norco) 5-325 mg tablet    Type 2 diabetes mellitus without complication, without long-term current use of insulin - Primary    Diabetes Mellitus type II, under good control.  1. Rx changes: None  2. Education: Reviewed ‘ABCs’ of diabetes management (respective goals in parentheses):  A1C (<7), blood pressure (<130/80), and cholesterol (LDL <100).  3. Compliance at present is estimated to be good. Efforts to improve compliance (if necessary) will be directed at dietary modifications: and increased exercise.  4. Follow up: 3 months         Relevant Medications    linaGLIPtin (Tradjenta) 5 mg tablet    Other Relevant Orders    Comprehensive Metabolic Panel    Follow Up In [facility] - PCP    Hemoglobin A1C    CBC and Auto Differential    Lipid Panel     Scribe Attestation  By signing my name below, I, Maria Del Rosario Juan   attest that this documentation has been prepared under the direction and in the presence of Hany Leo MD.

## 2025-06-03 ENCOUNTER — LAB (OUTPATIENT)
Dept: LAB | Facility: HOSPITAL | Age: 70
End: 2025-06-03
Payer: COMMERCIAL

## 2025-06-03 ENCOUNTER — HOSPITAL ENCOUNTER (OUTPATIENT)
Dept: CARDIOLOGY | Facility: HOSPITAL | Age: 70
Discharge: HOME | End: 2025-06-03
Payer: COMMERCIAL

## 2025-06-03 DIAGNOSIS — Z01.818 PRE-OP TESTING: ICD-10-CM

## 2025-06-03 LAB
ALBUMIN SERPL BCP-MCNC: 4.2 G/DL (ref 3.4–5)
ALP SERPL-CCNC: 76 U/L (ref 33–136)
ALT SERPL W P-5'-P-CCNC: 15 U/L (ref 7–45)
ANION GAP SERPL CALC-SCNC: 12 MMOL/L (ref 10–20)
AST SERPL W P-5'-P-CCNC: 16 U/L (ref 9–39)
BASOPHILS # BLD AUTO: 0.02 X10*3/UL (ref 0–0.1)
BASOPHILS NFR BLD AUTO: 0.3 %
BILIRUB SERPL-MCNC: 0.4 MG/DL (ref 0–1.2)
BUN SERPL-MCNC: 14 MG/DL (ref 6–23)
CALCIUM SERPL-MCNC: 9.2 MG/DL (ref 8.6–10.3)
CHLORIDE SERPL-SCNC: 105 MMOL/L (ref 98–107)
CO2 SERPL-SCNC: 28 MMOL/L (ref 21–32)
CREAT SERPL-MCNC: 0.79 MG/DL (ref 0.5–1.05)
EGFRCR SERPLBLD CKD-EPI 2021: 82 ML/MIN/1.73M*2
EOSINOPHIL # BLD AUTO: 0.13 X10*3/UL (ref 0–0.7)
EOSINOPHIL NFR BLD AUTO: 2.1 %
ERYTHROCYTE [DISTWIDTH] IN BLOOD BY AUTOMATED COUNT: 14.3 % (ref 11.5–14.5)
EST. AVERAGE GLUCOSE BLD GHB EST-MCNC: 143 MG/DL
GLUCOSE SERPL-MCNC: 107 MG/DL (ref 74–99)
HBA1C MFR BLD: 6.6 % (ref ?–5.7)
HCT VFR BLD AUTO: 42.3 % (ref 36–46)
HGB BLD-MCNC: 13.5 G/DL (ref 12–16)
IMM GRANULOCYTES # BLD AUTO: 0.05 X10*3/UL (ref 0–0.7)
IMM GRANULOCYTES NFR BLD AUTO: 0.8 % (ref 0–0.9)
INR PPP: 1 (ref 0.9–1.1)
LYMPHOCYTES # BLD AUTO: 3.27 X10*3/UL (ref 1.2–4.8)
LYMPHOCYTES NFR BLD AUTO: 52.7 %
MCH RBC QN AUTO: 29.2 PG (ref 26–34)
MCHC RBC AUTO-ENTMCNC: 31.9 G/DL (ref 32–36)
MCV RBC AUTO: 92 FL (ref 80–100)
MONOCYTES # BLD AUTO: 0.57 X10*3/UL (ref 0.1–1)
MONOCYTES NFR BLD AUTO: 9.2 %
NEUTROPHILS # BLD AUTO: 2.16 X10*3/UL (ref 1.2–7.7)
NEUTROPHILS NFR BLD AUTO: 34.9 %
NRBC BLD-RTO: 0 /100 WBCS (ref 0–0)
PLATELET # BLD AUTO: 247 X10*3/UL (ref 150–450)
POTASSIUM SERPL-SCNC: 4.2 MMOL/L (ref 3.5–5.3)
PROT SERPL-MCNC: 6.8 G/DL (ref 6.4–8.2)
PROTHROMBIN TIME: 11.5 SECONDS (ref 9.8–12.4)
RBC # BLD AUTO: 4.62 X10*6/UL (ref 4–5.2)
SODIUM SERPL-SCNC: 141 MMOL/L (ref 136–145)
WBC # BLD AUTO: 6.2 X10*3/UL (ref 4.4–11.3)

## 2025-06-03 PROCEDURE — 85610 PROTHROMBIN TIME: CPT

## 2025-06-03 PROCEDURE — 83036 HEMOGLOBIN GLYCOSYLATED A1C: CPT | Mod: ELYLAB

## 2025-06-03 PROCEDURE — 84075 ASSAY ALKALINE PHOSPHATASE: CPT

## 2025-06-03 PROCEDURE — 85025 COMPLETE CBC W/AUTO DIFF WBC: CPT

## 2025-06-03 PROCEDURE — 36415 COLL VENOUS BLD VENIPUNCTURE: CPT

## 2025-06-03 PROCEDURE — 93005 ELECTROCARDIOGRAM TRACING: CPT

## 2025-06-06 LAB
ATRIAL RATE: 61 BPM
P AXIS: 33 DEGREES
P OFFSET: 206 MS
P ONSET: 145 MS
PR INTERVAL: 156 MS
Q ONSET: 223 MS
QRS COUNT: 10 BEATS
QRS DURATION: 78 MS
QT INTERVAL: 422 MS
QTC CALCULATION(BAZETT): 424 MS
QTC FREDERICIA: 424 MS
R AXIS: 25 DEGREES
T AXIS: 29 DEGREES
T OFFSET: 434 MS
VENTRICULAR RATE: 61 BPM

## 2025-06-11 ENCOUNTER — APPOINTMENT (OUTPATIENT)
Dept: CARDIOLOGY | Facility: CLINIC | Age: 70
End: 2025-06-11
Payer: COMMERCIAL

## 2025-06-11 VITALS
HEIGHT: 68 IN | WEIGHT: 217.6 LBS | SYSTOLIC BLOOD PRESSURE: 142 MMHG | BODY MASS INDEX: 32.98 KG/M2 | HEART RATE: 76 BPM | DIASTOLIC BLOOD PRESSURE: 70 MMHG

## 2025-06-11 DIAGNOSIS — E11.9 TYPE 2 DIABETES MELLITUS WITHOUT COMPLICATION, WITHOUT LONG-TERM CURRENT USE OF INSULIN: ICD-10-CM

## 2025-06-11 DIAGNOSIS — Z78.9 NEVER SMOKED ANY SUBSTANCE: ICD-10-CM

## 2025-06-11 DIAGNOSIS — I10 ESSENTIAL HYPERTENSION: ICD-10-CM

## 2025-06-11 DIAGNOSIS — E78.2 MIXED HYPERLIPIDEMIA: ICD-10-CM

## 2025-06-11 DIAGNOSIS — Z01.818 PRE-OP EXAM: ICD-10-CM

## 2025-06-11 DIAGNOSIS — Z95.818 PRESENCE OF WATCHMAN LEFT ATRIAL APPENDAGE CLOSURE DEVICE: ICD-10-CM

## 2025-06-11 DIAGNOSIS — I48.0 PAROXYSMAL ATRIAL FIBRILLATION WITH RVR (MULTI): ICD-10-CM

## 2025-06-11 PROCEDURE — 3008F BODY MASS INDEX DOCD: CPT | Performed by: INTERNAL MEDICINE

## 2025-06-11 PROCEDURE — 1036F TOBACCO NON-USER: CPT | Performed by: INTERNAL MEDICINE

## 2025-06-11 PROCEDURE — 3078F DIAST BP <80 MM HG: CPT | Performed by: INTERNAL MEDICINE

## 2025-06-11 PROCEDURE — 1159F MED LIST DOCD IN RCRD: CPT | Performed by: INTERNAL MEDICINE

## 2025-06-11 PROCEDURE — 99214 OFFICE O/P EST MOD 30 MIN: CPT | Performed by: INTERNAL MEDICINE

## 2025-06-11 PROCEDURE — 3077F SYST BP >= 140 MM HG: CPT | Performed by: INTERNAL MEDICINE

## 2025-06-11 PROCEDURE — 3044F HG A1C LEVEL LT 7.0%: CPT | Performed by: INTERNAL MEDICINE

## 2025-06-11 PROCEDURE — 4010F ACE/ARB THERAPY RXD/TAKEN: CPT | Performed by: INTERNAL MEDICINE

## 2025-06-11 ASSESSMENT — ENCOUNTER SYMPTOMS
RESPIRATORY NEGATIVE: 1
CARDIOVASCULAR NEGATIVE: 1
CONSTITUTIONAL NEGATIVE: 1
NEUROLOGICAL NEGATIVE: 1

## 2025-06-11 NOTE — PROGRESS NOTES
CARDIOLOGY OFFICE VISIT      CHIEF COMPLAINT  Chief Complaint   Patient presents with    Pre-op Clearance     POC to have right ARTHROSCOPY SHOULDER SUBACROMIAL DECOMPRESSION AND ROTATOR CUFF REPAIR on 6/27/2025 with Dr. LIVIA Mendez    Follow-up     6 month follow up on Angina pectoris, unstable (Multi)     Atrial flutter (Multi)    Essential hypertension    Mixed hyperlipidemia    Paroxysmal atrial fibrillation with RVR (Multi)    Presence of Watchman left atrial appendage closure device management        HISTORY OF PRESENT ILLNESS  Bella Leone is a 69 y.o. year old female patient with history of paroxysmal A-fib status post Watchman device, hypertension and atypical chest pain for which she had a stress test in 2022 that was normal.  However she complained of frequent chest pain and shortness of breath and had a cardiac catheterization in June 2024 that was also normal coronaries with normal LV function.  She is continue to do well with no chest pain.    She is being considered for rotator cuff surgery on the  right shoulder she is here for preop evaluation.  EKG from 6/3/2025 shows normal sinus rhythm with no acute ST or T wave changes.    ASSESSMENT AND PLAN  1.  Preop clearance: Patient is at an acceptable functional capacity of greater than 4 METS with her day-to-day activities with no symptoms at all.  As noted above she had a cardiac catheterization within the last year that was normal and EKG as noted above that is also within normal limits.  She is therefore at an acceptable low cardiac risk from the proposed rotator cuff surgery.  2.  Paroxysmal atrial fibrillation: Status post Watchman device implantation as noted above with no recurrent atrial fibrillation  3.  Hypertension: Blood pressure is well-controlled, we will continue with losartan at 25 mg daily.  4.  Dyslipidemia: With mostly increased triglyceride of 231 for which she is advised on dietary compliance.    Problem List Items Addressed This  Visit       Essential hypertension    Mixed hyperlipidemia    Paroxysmal atrial fibrillation with RVR (Multi)    Presence of Watchman left atrial appendage closure device    Type 2 diabetes mellitus without complication, without long-term current use of insulin    Never smoked any substance    BMI 33.0-33.9,adult    Pre-op exam           Past Medical History  Medical History[1]    Social History  Social History[2]    Family History   Family History[3]     Allergies:  RX Allergies[4]     Outpatient Medications:  Current Outpatient Medications   Medication Instructions    albuterol 90 mcg/actuation inhaler Inhale 2 puffs into the lungs every 6 hours as needed for Wheezing    aspirin 81 mg, oral, Daily    diphenhydrAMINE (SOMINEX) 25 mg, oral, Every 6 hours    famotidine (PEPCID) 20 mg, oral, 2 times daily    ferrous sulfate 325 (65 Fe) MG EC tablet 1 tablet, oral, Daily with breakfast, Do not crush, chew, or split.    gabapentin (NEURONTIN) 300 mg, oral, 2 times daily    HYDROcodone-acetaminophen (Norco) 5-325 mg tablet 1 tablet, oral, Every 6 hours PRN    losartan (COZAAR) 25 mg, oral, Daily    methocarbamol (Robaxin) 500 mg tablet TAKE 1 TABLET BY MOUTH IN THE EVENING AS NEEDED    omeprazole (PRILOSEC) 40 mg, oral, Daily, Swallow whole. Do not crush or chew.    OneTouch Ultra Test strip use 1 strip to check glucose once daily    pravastatin (PRAVACHOL) 20 mg, oral, Nightly    Tradjenta 5 mg, oral, Daily        Recent Lab Results:  I have personally reviewed the below noted lab results:    CBC:   Lab Results   Component Value Date    WBC 6.2 06/03/2025    WBC 7.2 05/08/2025    RBC 4.62 06/03/2025    RBC 4.68 05/08/2025    HGB 13.5 06/03/2025    HGB 13.7 05/08/2025    HCT 42.3 06/03/2025    HCT 42.9 05/08/2025     06/03/2025     05/08/2025        CMP:    Lab Results   Component Value Date     06/03/2025     05/08/2025    K 4.2 06/03/2025    K 4.4 05/08/2025     06/03/2025      05/08/2025    CO2 28 06/03/2025    CO2 30 05/08/2025    BUN 14 06/03/2025    BUN 14 05/08/2025    CREATININE 0.79 06/03/2025    CREATININE 0.65 05/08/2025    GLUCOSE 107 (H) 06/03/2025    GLUCOSE 113 (H) 05/08/2025    CALCIUM 9.2 06/03/2025    CALCIUM 9.3 05/08/2025       Magnesium:    Lab Results   Component Value Date    MG 2.00 09/10/2022       Lipid Profile:    Lab Results   Component Value Date    TRIG 231 (H) 05/08/2025    HDL 46 (L) 05/08/2025    LDLCALC 108 (H) 05/08/2025       TSH:    Lab Results   Component Value Date    TSH 1.38 08/04/2021       BNP:   Lab Results   Component Value Date    BNP 31 09/25/2022        PT/INR:    Lab Results   Component Value Date    PROTIME 11.5 06/03/2025    INR 1.0 06/03/2025       HgBA1c:    Lab Results   Component Value Date    HGBA1C 6.6 (H) 06/03/2025       BMP:  Lab Results   Component Value Date     06/03/2025     05/08/2025     01/31/2025     11/01/2024     07/22/2024    K 4.2 06/03/2025    K 4.4 05/08/2025    K 4.3 01/31/2025    K 4.2 11/01/2024    K 4.1 07/22/2024     06/03/2025     05/08/2025     01/31/2025     11/01/2024     (H) 07/22/2024    CO2 28 06/03/2025    CO2 30 05/08/2025    CO2 28 01/31/2025    CO2 29 11/01/2024    CO2 27 07/22/2024    BUN 14 06/03/2025    BUN 14 05/08/2025    BUN 12 01/31/2025    BUN 13 11/01/2024    BUN 14 07/22/2024    CREATININE 0.79 06/03/2025    CREATININE 0.65 05/08/2025    CREATININE 0.74 01/31/2025    CREATININE 0.84 11/01/2024    CREATININE 0.72 07/22/2024       CBC:  Lab Results   Component Value Date    WBC 6.2 06/03/2025    WBC 7.2 05/08/2025    WBC 5.8 01/31/2025    WBC 5.9 11/01/2024    WBC 5.8 07/22/2024    RBC 4.62 06/03/2025    RBC 4.68 05/08/2025    RBC 4.72 01/31/2025    RBC 4.90 11/01/2024    RBC 4.46 07/22/2024    HGB 13.5 06/03/2025    HGB 13.7 05/08/2025    HGB 13.1 01/31/2025    HGB 13.1 11/01/2024    HGB 11.6 (L) 07/22/2024    HCT 42.3 06/03/2025     HCT 42.9 05/08/2025    HCT 41.7 01/31/2025    HCT 43.4 11/01/2024    HCT 39.2 07/22/2024    MCV 92 06/03/2025    MCV 91.7 05/08/2025    MCV 88.3 01/31/2025    MCV 89 11/01/2024    MCV 88 07/22/2024    MCH 29.2 06/03/2025    MCH 29.3 05/08/2025    MCH 27.8 01/31/2025    MCH 26.7 11/01/2024    MCH 26.0 07/22/2024    MCHC 31.9 (L) 06/03/2025    MCHC 31.9 (L) 05/08/2025    MCHC 31.4 (L) 01/31/2025    MCHC 30.2 (L) 11/01/2024    MCHC 29.6 (L) 07/22/2024    RDW 14.3 06/03/2025    RDW 15.2 (H) 05/08/2025    RDW 14.5 01/31/2025    RDW 16.0 (H) 11/01/2024    RDW 17.9 (H) 07/22/2024     06/03/2025     05/08/2025     01/31/2025     11/01/2024     07/22/2024    MPV 10.8 05/08/2025    MPV 10.6 01/31/2025       Cardiac Enzymes:    Lab Results   Component Value Date    TROPHS 4 09/25/2022    TROPHS 5 09/25/2022    TROPHS 5 09/25/2022       Hepatic Function Panel:    Lab Results   Component Value Date    ALKPHOS 76 06/03/2025    ALKPHOS 79 05/08/2025    ALT 15 06/03/2025    ALT 15 05/08/2025    AST 16 06/03/2025    AST 14 05/08/2025    PROT 6.8 06/03/2025    PROT 6.7 05/08/2025    BILITOT 0.4 06/03/2025    BILITOT 0.3 05/08/2025         REVIEW OF SYSTEMS  Review of Systems   Constitutional: Negative.   Cardiovascular: Negative.    Respiratory: Negative.     Neurological: Negative.    All other systems reviewed and are negative.      VITALS  Vitals:    06/11/25 1009   BP: 142/70   Pulse: 76     Wt Readings from Last 4 Encounters:   06/11/25 98.7 kg (217 lb 9.6 oz)   05/15/25 98.4 kg (217 lb)   04/11/25 97 kg (213 lb 12.8 oz)   03/20/25 95.3 kg (210 lb)       PHYSICAL EXAM  Constitutional:       Appearance: Healthy appearance. Not in distress.   Eyes:      Conjunctiva/sclera: Conjunctivae normal.      Pupils: Pupils are equal, round, and reactive to light.   Neck:      Vascular: No JVR. JVD normal.   Pulmonary:      Effort: Pulmonary effort is normal.      Breath sounds: Normal breath sounds. No  wheezing. No rhonchi. No rales.   Chest:      Chest wall: Not tender to palpatation.   Cardiovascular:      PMI at left midclavicular line. Normal rate. Regular rhythm. Normal S1. Normal S2.       Murmurs:  2/6  S E Murmur RUSB      No gallop.  No click. No rub.   Pulses:     Intact distal pulses.   Edema:     Peripheral edema absent.   Abdominal:      Tenderness: There is no abdominal tenderness.   Musculoskeletal: Normal range of motion.         General: No tenderness.      Cervical back: Normal range of motion. Skin:     General: Skin is warm and dry.   Neurological:      General: No focal deficit present.      Mental Status: Alert and oriented to person, place and time.           IHenry LPN   am scribing for, and in the presence of Dr. Chemo León MD  .    Dr. Chemo RAMON MD  , personally performed the services described in the documentation as scribed by Henry [address] LPN   in my presence, and confirm it is both accurate and complete.      Dr. Chemo León MD  Thank you for allowing me to participate in the care of this patient. Please do not hesitate to contact me with any further questions or concerns.         [1]   Past Medical History:  Diagnosis Date    Personal history of other diseases of the circulatory system 06/30/2021    History of hypertension    Personal history of other diseases of the digestive system 03/08/2017    History of gastrointestinal hemorrhage    Personal history of other diseases of the digestive system 03/08/2017    History of small bowel obstruction    Personal history of other specified conditions 06/30/2021    History of chest pain    Unspecified atrial fibrillation (Multi) 11/25/2019    Atrial fibrillation with RVR   [2]   Social History  Tobacco Use    Smoking status: Never     Passive exposure: Never    Smokeless tobacco: Never   Vaping Use    Vaping status: Never Used   Substance Use Topics    Alcohol use: Yes     Comment: 3-4x a year    Drug use:  Never   [3]   Family History  Problem Relation Name Age of Onset    Hypertension Mother      Diabetes Mother      Other (cabg) Mother      Other (CARDIAC DISORDER) Mother      Other (CARDIAC PACEMAKER) Mother      Other (CEREBROVASCULAR ACCIDENT) Mother      Heart attack Mother      Kidney disease Father      Hypertension Father      Diabetes Father      Other (CARDIAC DISORDER) Father      Other (CEREBROVASCULAR ACCIDENT) Father     [4]   Allergies  Allergen Reactions    Adhesive Tape-Silicones Unknown    Atorvastatin Other     myalgia    Rosuvastatin GI Upset

## 2025-06-11 NOTE — PATIENT INSTRUCTIONS
Cleared for procedure. Hold aspirin 5-7  days as needed.  6 month visit  Continue same medications and treatments.   Patient educated on proper medication use.   Patient educated on risk factor modification.   Please bring any lab results from other providers / physicians to your next appointment.     Please bring all medicines, vitamins, and herbal supplements with you when you come to the office.     Prescriptions will not be filled unless you are compliant with your follow up appointments or have a follow up appointment scheduled as per instruction of your physician. Refills should be requested at the time of your visit.

## 2025-06-17 NOTE — PREPROCEDURE INSTRUCTIONS

## 2025-06-18 ENCOUNTER — APPOINTMENT (OUTPATIENT)
Dept: PRIMARY CARE | Facility: CLINIC | Age: 70
End: 2025-06-18
Payer: COMMERCIAL

## 2025-06-18 VITALS
OXYGEN SATURATION: 96 % | RESPIRATION RATE: 14 BRPM | HEIGHT: 68 IN | SYSTOLIC BLOOD PRESSURE: 122 MMHG | DIASTOLIC BLOOD PRESSURE: 80 MMHG | HEART RATE: 86 BPM | TEMPERATURE: 97.6 F | WEIGHT: 216 LBS | BODY MASS INDEX: 32.74 KG/M2

## 2025-06-18 DIAGNOSIS — E11.9 TYPE 2 DIABETES MELLITUS WITHOUT COMPLICATION, WITHOUT LONG-TERM CURRENT USE OF INSULIN: ICD-10-CM

## 2025-06-18 DIAGNOSIS — M12.811 ROTATOR CUFF ARTHROPATHY OF RIGHT SHOULDER: Primary | ICD-10-CM

## 2025-06-18 DIAGNOSIS — Z01.818 PREOPERATIVE CLEARANCE: ICD-10-CM

## 2025-06-18 PROCEDURE — 3008F BODY MASS INDEX DOCD: CPT | Performed by: FAMILY MEDICINE

## 2025-06-18 PROCEDURE — 3079F DIAST BP 80-89 MM HG: CPT | Performed by: FAMILY MEDICINE

## 2025-06-18 PROCEDURE — 4010F ACE/ARB THERAPY RXD/TAKEN: CPT | Performed by: FAMILY MEDICINE

## 2025-06-18 PROCEDURE — 99214 OFFICE O/P EST MOD 30 MIN: CPT | Performed by: FAMILY MEDICINE

## 2025-06-18 PROCEDURE — 3074F SYST BP LT 130 MM HG: CPT | Performed by: FAMILY MEDICINE

## 2025-06-18 PROCEDURE — 1036F TOBACCO NON-USER: CPT | Performed by: FAMILY MEDICINE

## 2025-06-18 PROCEDURE — 1159F MED LIST DOCD IN RCRD: CPT | Performed by: FAMILY MEDICINE

## 2025-06-18 PROCEDURE — 1160F RVW MEDS BY RX/DR IN RCRD: CPT | Performed by: FAMILY MEDICINE

## 2025-06-18 PROCEDURE — G2211 COMPLEX E/M VISIT ADD ON: HCPCS | Performed by: FAMILY MEDICINE

## 2025-06-18 PROCEDURE — 3044F HG A1C LEVEL LT 7.0%: CPT | Performed by: FAMILY MEDICINE

## 2025-06-18 ASSESSMENT — ENCOUNTER SYMPTOMS
FEVER: 0
VOMITING: 0
JOINT LOCKING: 0
LIMITED RANGE OF MOTION: 1
ARTHRALGIAS: 1
RHINORRHEA: 0
DIARRHEA: 0
COUGH: 0
HEMATURIA: 0
WHEEZING: 0
SHORTNESS OF BREATH: 0
NUMBNESS: 1
STIFFNESS: 1
SORE THROAT: 0
FREQUENCY: 0
INABILITY TO BEAR WEIGHT: 1
CONSTIPATION: 0
PALPITATIONS: 0
CHILLS: 0
DIZZINESS: 0
NECK PAIN: 1
ABDOMINAL PAIN: 0
TREMORS: 0
TINGLING: 1
HEADACHES: 0
DYSURIA: 0

## 2025-06-18 NOTE — PROGRESS NOTES
"Subjective   Patient ID: Bella Leone is a 69 y.o. female who presents for Pre-op Exam (PAT- RIGHT SHOULDER ROTATOR CUFF ARHTOPATHY ON 6/27/25 BY DR. MENDEZ ).    This patient was referred to me by Dr. Contreras Mendez for pre-op evaluation prior to Right Rotator Cuff Arthropathy which is scheduled for 06/27/2025 at [facility].  Patient was cleared by cardiology  on 6/11/2025.   Patient had EKG performed on 6/3/25.     Shoulder Pain   The pain is present in the right shoulder. This is a chronic problem. The current episode started more than 1 month ago. There has been no history of extremity trauma. The problem occurs constantly. The problem has been unchanged. The quality of the pain is described as sharp. The pain is at a severity of 9/10. The pain is severe. Associated symptoms include an inability to bear weight, a limited range of motion, numbness, stiffness and tingling. Pertinent negatives include no fever, itching, joint locking or joint swelling. The symptoms are aggravated by activity. She has tried oral narcotics and rest for the symptoms. The treatment provided no relief.        Review of Systems   Constitutional:  Negative for chills and fever.   HENT:  Negative for congestion, ear pain, nosebleeds, rhinorrhea and sore throat.    Respiratory:  Negative for cough, shortness of breath and wheezing.    Cardiovascular:  Negative for chest pain, palpitations and leg swelling.   Gastrointestinal:  Negative for abdominal pain, constipation, diarrhea and vomiting.   Genitourinary:  Negative for dysuria, frequency and hematuria.   Musculoskeletal:  Positive for arthralgias, neck pain and stiffness.   Skin:  Negative for itching.   Neurological:  Positive for tingling and numbness. Negative for dizziness, tremors and headaches.       Objective   /80   Pulse 86   Temp 36.4 °C (97.6 °F)   Resp 14   Ht 1.727 m (5' 8\")   Wt 98 kg (216 lb)   LMP  (LMP Unknown)   SpO2 96%   BMI 32.84 " kg/m²     Physical Exam  Constitutional:       General: She is not in acute distress.     Appearance: Normal appearance.   HENT:      Head: Normocephalic and atraumatic.      Mouth/Throat:      Mouth: Mucous membranes are moist.      Pharynx: Oropharynx is clear. No oropharyngeal exudate or posterior oropharyngeal erythema.   Eyes:      General: No scleral icterus.     Extraocular Movements: Extraocular movements intact.      Pupils: Pupils are equal, round, and reactive to light.   Cardiovascular:      Rate and Rhythm: Normal rate and regular rhythm.      Pulses: Normal pulses.      Heart sounds: No murmur heard.     No friction rub. No gallop.   Pulmonary:      Effort: Pulmonary effort is normal.      Breath sounds: No wheezing, rhonchi or rales.   Musculoskeletal:      Right lower leg: No edema.      Left lower leg: No edema.   Skin:     General: Skin is warm.      Coloration: Skin is not jaundiced or pale.      Findings: No erythema or rash.   Neurological:      General: No focal deficit present.      Mental Status: She is alert and oriented to person, place, and time.      Cranial Nerves: No cranial nerve deficit.      Sensory: No sensory deficit.      Coordination: Coordination normal.      Gait: Gait normal.         Assessment/Plan   Problem List Items Addressed This Visit       Type 2 diabetes mellitus without complication, without long-term current use of insulin    Stable based on symptoms and exam.  Continue established treatment plan and follow-up at least yearly.          Rotator cuff arthropathy of right shoulder - Primary    Continue to follow with Orthopedics.         Preoperative clearance    The patient has no medical contraindication to surgery from cardiopulmonary standpoint, we will notify the attending surgeon.  Recommend routine prophylaxis for venous thromboembolism.  We will obtain clearance form from Dr. Mendez's office.                Scribe Attestation  By signing my name below, I,  Maria Del Rosario Holloway   attest that this documentation has been prepared under the direction and in the presence of Hany Leo MD .

## 2025-06-18 NOTE — ASSESSMENT & PLAN NOTE
The patient has no medical contraindication to surgery from cardiopulmonary standpoint, we will notify the attending surgeon.  Recommend routine prophylaxis for venous thromboembolism.  We will obtain clearance form from Dr. Mendez's office.

## 2025-06-18 NOTE — Clinical Note
CHIEF COMPLAINT  Chief Complaint   Patient presents with   â¢ Follow-up     swelling, stable - sore/stiff, about the same if not a little worse, down to 2.5 mg prednisone daily - felt better on 5 mg daily   â¢ Imm/Inj     HISTORY OF PRESENT ILLNESS  72year old female here for follow-up on rheumatoid arthritis flare and swelling in the lower extremities. She was seen by cardiology yesterday who do not feel the lower extremity swelling is secondary to cardiac causes. She does note some improvement since being on the prednisone. Joint pain and stiffness has increased symptoms since decreasing to the 2.5 mg tablets, down from 5 mg previously. She was also seen in dermatology and was given triamcinolone topical cream to be applied to the lower extremities 2 times a day 3 days a week. She has been doing this possibly once a day 3 times a week, and has noted possibly some slight improvement as well. She has otherwise been feeling well. Denies any new symptoms. She does have ongoing urinary frequency, having to urinate approximately every 2 hours. She notes that this was the case after she was started on her meloxicam in the past. She wonders if this could be secondary to her body trying to get rid of the inflammation and fluid. She denies any dysuria, hematuria, difficulties urinating. MEDICATIONS    Current Outpatient Prescriptions on File Prior to Visit:  clopidogrel (PLAVIX) 75 MG tablet   triamcinolone (ARISTOCORT) 0.1 % cream   rosuvastatin (CRESTOR) 40 MG tablet   aspirin 81 MG tablet   Glucosamine 500 MG Cap   KRILL OIL PO   Ascorbic Acid (VITAMIN C) 500 MG tablet   Daily Multiple Vitamins TABS   B Complex-C (B COMPLEX-VITAMIN C) CAPS     No current facility-administered medications on file prior to visit.      ALLERGIES:   Allergen Reactions   â¢ Codeine NAUSEA and Other (See Comments)     INABILITY TO URINATE, FELT LIKE A ZOMBIE     PHYSICAL EXAM   Visit Vitals  /72   Pulse 77   Temp 98 Â°F (36.7 Â°C) Preop medical clearance "(Temporal Artery)   Resp 16   Ht 5' 3"" (1.6 m)   Wt 76.2 kg   SpO2 96%   BMI 29.76 kg/mÂ²     GENERAL: Alert and oriented, in no acute distress, appears stated age  HEENT: Normocephalic, atraumatic, conjunctiva not injected, sclera anicteric, pupils equal and round  LUNGS: Breathing comfortably. EXTREMITIES: Warm and well perfused. Swelling bilateral lower extremities decreased, though still prominent. No erythema or warmth. NEURO: Normal gait. No focal deficits noted. SKIN: Warm, dry, and without pallor. No visible rashes appreciated. PSYCH: Mood and affect appropriate. DIAGNOSTICS  Reviewed outside records including DEXA scan results with patient as she was never given these results from [facility]. ASSESSMENT and PLAN  72year old female here for the following. 1. Rheumatoid arthritis involving multiple sites with positive rheumatoid factor (CMS/LTAC, located within [facility])    2. Bilateral lower extremity edema    3. Need for vaccination      PLAN:  We discussed medication options for management of her rheumatoid arthritis. We could certainly consider starting methotrexate, however patient does have a visit scheduled with rheumatology in June and she is hesitant to start anything new that might have to be changed again at that time. We are in agreement that we will continue the prednisone 5 mg daily, utilizing 2.5 mg tabs occasionally to decrease her overall dose as able. For the lower extremity swelling, certainly if she feels symptoms are improving with the topical triamcinolone, she can continue to use this. Also recommend compression stockings to help push fluid out of the legs. Continue to monitor urinary symptoms. If symptoms worsening or not improving as the swelling is going down, would recommend further evaluation if bothersome. PREVENTATIVE CARE:   Prevnar 13 today. [address] scheduled.      DISPOSITION: Return in about 1 month (around 5/20/2018) for medicare wellness exam.    "

## 2025-06-24 ENCOUNTER — APPOINTMENT (OUTPATIENT)
Dept: CARDIOLOGY | Facility: CLINIC | Age: 70
End: 2025-06-24
Payer: COMMERCIAL

## 2025-06-26 ENCOUNTER — OFFICE VISIT (OUTPATIENT)
Dept: ORTHOPEDIC SURGERY | Facility: CLINIC | Age: 70
End: 2025-06-26
Payer: COMMERCIAL

## 2025-06-26 ENCOUNTER — ANESTHESIA EVENT (OUTPATIENT)
Dept: OPERATING ROOM | Facility: HOSPITAL | Age: 70
End: 2025-06-26
Payer: COMMERCIAL

## 2025-06-26 DIAGNOSIS — G89.18 ACUTE POSTOPERATIVE PAIN: Primary | ICD-10-CM

## 2025-06-26 DIAGNOSIS — M12.811 ROTATOR CUFF ARTHROPATHY OF RIGHT SHOULDER: ICD-10-CM

## 2025-06-26 PROCEDURE — 99212 OFFICE O/P EST SF 10 MIN: CPT | Performed by: PHYSICIAN ASSISTANT

## 2025-06-26 RX ORDER — CYCLOBENZAPRINE HCL 10 MG
10 TABLET ORAL 3 TIMES DAILY PRN
Qty: 30 TABLET | Refills: 0 | Status: SHIPPED | OUTPATIENT
Start: 2025-06-26 | End: 2025-07-06

## 2025-06-26 RX ORDER — OXYCODONE AND ACETAMINOPHEN 5; 325 MG/1; MG/1
1 TABLET ORAL EVERY 6 HOURS PRN
Qty: 28 TABLET | Refills: 0 | Status: SHIPPED | OUTPATIENT
Start: 2025-06-26 | End: 2025-07-03

## 2025-06-26 NOTE — PROGRESS NOTES
History and Physical      CHIEF COMPLAINT: Right shoulder pain and weakness    HISTORY OF PRESENT ILLNESS:      The patient is a69 y.o. female with significant past medical history of right shoulder pain and weakness.  Patient has history of previous rotator cuff repair years ago.  Diagnostic studies show recurrent tear.  Conservative therapy is providing no relief.  After risk benefits alternatives were discussed patient like to proceed right shoulder arthroscopy.      Past Medical History:  Medical History[1]     Past Surgical History:    Surgical History[2]    Medications Prior to Admission:    Medications Ordered Prior to Encounter[3]     Allergies:  Adhesive tape-silicones, Atorvastatin, and Rosuvastatin    Social History:   Social History     Socioeconomic History    Marital status:      Spouse name: Not on file    Number of children: Not on file    Years of education: Not on file    Highest education level: Not on file   Occupational History    Not on file   Tobacco Use    Smoking status: Never     Passive exposure: Never    Smokeless tobacco: Never   Vaping Use    Vaping status: Never Used   Substance and Sexual Activity    Alcohol use: Yes     Comment: 3-4x a year    Drug use: Never    Sexual activity: Defer     Comment: HYSTER   Other Topics Concern    Not on file   Social History Narrative    Not on file     Social Drivers of Health     Financial Resource Strain: Not on file   Food Insecurity: Not on file   Transportation Needs: Not on file   Physical Activity: Not on file   Stress: Not on file   Social Connections: Not on file   Intimate Partner Violence: Not on file   Housing Stability: Not on file       Family History:   Family History[4]     Review of systems: Noncontributory for orthopedics    Vitals:  LMP  (LMP Unknown)     Physical examination:  Head normocephalic atraumatic  Heart regular rate and rhythm  Lungs clear  Abdominal exam nontender nondistended  Extremity: Right shoulder  patient currently has forward flexion of 40 degrees abduction of 30 degrees external rotation of 40 degrees internal rotation posterior iliac crest    Impression : Right shoulder rotator cuff tear      Plan:  Scheduled for right shoulder arthroscopy rotator cuff repair    Risk and benefits of surgery discussed extensively with the patient.    Surgical risk included but were not limited to infection, wear, loosening, need for further surgery blood clot, failure to heal, failure of the surgery, stiffness, loss of limb life, extremity function change in length change, and associated risks of surgery during the coronavirus epidemic.    Risk with any surgery including arthroplasty and replacements include but are not limited to:       Wear, loosening, infection, blood clot, DVT, loss of limb, life, delayed recovery, limb length change, instability, dislocation, discomfort with new implant and failure of the procedure.       [1]   Past Medical History:  Diagnosis Date    A-fib (Multi)     Anemia     Arrhythmia     Arthritis     COPD (chronic obstructive pulmonary disease) (Multi)     GERD (gastroesophageal reflux disease)     GI (gastrointestinal bleed)     History of blood transfusion     Hx of atrial flutter     Hyperlipidemia     Hypertension     Nephrolithiasis     Personal history of other diseases of the circulatory system 06/30/2021    History of hypertension    Personal history of other diseases of the digestive system 03/08/2017    History of gastrointestinal hemorrhage    Personal history of other diseases of the digestive system 03/08/2017    History of small bowel obstruction    Personal history of other specified conditions 06/30/2021    History of chest pain    PONV (postoperative nausea and vomiting)     Presence of Watchman left atrial appendage closure device     Type 2 diabetes mellitus     Unspecified atrial fibrillation (Multi) 11/25/2019    Atrial fibrillation with RVR    Urinary tract infection     [2]   Past Surgical History:  Procedure Laterality Date    APPENDECTOMY  03/08/2017    Appendectomy    BLADDER SUSPENSION      CARDIAC ASSIST DEVICE INSERTION  12/22/2022    Atrial appendage closure device insertion    CARDIAC CATHETERIZATION N/A 05/10/2024    Procedure: Left Heart Cath, With LV;  Surgeon: Israel Richardson MD;  Location: [facility];  Service: Cardiovascular;  Laterality: N/A;  possible PCI    COLONOSCOPY      CT ABDOMEN PELVIS ANGIOGRAM W AND/OR WO IV CONTRAST  09/07/2022    CT ABDOMEN PELVIS ANGIOGRAM W AND/OR WO IV CONTRAST 9/7/2022 [facility] CL[facility]ICAL LEGAC[facility]    CYSTOSCOPY W/ LASER LITHOTRIPSY      HYSTERECTOMY  03/08/2017    Hysterectomy    MULTIPLE TOOTH EXTRACTIONS      ROTATOR CUFF REPAIR Right 03/08/2017    Rotator Cuff Repair    TOTAL HIP ARTHROPLASTY Bilateral 01/23/2021    Hip replacement    TUBAL LIGATION      UPPER GASTROINTESTINAL ENDOSCOPY      URETHRA SURGERY      dilation   [3]   Current Outpatient Medications on File Prior to Visit   Medication Sig Dispense Refill    albuterol 90 mcg/actuation inhaler Inhale 2 puffs into the lungs every 6 hours as needed for Wheezing      aspirin 81 mg chewable tablet Chew 1 tablet (81 mg) once daily. 90 tablet 2    famotidine (Pepcid) 20 mg tablet Take 1 tablet (20 mg) by mouth 2 times a day for 5 days. 10 tablet 0    ferrous sulfate 325 (65 Fe) MG EC tablet Take 1 tablet by mouth once daily with breakfast. Do not crush, chew, or split. 30 tablet 3    gabapentin (Neurontin) 300 mg capsule Take 1 capsule (300 mg) by mouth 2 times a day. 60 capsule 2    linaGLIPtin (Tradjenta) 5 mg tablet Take 1 tablet (5 mg) by mouth once daily. 30 tablet 3    losartan (Cozaar) 25 mg tablet Take 1 tablet (25 mg) by mouth once daily. 90 tablet 3    omeprazole (PriLOSEC) 40 mg DR capsule Take 1 capsule (40 mg) by mouth once daily. Swallow whole. Do not crush or chew. 30 capsule 5    OneTouch Ultra Test strip use 1 strip to check glucose once daily 100 each 3     pravastatin (Pravachol) 20 mg tablet Take 1 tablet (20 mg) by mouth once daily at bedtime. 30 tablet 2     No current facility-administered medications on file prior to visit.   [4]   Family History  Problem Relation Name Age of Onset    Hypertension Mother      Diabetes Mother      Other (cabg) Mother      Other (CARDIAC DISORDER) Mother      Other (CARDIAC PACEMAKER) Mother      Other (CEREBROVASCULAR ACCIDENT) Mother      Heart attack Mother      Kidney disease Father      Hypertension Father      Diabetes Father      Other (CARDIAC DISORDER) Father      Other (CEREBROVASCULAR ACCIDENT) Father

## 2025-06-26 NOTE — H&P (VIEW-ONLY)
History and Physical      CHIEF COMPLAINT: Right shoulder pain and weakness    HISTORY OF PRESENT ILLNESS:      The patient is a69 y.o. female with significant past medical history of right shoulder pain and weakness.  Patient has history of previous rotator cuff repair years ago.  Diagnostic studies show recurrent tear.  Conservative therapy is providing no relief.  After risk benefits alternatives were discussed patient like to proceed right shoulder arthroscopy.      Past Medical History:  Medical History[1]     Past Surgical History:    Surgical History[2]    Medications Prior to Admission:    Medications Ordered Prior to Encounter[3]     Allergies:  Adhesive tape-silicones, Atorvastatin, and Rosuvastatin    Social History:   Social History     Socioeconomic History    Marital status:      Spouse name: Not on file    Number of children: Not on file    Years of education: Not on file    Highest education level: Not on file   Occupational History    Not on file   Tobacco Use    Smoking status: Never     Passive exposure: Never    Smokeless tobacco: Never   Vaping Use    Vaping status: Never Used   Substance and Sexual Activity    Alcohol use: Yes     Comment: 3-4x a year    Drug use: Never    Sexual activity: Defer     Comment: HYSTER   Other Topics Concern    Not on file   Social History Narrative    Not on file     Social Drivers of Health     Financial Resource Strain: Not on file   Food Insecurity: Not on file   Transportation Needs: Not on file   Physical Activity: Not on file   Stress: Not on file   Social Connections: Not on file   Intimate Partner Violence: Not on file   Housing Stability: Not on file       Family History:   Family History[4]     Review of systems: Noncontributory for orthopedics    Vitals:  LMP  (LMP Unknown)     Physical examination:  Head normocephalic atraumatic  Heart regular rate and rhythm  Lungs clear  Abdominal exam nontender nondistended  Extremity: Right shoulder  patient currently has forward flexion of 40 degrees abduction of 30 degrees external rotation of 40 degrees internal rotation posterior iliac crest    Impression : Right shoulder rotator cuff tear      Plan:  Scheduled for right shoulder arthroscopy rotator cuff repair    Risk and benefits of surgery discussed extensively with the patient.    Surgical risk included but were not limited to infection, wear, loosening, need for further surgery blood clot, failure to heal, failure of the surgery, stiffness, loss of limb life, extremity function change in length change, and associated risks of surgery during the coronavirus epidemic.    Risk with any surgery including arthroplasty and replacements include but are not limited to:       Wear, loosening, infection, blood clot, DVT, loss of limb, life, delayed recovery, limb length change, instability, dislocation, discomfort with new implant and failure of the procedure.       [1]   Past Medical History:  Diagnosis Date    A-fib (Multi)     Anemia     Arrhythmia     Arthritis     COPD (chronic obstructive pulmonary disease) (Multi)     GERD (gastroesophageal reflux disease)     GI (gastrointestinal bleed)     History of blood transfusion     Hx of atrial flutter     Hyperlipidemia     Hypertension     Nephrolithiasis     Personal history of other diseases of the circulatory system 06/30/2021    History of hypertension    Personal history of other diseases of the digestive system 03/08/2017    History of gastrointestinal hemorrhage    Personal history of other diseases of the digestive system 03/08/2017    History of small bowel obstruction    Personal history of other specified conditions 06/30/2021    History of chest pain    PONV (postoperative nausea and vomiting)     Presence of Watchman left atrial appendage closure device     Type 2 diabetes mellitus     Unspecified atrial fibrillation (Multi) 11/25/2019    Atrial fibrillation with RVR    Urinary tract infection     [2]   Past Surgical History:  Procedure Laterality Date    APPENDECTOMY  03/08/2017    Appendectomy    BLADDER SUSPENSION      CARDIAC ASSIST DEVICE INSERTION  12/22/2022    Atrial appendage closure device insertion    CARDIAC CATHETERIZATION N/A 05/10/2024    Procedure: Left Heart Cath, With LV;  Surgeon: Israel Richardson MD;  Location: [facility];  Service: Cardiovascular;  Laterality: N/A;  possible PCI    COLONOSCOPY      CT ABDOMEN PELVIS ANGIOGRAM W AND/OR WO IV CONTRAST  09/07/2022    CT ABDOMEN PELVIS ANGIOGRAM W AND/OR WO IV CONTRAST 9/7/2022 [facility] CLINICAL LEGACY    CYSTOSCOPY W/ LASER LITHOTRIPSY      HYSTERECTOMY  03/08/2017    Hysterectomy    MULTIPLE TOOTH EXTRACTIONS      ROTATOR CUFF REPAIR Right 03/08/2017    Rotator Cuff Repair    TOTAL HIP ARTHROPLASTY Bilateral 01/23/2021    Hip replacement    TUBAL LIGATION      UPPER GASTROINTESTINAL ENDOSCOPY      URETHRA SURGERY      dilation   [3]   Current Outpatient Medications on File Prior to Visit   Medication Sig Dispense Refill    albuterol 90 mcg/actuation inhaler Inhale 2 puffs into the lungs every 6 hours as needed for Wheezing      aspirin 81 mg chewable tablet Chew 1 tablet (81 mg) once daily. 90 tablet 2    famotidine (Pepcid) 20 mg tablet Take 1 tablet (20 mg) by mouth 2 times a day for 5 days. 10 tablet 0    ferrous sulfate 325 (65 Fe) MG EC tablet Take 1 tablet by mouth once daily with breakfast. Do not crush, chew, or split. 30 tablet 3    gabapentin (Neurontin) 300 mg capsule Take 1 capsule (300 mg) by mouth 2 times a day. 60 capsule 2    linaGLIPtin (Tradjenta) 5 mg tablet Take 1 tablet (5 mg) by mouth once daily. 30 tablet 3    losartan (Cozaar) 25 mg tablet Take 1 tablet (25 mg) by mouth once daily. 90 tablet 3    omeprazole (PriLOSEC) 40 mg DR capsule Take 1 capsule (40 mg) by mouth once daily. Swallow whole. Do not crush or chew. 30 capsule 5    OneTouch Ultra Test strip use 1 strip to check glucose once daily 100 each 3     pravastatin (Pravachol) 20 mg tablet Take 1 tablet (20 mg) by mouth once daily at bedtime. 30 tablet 2     No current facility-administered medications on file prior to visit.   [4]   Family History  Problem Relation Name Age of Onset    Hypertension Mother      Diabetes Mother      Other (cabg) Mother      Other (CARDIAC DISORDER) Mother      Other (CARDIAC PACEMAKER) Mother      Other (CEREBROVASCULAR ACCIDENT) Mother      Heart attack Mother      Kidney disease Father      Hypertension Father      Diabetes Father      Other (CARDIAC DISORDER) Father      Other (CEREBROVASCULAR ACCIDENT) Father

## 2025-06-27 ENCOUNTER — HOSPITAL ENCOUNTER (OUTPATIENT)
Facility: HOSPITAL | Age: 70
Setting detail: OUTPATIENT SURGERY
Discharge: HOME | End: 2025-06-27
Attending: ORTHOPAEDIC SURGERY | Admitting: ORTHOPAEDIC SURGERY
Payer: COMMERCIAL

## 2025-06-27 ENCOUNTER — ANESTHESIA (OUTPATIENT)
Dept: OPERATING ROOM | Facility: HOSPITAL | Age: 70
End: 2025-06-27
Payer: COMMERCIAL

## 2025-06-27 VITALS
HEART RATE: 61 BPM | BODY MASS INDEX: 32.64 KG/M2 | HEIGHT: 68 IN | SYSTOLIC BLOOD PRESSURE: 128 MMHG | RESPIRATION RATE: 18 BRPM | WEIGHT: 215.39 LBS | TEMPERATURE: 97 F | OXYGEN SATURATION: 95 % | DIASTOLIC BLOOD PRESSURE: 66 MMHG

## 2025-06-27 DIAGNOSIS — S46.111A LABRAL TEAR OF LONG HEAD OF BICEPS TENDON, RIGHT, INITIAL ENCOUNTER: ICD-10-CM

## 2025-06-27 DIAGNOSIS — S46.811A PARTIAL TEAR OF RIGHT SUBSCAPULARIS TENDON, INITIAL ENCOUNTER: ICD-10-CM

## 2025-06-27 DIAGNOSIS — M75.01 ADHESIVE CAPSULITIS OF RIGHT SHOULDER: ICD-10-CM

## 2025-06-27 DIAGNOSIS — M19.011 OSTEOARTHRITIS OF RIGHT GLENOHUMERAL JOINT: ICD-10-CM

## 2025-06-27 DIAGNOSIS — M25.811 IMPINGEMENT OF RIGHT SHOULDER: ICD-10-CM

## 2025-06-27 DIAGNOSIS — M75.101 ROTATOR CUFF TEAR, NON-TRAUMATIC, RIGHT: Primary | ICD-10-CM

## 2025-06-27 DIAGNOSIS — S43.431A DEGENERATIVE SUPERIOR LABRAL ANTERIOR-TO-POSTERIOR (SLAP) TEAR OF RIGHT SHOULDER: ICD-10-CM

## 2025-06-27 LAB — GLUCOSE BLD MANUAL STRIP-MCNC: 127 MG/DL (ref 74–99)

## 2025-06-27 PROCEDURE — 7100000001 HC RECOVERY ROOM TIME - INITIAL BASE CHARGE: Performed by: ORTHOPAEDIC SURGERY

## 2025-06-27 PROCEDURE — 3600000003 HC OR TIME - INITIAL BASE CHARGE - PROCEDURE LEVEL THREE: Performed by: ORTHOPAEDIC SURGERY

## 2025-06-27 PROCEDURE — 29823 SHO ARTHRS SRG XTNSV DBRDMT: CPT | Performed by: PHYSICIAN ASSISTANT

## 2025-06-27 PROCEDURE — 82947 ASSAY GLUCOSE BLOOD QUANT: CPT

## 2025-06-27 PROCEDURE — 7100000002 HC RECOVERY ROOM TIME - EACH INCREMENTAL 1 MINUTE: Performed by: ORTHOPAEDIC SURGERY

## 2025-06-27 PROCEDURE — 2500000004 HC RX 250 GENERAL PHARMACY W/ HCPCS (ALT 636 FOR OP/ED): Performed by: ANESTHESIOLOGY

## 2025-06-27 PROCEDURE — 7100000010 HC PHASE TWO TIME - EACH INCREMENTAL 1 MINUTE: Performed by: ORTHOPAEDIC SURGERY

## 2025-06-27 PROCEDURE — 7100000009 HC PHASE TWO TIME - INITIAL BASE CHARGE: Performed by: ORTHOPAEDIC SURGERY

## 2025-06-27 PROCEDURE — 3600000008 HC OR TIME - EACH INCREMENTAL 1 MINUTE - PROCEDURE LEVEL THREE: Performed by: ORTHOPAEDIC SURGERY

## 2025-06-27 PROCEDURE — 2500000004 HC RX 250 GENERAL PHARMACY W/ HCPCS (ALT 636 FOR OP/ED): Performed by: ORTHOPAEDIC SURGERY

## 2025-06-27 PROCEDURE — 29826 SHO ARTHRS SRG DECOMPRESSION: CPT | Performed by: PHYSICIAN ASSISTANT

## 2025-06-27 PROCEDURE — 2500000005 HC RX 250 GENERAL PHARMACY W/O HCPCS: Performed by: ORTHOPAEDIC SURGERY

## 2025-06-27 PROCEDURE — 29823 SHO ARTHRS SRG XTNSV DBRDMT: CPT | Performed by: ORTHOPAEDIC SURGERY

## 2025-06-27 PROCEDURE — 29827 SHO ARTHRS SRG RT8TR CUF RPR: CPT | Performed by: ORTHOPAEDIC SURGERY

## 2025-06-27 PROCEDURE — 29826 SHO ARTHRS SRG DECOMPRESSION: CPT | Performed by: ORTHOPAEDIC SURGERY

## 2025-06-27 PROCEDURE — 3700000002 HC GENERAL ANESTHESIA TIME - EACH INCREMENTAL 1 MINUTE: Performed by: ORTHOPAEDIC SURGERY

## 2025-06-27 PROCEDURE — 29827 SHO ARTHRS SRG RT8TR CUF RPR: CPT | Performed by: PHYSICIAN ASSISTANT

## 2025-06-27 PROCEDURE — 2500000004 HC RX 250 GENERAL PHARMACY W/ HCPCS (ALT 636 FOR OP/ED)

## 2025-06-27 PROCEDURE — 3700000001 HC GENERAL ANESTHESIA TIME - INITIAL BASE CHARGE: Performed by: ORTHOPAEDIC SURGERY

## 2025-06-27 PROCEDURE — C1713 ANCHOR/SCREW BN/BN,TIS/BN: HCPCS | Performed by: ORTHOPAEDIC SURGERY

## 2025-06-27 PROCEDURE — 23700 MNPJ ANES SHO JT FIXJ APRATS: CPT | Performed by: ORTHOPAEDIC SURGERY

## 2025-06-27 PROCEDURE — 2720000007 HC OR 272 NO HCPCS: Performed by: ORTHOPAEDIC SURGERY

## 2025-06-27 PROCEDURE — 2780000003 HC OR 278 NO HCPCS: Performed by: ORTHOPAEDIC SURGERY

## 2025-06-27 DEVICE — ANCHOR, BIOCOMPOSITE CORKSCREW FT, 5.5 X 14.7MM, W/TWO 1.3 SUTURE TAPE: Type: IMPLANTABLE DEVICE | Site: SHOULDER | Status: FUNCTIONAL

## 2025-06-27 RX ORDER — METOPROLOL TARTRATE 1 MG/ML
5 INJECTION, SOLUTION INTRAVENOUS ONCE
Status: COMPLETED | OUTPATIENT
Start: 2025-06-27 | End: 2025-06-27

## 2025-06-27 RX ORDER — SODIUM CHLORIDE, SODIUM LACTATE, POTASSIUM CHLORIDE, CALCIUM CHLORIDE 600; 310; 30; 20 MG/100ML; MG/100ML; MG/100ML; MG/100ML
100 INJECTION, SOLUTION INTRAVENOUS CONTINUOUS
Status: DISCONTINUED | OUTPATIENT
Start: 2025-06-27 | End: 2025-06-27 | Stop reason: HOSPADM

## 2025-06-27 RX ORDER — DIPHENHYDRAMINE HYDROCHLORIDE 50 MG/ML
INJECTION, SOLUTION INTRAMUSCULAR; INTRAVENOUS AS NEEDED
Status: DISCONTINUED | OUTPATIENT
Start: 2025-06-27 | End: 2025-06-27

## 2025-06-27 RX ORDER — FENTANYL CITRATE 50 UG/ML
INJECTION, SOLUTION INTRAMUSCULAR; INTRAVENOUS AS NEEDED
Status: DISCONTINUED | OUTPATIENT
Start: 2025-06-27 | End: 2025-06-27

## 2025-06-27 RX ORDER — PROPOFOL 10 MG/ML
INJECTION, EMULSION INTRAVENOUS AS NEEDED
Status: DISCONTINUED | OUTPATIENT
Start: 2025-06-27 | End: 2025-06-27

## 2025-06-27 RX ORDER — OXYCODONE HYDROCHLORIDE 5 MG/1
10 TABLET ORAL EVERY 4 HOURS PRN
Status: DISCONTINUED | OUTPATIENT
Start: 2025-06-27 | End: 2025-06-27 | Stop reason: HOSPADM

## 2025-06-27 RX ORDER — MIDAZOLAM HYDROCHLORIDE 1 MG/ML
INJECTION, SOLUTION INTRAMUSCULAR; INTRAVENOUS
Status: COMPLETED | OUTPATIENT
Start: 2025-06-27 | End: 2025-06-27

## 2025-06-27 RX ORDER — SODIUM CHLORIDE, SODIUM LACTATE, POTASSIUM CHLORIDE, CALCIUM CHLORIDE 600; 310; 30; 20 MG/100ML; MG/100ML; MG/100ML; MG/100ML
50 INJECTION, SOLUTION INTRAVENOUS CONTINUOUS
Status: DISCONTINUED | OUTPATIENT
Start: 2025-06-27 | End: 2025-06-27 | Stop reason: HOSPADM

## 2025-06-27 RX ORDER — ONDANSETRON 4 MG/1
4 TABLET, FILM COATED ORAL EVERY 8 HOURS PRN
Status: CANCELLED | OUTPATIENT
Start: 2025-06-27

## 2025-06-27 RX ORDER — FENTANYL CITRATE 50 UG/ML
25 INJECTION, SOLUTION INTRAMUSCULAR; INTRAVENOUS EVERY 5 MIN PRN
Status: DISCONTINUED | OUTPATIENT
Start: 2025-06-27 | End: 2025-06-27 | Stop reason: HOSPADM

## 2025-06-27 RX ORDER — ALBUTEROL SULFATE 0.83 MG/ML
2.5 SOLUTION RESPIRATORY (INHALATION) ONCE AS NEEDED
Status: DISCONTINUED | OUTPATIENT
Start: 2025-06-27 | End: 2025-06-27 | Stop reason: HOSPADM

## 2025-06-27 RX ORDER — CEFAZOLIN SODIUM 2 G/50ML
2 SOLUTION INTRAVENOUS ONCE
Status: COMPLETED | OUTPATIENT
Start: 2025-06-27 | End: 2025-06-27

## 2025-06-27 RX ORDER — SODIUM CHLORIDE 0.9 G/100ML
INJECTION, SOLUTION IRRIGATION AS NEEDED
Status: DISCONTINUED | OUTPATIENT
Start: 2025-06-27 | End: 2025-06-27 | Stop reason: HOSPADM

## 2025-06-27 RX ORDER — OXYCODONE HYDROCHLORIDE 5 MG/1
5 TABLET ORAL EVERY 4 HOURS PRN
Status: DISCONTINUED | OUTPATIENT
Start: 2025-06-27 | End: 2025-06-27 | Stop reason: HOSPADM

## 2025-06-27 RX ORDER — FAMOTIDINE 10 MG/ML
INJECTION INTRAVENOUS AS NEEDED
Status: DISCONTINUED | OUTPATIENT
Start: 2025-06-27 | End: 2025-06-27

## 2025-06-27 RX ORDER — ACETAMINOPHEN 325 MG/1
650 TABLET ORAL EVERY 4 HOURS PRN
Status: DISCONTINUED | OUTPATIENT
Start: 2025-06-27 | End: 2025-06-27 | Stop reason: HOSPADM

## 2025-06-27 RX ORDER — LIDOCAINE HYDROCHLORIDE 20 MG/ML
INJECTION, SOLUTION INFILTRATION; PERINEURAL AS NEEDED
Status: DISCONTINUED | OUTPATIENT
Start: 2025-06-27 | End: 2025-06-27

## 2025-06-27 RX ORDER — OXYCODONE AND ACETAMINOPHEN 5; 325 MG/1; MG/1
1 TABLET ORAL EVERY 6 HOURS PRN
Refills: 0 | Status: CANCELLED | OUTPATIENT
Start: 2025-06-27

## 2025-06-27 RX ORDER — DIPHENHYDRAMINE HYDROCHLORIDE 50 MG/ML
12.5 INJECTION, SOLUTION INTRAMUSCULAR; INTRAVENOUS ONCE AS NEEDED
Status: DISCONTINUED | OUTPATIENT
Start: 2025-06-27 | End: 2025-06-27 | Stop reason: HOSPADM

## 2025-06-27 RX ORDER — SCOPOLAMINE 1 MG/3D
PATCH, EXTENDED RELEASE TRANSDERMAL AS NEEDED
Status: DISCONTINUED | OUTPATIENT
Start: 2025-06-27 | End: 2025-06-27

## 2025-06-27 RX ORDER — ROCURONIUM BROMIDE 10 MG/ML
INJECTION, SOLUTION INTRAVENOUS AS NEEDED
Status: DISCONTINUED | OUTPATIENT
Start: 2025-06-27 | End: 2025-06-27

## 2025-06-27 RX ORDER — LIDOCAINE HYDROCHLORIDE 10 MG/ML
0.1 INJECTION, SOLUTION EPIDURAL; INFILTRATION; INTRACAUDAL; PERINEURAL ONCE
Status: DISCONTINUED | OUTPATIENT
Start: 2025-06-27 | End: 2025-06-27 | Stop reason: HOSPADM

## 2025-06-27 RX ORDER — MEPERIDINE HYDROCHLORIDE 25 MG/ML
12.5 INJECTION INTRAMUSCULAR; INTRAVENOUS; SUBCUTANEOUS EVERY 10 MIN PRN
Status: DISCONTINUED | OUTPATIENT
Start: 2025-06-27 | End: 2025-06-27 | Stop reason: HOSPADM

## 2025-06-27 RX ORDER — ONDANSETRON HYDROCHLORIDE 2 MG/ML
4 INJECTION, SOLUTION INTRAVENOUS ONCE AS NEEDED
Status: DISCONTINUED | OUTPATIENT
Start: 2025-06-27 | End: 2025-06-27 | Stop reason: HOSPADM

## 2025-06-27 RX ORDER — ONDANSETRON HYDROCHLORIDE 2 MG/ML
INJECTION, SOLUTION INTRAVENOUS AS NEEDED
Status: DISCONTINUED | OUTPATIENT
Start: 2025-06-27 | End: 2025-06-27

## 2025-06-27 RX ADMIN — FENTANYL CITRATE 50 MCG: 50 INJECTION, SOLUTION INTRAMUSCULAR; INTRAVENOUS at 08:48

## 2025-06-27 RX ADMIN — PROPOFOL 150 MG: 10 INJECTION, EMULSION INTRAVENOUS at 08:48

## 2025-06-27 RX ADMIN — LIDOCAINE HYDROCHLORIDE 100 MG: 20 INJECTION, SOLUTION INFILTRATION; PERINEURAL at 08:48

## 2025-06-27 RX ADMIN — MEPERIDINE HYDROCHLORIDE 12.5 MG: 25 INJECTION INTRAMUSCULAR; INTRAVENOUS; SUBCUTANEOUS at 10:20

## 2025-06-27 RX ADMIN — FENTANYL CITRATE 25 MCG: 50 INJECTION, SOLUTION INTRAMUSCULAR; INTRAVENOUS at 09:15

## 2025-06-27 RX ADMIN — METOPROLOL TARTRATE 5 MG: 5 INJECTION INTRAVENOUS at 10:20

## 2025-06-27 RX ADMIN — ROCURONIUM BROMIDE 70 MG: 10 SOLUTION INTRAVENOUS at 08:48

## 2025-06-27 RX ADMIN — ONDANSETRON 4 MG: 2 INJECTION, SOLUTION INTRAMUSCULAR; INTRAVENOUS at 09:28

## 2025-06-27 RX ADMIN — SUGAMMADEX 200 MG: 100 INJECTION, SOLUTION INTRAVENOUS at 09:44

## 2025-06-27 RX ADMIN — MIDAZOLAM 2 MG: 1 INJECTION INTRAMUSCULAR; INTRAVENOUS at 08:29

## 2025-06-27 RX ADMIN — FENTANYL CITRATE 25 MCG: 50 INJECTION, SOLUTION INTRAMUSCULAR; INTRAVENOUS at 09:24

## 2025-06-27 RX ADMIN — CEFAZOLIN SODIUM 2 G: 2 SOLUTION INTRAVENOUS at 08:56

## 2025-06-27 RX ADMIN — FAMOTIDINE 20 MG: 10 INJECTION, SOLUTION INTRAVENOUS at 09:10

## 2025-06-27 RX ADMIN — DIPHENHYDRAMINE HYDROCHLORIDE 12.5 MG: 50 INJECTION INTRAMUSCULAR; INTRAVENOUS at 09:10

## 2025-06-27 RX ADMIN — DEXAMETHASONE SODIUM PHOSPHATE: 10 INJECTION, SOLUTION INTRAMUSCULAR; INTRAVENOUS at 08:29

## 2025-06-27 RX ADMIN — DEXAMETHASONE SODIUM PHOSPHATE 4 MG: 4 INJECTION, SOLUTION INTRAMUSCULAR; INTRAVENOUS at 09:10

## 2025-06-27 RX ADMIN — SODIUM CHLORIDE, SODIUM LACTATE, POTASSIUM CHLORIDE, AND CALCIUM CHLORIDE 50 ML/HR: .6; .31; .03; .02 INJECTION, SOLUTION INTRAVENOUS at 07:48

## 2025-06-27 SDOH — HEALTH STABILITY: MENTAL HEALTH: CURRENT SMOKER: 0

## 2025-06-27 ASSESSMENT — PAIN SCALES - GENERAL
PAINLEVEL_OUTOF10: 4
PAINLEVEL_OUTOF10: 1
PAINLEVEL_OUTOF10: 3
PAINLEVEL_OUTOF10: 0 - NO PAIN
PAIN_LEVEL: 0
PAINLEVEL_OUTOF10: 4
PAINLEVEL_OUTOF10: 0 - NO PAIN
PAINLEVEL_OUTOF10: 4
PAINLEVEL_OUTOF10: 3
PAINLEVEL_OUTOF10: 4

## 2025-06-27 ASSESSMENT — PAIN - FUNCTIONAL ASSESSMENT
PAIN_FUNCTIONAL_ASSESSMENT: 0-10
PAIN_FUNCTIONAL_ASSESSMENT: UNABLE TO SELF-REPORT
PAIN_FUNCTIONAL_ASSESSMENT: 0-10
PAIN_FUNCTIONAL_ASSESSMENT: 0-10

## 2025-06-27 ASSESSMENT — COLUMBIA-SUICIDE SEVERITY RATING SCALE - C-SSRS
2. HAVE YOU ACTUALLY HAD ANY THOUGHTS OF KILLING YOURSELF?: NO
6. HAVE YOU EVER DONE ANYTHING, STARTED TO DO ANYTHING, OR PREPARED TO DO ANYTHING TO END YOUR LIFE?: NO
1. IN THE PAST MONTH, HAVE YOU WISHED YOU WERE DEAD OR WISHED YOU COULD GO TO SLEEP AND NOT WAKE UP?: NO

## 2025-06-27 ASSESSMENT — PAIN DESCRIPTION - DESCRIPTORS
DESCRIPTORS: SHARP
DESCRIPTORS: ACHING

## 2025-06-27 NOTE — ANESTHESIA PROCEDURE NOTES
Airway  Date/Time: 6/27/2025 8:52 AM  Reason: elective    Airway not difficult    Staffing  Performed: MARIZA   Authorized by: Natali Gardner MD    Performed by: Jomar Gordon RN  Patient location during procedure: OR    Patient Condition  Indications for airway management: anesthesia  Patient position: sniffing  Planned trial extubation  Sedation level: deep     Final Airway Details   Preoxygenated: yes  Final airway type: endotracheal airway  Successful airway: ETT  Cuffed: yes   Successful intubation technique: direct laryngoscopy  Adjuncts used in placement: intubating stylet and cricoid pressure  Endotracheal tube insertion site: oral  Blade: Singh  Blade size: #3  ETT size (mm): 7.0  Cormack-Lehane Classification: grade IIa - partial view of glottis  Placement verified by: capnometry   Measured from: lips  ETT to lips (cm): 22  Number of attempts at approach: 1

## 2025-06-27 NOTE — ANESTHESIA POSTPROCEDURE EVALUATION
Patient: Bella Leone    Procedure Summary       Date: 06/27/25 Room / Location: [facility] OR 11 / [facility]    Anesthesia Start: 0842 Anesthesia Stop: 0955    Procedure: ARTHROSCOPY SHOULDER SUBACROMIAL DECOMPRESSION AND ROTATOR CUFF REPAIR, EXTENSIVE DEBRIDEMENT CUFF, LABRUM AND BICEPS, ARTHRITIS AND ADHESIVE CAPSULTITS, NAKIA (Right: Shoulder) Diagnosis:       Rotator cuff tear, non-traumatic, right      Partial tear of right subscapularis tendon, initial encounter      Degenerative superior labral anterior-to-posterior (SLAP) tear of right shoulder      Labral tear of long head of biceps tendon, right, initial encounter      Osteoarthritis of right glenohumeral joint      Impingement of right shoulder      Adhesive capsulitis of right shoulder      (Complete rotator cuff tear or rupture of right shoulder, not specified as traumatic [M75.121])    Surgeons: Contreras Mendez MD Responsible Provider: Natali Gardner MD    Anesthesia Type: general ASA Status: 3            Anesthesia Type: general    Vitals Value Taken Time   /70 06/27/25 09:52   Temp 36.2 06/27/25 09:55   Pulse 76 06/27/25 09:55   Resp 15 06/27/25 09:55   SpO2 97 % 06/27/25 09:53   Vitals shown include unfiled device data.    Anesthesia Post Evaluation    Patient location during evaluation: bedside  Patient participation: complete - patient participated  Level of consciousness: awake and alert  Pain score: 0  Pain management: adequate  Airway patency: patent  Cardiovascular status: acceptable and stable  Respiratory status: acceptable and face mask  Hydration status: stable  Postoperative Nausea and Vomiting: none        No notable events documented.

## 2025-06-27 NOTE — DISCHARGE INSTRUCTIONS
Medication given may have significant effects after discharge. Therefore on the day of surgery:  1) you must be accompanied by a responsible adult upon discharge and for 24 hours after surgery.  Do not drive a motor vehicle, operate machinery, power tools or appliance, drink alcoholic beverages, or make critical decisions for 24 hours  2) Be aware of dizziness, which may cause a fall. Change positions slowly.  3) Eating: you may resume your regular diet but it is better to increase intake slowly with mild foods and working up to your regular diet. No greasy, fried or spicy foods today.  4) Nausea/Vomiting: Nausea and vomiting may occur as you become more active or begin to increase food intake. If this should happen, decrease activity and return to liquids. If the problem persists, call your surgeon  5) Pain: Your surgeon may have given you a prescription for pain medication. Take pain medication with food as prescribed. Pain medication may cause constipation, so drink plenty of fluids. If your pain medication does not provide adequate relief, call your surgeon  6) Urinating: Notify your surgeon if you have not urinated within 12 hours after discharge  7) Ice: Apply ice to operative site for 20 min 5-6 times a day or use Polar care as instructed  8) Dressing:   [x]   Remove dressing in 24hr    []  Remove dressing in 48hr   [x]  Leave open to air after initial dressing is taken off and incision is dry then okay to shower  Do not remove the steri-strips. (no bath/ hot tubs/ pools)   []  Leave dressing in place. Keep dressing/ incision clean and dry    9) Activity    Shoulder/ elbow/Hand   [x]  Elevate extremity    [x]  Sling   [x] at all times (except for exercises and showering)  [] as needed only for comfort   [] Begin daily motion exercises out of sling as instructed   [x]  Bend and flex fingers/wrist/elbow frequently   [] other   Knee/ Ankle/ Foot   [] elevate extremity   [] crutches        [] non-weight bearing  to operative extremity  [] partial weight bearing  [] Full weight bearing as tolerated   []  Use brace whenever walking   [] other      10) Begin physical therapy if advised by you physician:   [] before returning to see you doctor   [x] not until you follow up with your doctor    11) call your doctor at [phone] for an appointment (or follow up as scheduled)    Contact [facility] office if  Increased redness, swelling, drainage of any kind, and/or pain to surgery site.  As well as new onset fevers and or chills.  These could signify an infection.  Calf or thigh tenderness to touch as well as increased swelling or redness.  This could signify a clot formation.  Numbness or tingling to an area around the incision site or below the incision site (toes). Or if the operative extremity becomes cold, blue.  Any rash appears, increased  or new onset nausea/vomiting occur.  This may indicate a reaction to a medication.  Temp is 38.5 C (101F)  12) If you have any concerns or questions, please call [facility] surgeon on call. The 24- hour phone is [phone]  13) If you are unable to contact your surgeon, in an emergency situation, go to the nearest hospital    General Anesthesia Discharge Instructions    About this topic  You may need general anesthesia if you need to be asleep during a procedure. Your doctor will use drugs to block the signals that go from your nerves to your brain. Doctors give general anesthesia during a surgery or procedure to:  Allow you to sleep  Help your body be still  Relax your muscles  Help you to relax and be pain free  Keep you from remembering the surgery  Let the doctor manage your airway, breathing, and blood flow  The doctor or nurse anesthetist gives general anesthesia by a shot into your vein. Sometimes, you may breathe in a gas through a mask placed over your face.  What care is needed at home?  Ask your doctor what you need to do when you go home. Make sure  you ask questions if you do not understand what the doctor says.  Your doctor may give you drugs to prevent or treat an upset stomach from the anesthetic. Take them as ordered.  If your throat is sore, suck on ice chips or popsicles to ease throat pain.  Put 2 to 3 pillows under your head and back when you lie down to help you breathe easier.  For the first 24 to 48 hours:  Do not operate heavy or dangerous machinery.  Do not make major decisions or sign important papers. You may not be able to think clearly.  Avoid beer, wine, or mixed drinks.  You are at a higher risk of falling for at least 24 hours after general anesthesia.  Take extra care when you get up.  Do not change positions quickly.  Do not rush when you need to go to the bathroom or to answer the phone.  Ask for help if you feel unsteady when you try to walk.  Wear shoes with non-slip soles and low heels.  What follow-up care is needed?  Your doctor may ask you to come back to the office to check on your progress. Be sure to keep these visits.  If you have stitches that do not dissolve or staples, you will need to have them removed. Your doctor will want to do this in 1 to 2 weeks. If the doctor used skin glue, the glue will fall off on its own.  What drugs may be needed?  The doctor may order drugs to:  Help with pain  Treat an upset stomach or throwing up  Will physical activity be limited?  You will not be allowed to drive right away after the procedure. Ask a family member or a friend to drive you home.  Avoid trying to get out of bed without help until you are sure of your balance.  You may have to limit your activity. Talk to your doctor about if you need to limit how much you lift or limit exercise after your procedure.  What changes to diet are needed?  Start with a light diet when you are fully awake. This includes things that are easy to swallow like soups, pudding, jello, toast, and eggs. Slowly progress to your normal diet.  What problems  could happen?  Low blood pressure  Breathing problems  Upset stomach or throwing up  Dizziness  Blood clots  Infection  When do I need to call the doctor?  Trouble breathing  Upset stomach or throwing up more than 3 times in the next 2 days  Dizziness  Teach Back: Helping You Understand  The Teach Back Method helps you understand the information we are giving you. After you talk with the staff, tell them in your own words what you learned. This helps to make sure the staff has described each thing clearly. It also helps to explain things that may have been confusing. Before going home, make sure you can do these:  I can tell you about my procedure.  I can tell you if I need to follow up with my doctor.  I can tell you what is good for me to eat and drink the next day.  I can tell you what I would do if I have trouble breathing, an upset stomach, or dizziness.  Where can I learn more?  [employer]  https://www.nigms.nih.gov/education/pages/factsheet_Anesthesia.aspx  NHS Choices  http://www.nhs.uk/conditions/Anaesthetic-general/Pages/Definition.aspx  Last Reviewed Date    Nerve Blocks    Why is this procedure done?  Nerve blocks can help to manage pain. By giving you a drug into an exact group of nerves, your doctor may be able to block pain to a specific part of your body. Some nerve blocks are used after surgery, especially if you have had an abdominal surgery. Nerve blocks are used before surgery to lessen the need for opioid drugs during and after surgery.  There are a few kinds of nerve blocks. Some nerve blocks are used to:  Treat pain. These may have a pain drug and a drug to help with swelling.  Find where your pain is coming from. This kind of nerve block will have a pain drug that lasts for only a certain amount of time.  See if another kind of treatment like surgery will help your pain.  Prevent pain during or after a procedure.  Help you avoid surgery.  Give relief of  pain during and after surgery.  Lessen the use of opioid drugs needed for pain after surgery.  Block the pain in an area of the body during surgery as anesthesia.  What will the results be?  The nerve block may help to treat or ease your pain. The area may be numb. You may have some pain relief right away. You may be able to use fewer pain medicines after surgery. It also may be easier for you to move around after surgery. Some nerve blocks go away within a few hours. Others give you pain relief for a day or so to a few months or longer. Some nerve blocks can take a few days to work fully.  What happens before the procedure?  Your doctor will ask you about your health history. Talk to the doctor about:  All the drugs you are taking. Be sure to include all prescription, over the counter, and herbal supplements. Tell the doctor if you have any drug allergy. Bring a list of drugs you take with you.  Any bleeding problems. Be sure to tell your doctor if you are taking any drugs that may cause bleeding. Some of these are warfarin, rivaroxaban, apixaban, ticagrelor, clopidogrel, ibuprofen, naproxen, or aspirin. Certain vitamins and herbs, such as garlic and fish oil, may also add to the risk for bleeding. You may need to stop these drugs as well. Talk to your doctor about them.  What happens during the procedure?  Sometimes, the doctor will give you a special drug to make you sleepy for the nerve block. Other times, you are completely awake. You may also have a nerve block as a part of your surgery.  The doctor will position you in a way to give them easy access to where you will be having the nerve block.  The doctor will clean the area and give you a local numbing drug. The doctor will use a long thin needle to give you the nerve block. Often, the doctor will use a special x-ray, ultrasound, or CT scan to make sure the needle is in the right place. The doctor will inject the drug close to the nerve that is causing your  pain. Sometimes they inject the drug in an area that will block pain from a few nerves.  The doctor will take out the needle and place a clean bandage on your skin.  Sometimes, the doctor may leave a catheter in place to deliver medicine over 1 to 2 days. You may have to return to your doctor's office to have the catheter removed.  The procedure takes 15 to 30 minutes.  What happens after the procedure?  If you are not having surgery, you will be able to go home the same day. You may be asked to rest for 15 to 30 minutes. If the doctor gives you a special drug to make you sleepy for the procedure, you will need someone to drive you home.  What care is needed at home?  You will be allowed to shower or take a bath later that same day unless you have a catheter still in place.  You may need other medicines to help with pain as your nerve block wears off.  What follow-up care is needed?  As your body absorbs the drugs, your pain may come back. Talk to your doctor about if you need another nerve block and how often you can have a nerve block.  What problems could happen?  Infection  Bleeding or bruising  Pain at the injection site  Raised blood sugar  Rash  Itching  Numbness  Nerve injury  Allergic reaction  Puncture or laceration of an organ like the liver, spleen. or bowel  Numbing medicine injected into a blood vessel  Where can I learn more?  [employer]est[employer]  https://www.marizol.com/patient-information/regional-anesthesia  NHS  https://www.nhs.uk/conditions/epidural/  NHS  https://www.nhs.uk/conditions/local-anaesthesia/  [employer] [place]  http://www.radiologyinfo.org/en/info.cfm?pg=nerveblock  Last Reviewed Date  2020-04-22

## 2025-06-27 NOTE — ANESTHESIA PROCEDURE NOTES
Peripheral Block    Patient location during procedure: holding area  Medication administered at: 6/27/2025 8:29 AM  Reason for block: at surgeon's request and post-op pain management  Staffing  Performed: attending   Authorized by: Natali Gardner MD    Performed by: Natali Gardner MD  Preanesthetic Checklist  Completed: patient identified, IV checked, site marked, risks and benefits discussed, surgical consent, monitors and equipment checked, pre-op evaluation and timeout performed   Timeout performed at: 6/27/2025 8:29 AM  Peripheral Block  Patient position: laying flat  Prep: ChloraPrep  Patient monitoring: heart rate, cardiac monitor and continuous pulse ox  Block type: supraclavicular  Laterality: right  Injection technique: single-shot  Guidance: nerve stimulator and ultrasound guided  Infiltration strength: 1 %  Dose: 4 mL  Needle  Needle gauge: 21 G  Needle length: 5 cm  Needle localization: ultrasound guidance     image stored in chart  Needle insertion depth: 4 cm  Test dose: negative  Assessment  Injection assessment: negative aspiration for heme, no paresthesia on injection, incremental injection and local visualized surrounding nerve on ultrasound  Paresthesia pain: none  Heart rate change: no  Slow fractionated injection: yes  Additional Notes  30 Ccs 0.5% ropiviciane + Epi 1:200,000 + 10 mg Dexamethasone PF  Medications Administered  midazolam (VERSED) IV - intravenous   2 mg - 6/27/2025 8:29:00 AM

## 2025-06-27 NOTE — ANESTHESIA PREPROCEDURE EVALUATION
Patient: Bella Leone    Procedure Information       Date/Time: 06/27/25 0836    Procedure: ARTHROSCOPY SHOULDER SUBACROMIAL DECOMPRESSION AND ROTATOR CUFF REPAIR-v (Right: Shoulder) - DIABETIC, TAPE    Location: [facility] / [facility]    Surgeons: Contreras Mendez MD            Relevant Problems   Cardiac   (+) Angina pectoris, unstable (Multi)   (+) Atrial flutter (Multi)   (+) Chest pain   (+) Essential hypertension   (+) Mixed hyperlipidemia   (+) Paroxysmal atrial fibrillation with RVR (Multi)      Pulmonary   (+) COPD exacerbation (Multi)   (+) Community acquired pneumonia   (+) Dyspnea on minimal exertion      Neuro   (+) Generalized anxiety disorder   (+) Lumbar radiculopathy   (+) Moderate episode of recurrent major depressive disorder   (+) Sciatica      GI   (+) Acute GI bleeding   (+) Gastro-esophageal reflux disease without esophagitis   (+) Lower gastrointestinal bleeding      /Renal   (+) Acute UTI   (+) Recurrent UTI   (+) Recurrent nephrolithiasis      Endocrine   (+) Obesity   (+) Type 2 diabetes mellitus without complication, without long-term current use of insulin      Hematology   (+) Anemia due to blood loss   (+) Chronic anemia   (+) Macrocytic anemia      Musculoskeletal   (+) Chronic low back pain   (+) Primary osteoarthritis of both hips   (+) Spinal stenosis of lumbar region with neurogenic claudication      ID   (+) Acute UTI   (+) Community acquired pneumonia   (+) Recurrent UTI       Clinical information reviewed:   Tobacco  Allergies  Meds  Problems  Med Hx  Surg Hx   Fam Hx  Soc   Hx        NPO Detail:  No data recorded     Physical Exam    Airway  Mallampati: II  TM distance: >3 FB  Neck ROM: full  Mouth opening: 3 or more finger widths     Cardiovascular - normal exam   Dental    Pulmonary - normal exam   Abdominal - normal exam           Anesthesia Plan    History of general anesthesia?: yes  History of complications of general anesthesia?: no    ASA 3      general   (Brachial Plexus Block for Postop Pain Management)  The patient is not a current smoker.  Patient was previously instructed to abstain from smoking on day of procedure.  Patient did not smoke on day of procedure.    intravenous induction   Anesthetic plan and risks discussed with patient.    Plan discussed with CRNA and attending.

## 2025-06-27 NOTE — OP NOTE
ARTHROSCOPY SHOULDER SUBACROMIAL DECOMPRESSION AND ROTATOR CUFF REPAIR, EXTENSIVE DEBRIDEMENT CUFF, LABRUM AND BICEPS, ARTHRITIS AND ADHESIVE CAPSULTITS, NAKIA (R) Operative Note  Preop diagnosis: Right shoulder recurrent rotator cuff tear    Postop diagnosis:  Right shoulder supraspinatus recurrent rotator cuff tear  Right shoulder partial undersurface subscapularis tear  Right shoulder long head bicep base tear  Right shoulder SLAP or labral tear  Right shoulder osteoarthritis of the glenohumeral joint  Right shoulder impingement  Right shoulder adhesive capsulitis    Assistant: Contreras Delgado PA-C was required and present throughout the entire case.    Given the nature of the disease process and the procedure, a skilled surgical first assistant was necessary during the entire case.  The assistant was necessary to retract soft tissue throughout the procedure, providing optimal exposure to the surgical site.    They aided with manipulating and stabilizing the patient's extremity or extremities throughout the case.  They also assisted in maintaining hemostasis during the procedure.    Towards the end of the procedure, I broke scrub for clinical documentation and dictation while the physician assistant completed layered closure of the operative wound with dressing application and patient transportation.    A certified scrub tech was also present at the back table managing instruments and supplies for the surgical case.  A qualified resident was not available for this procedure.    Date: 2025  OR Location: [facility]    Name: Bella Leone, : 1955, Age: 69 y.o., MRN: [id number], Sex: female    Diagnosis  Pre-op Diagnosis      * Complete rotator cuff tear or rupture of right shoulder, not specified as traumatic [M75.121] Post-op Diagnosis     * Rotator cuff tear, non-traumatic, right [M75.101]     * Partial tear of right subscapularis tendon, initial encounter [P97.564F]     *  Degenerative superior labral anterior-to-posterior (SLAP) tear of right shoulder [S43.431A]     * Labral tear of long head of biceps tendon, right, initial encounter [S46.111A]     * Osteoarthritis of right glenohumeral joint [M19.011]     * Impingement of right shoulder [M25.811]     * Adhesive capsulitis of right shoulder [M75.01]     Procedures  Right shoulder manipulation under anesthesia 10763    Right shoulder arthroscopic extensive debridement of partial undersurface subscapularis tear, labral tear, long head bicep base tear, osteoarthritis of the glenohumeral joint 34263    Shoulder arthroscopic subacromial decompression 28771    Right shoulder arthroscopic rotator cuff repair 94999      Surgeons      * Contreras Mendez - Primary    Resident/Fellow/Other Assistant:  Surgeons and Role:  * No surgeons found with a matching role *    Staff:   Circulator: Isi Garcia Person: Jodie    Anesthesia Staff: Anesthesiologist: Natali Gardner MD  CRNA: EDISON Gan-CRNA  SRNA: Jomar Gordon RN    Procedure Summary  Anesthesia: General  ASA: III  Estimated Blood Loss: Minimal mL  Intra-op Medications:   Administrations occurring from 0836 to 1006 on 06/27/25:   Medication Name Total Dose   sodium chloride 0.9 % irrigation solution 9,000 mL   dexAMETHasone (Decadron) 4 mg/mL 4 mg   diphenhydrAMINE 50 mg/mL 12.5 mg   famotidine (Pepcid) 10 mg/mL 20 mg   fentaNYL PF 0.05 mg/mL 100 mcg   lidocaine (Xylocaine) injection 2 % 100 mg   ondansetron 2 mg/mL 4 mg   propofol (Diprivan) injection 10 mg/mL 150 mg   rocuronium (ZeMuron) 50 mg/5 mL injection 70 mg   ceFAZolin (Ancef) 2 g in dextrose (iso) IV 50 mL 2 g              Anesthesia Record               Intraprocedure I/O Totals       None           Specimen: No specimens collected              Drains and/or Catheters: * None in log *    Tourniquet Times:         Implants:  Implants       Type Name Action Serial No.      Screw ANCHOR, BIOCOMPOSITE CORKSCREW  FT, 5.5 X 14.7MM, W/TWO 1.3 SUTURE TAPE - [id number] Implanted               Findings: see procedure details    Indications: Bella Leone is an 69 y.o. female who is having surgery for Complete rotator cuff tear or rupture of right shoulder, not specified as traumatic [M75.121].     The patient was seen in the preoperative area. The risks, benefits, complications, treatment options, non-operative alternatives, expected recovery and outcomes were discussed with the patient. The possibilities of reaction to medication, pulmonary aspiration, injury to surrounding structures, bleeding, recurrent infection, the need for additional procedures, failure to diagnose a condition, and creating a complication requiring transfusion or operation were discussed with the patient. The patient concurred with the proposed plan, giving informed consent.  The site of surgery was properly noted/marked if necessary per policy. The patient has been actively warmed in preoperative area. Preoperative antibiotics have been ordered and given within 1 hours of incision.    Procedure Details:   Surgical risk included but were not limited to infection, wear, loosening, need for further surgery blood clot, failure to heal, failure of the surgery, stiffness, loss of limb life, extremity function change in length change, and associated risks of surgery during the coronavirus epidemic.      Patient brought to operating  at [facility].  Patient had induction of anesthesia.  Patient was placed in the lateral position.        Patient was examined under anesthesia in the lateral position was found to have active motion.  A manipulation under anesthesia was performed with audible crepitus regaining full motion          Standard prep and drape completed and  the arm was placed in routine shoulder traction.  Arthroscopy portals were established  posterior to anterior with the aid of a switching stick and we began our examination of the  joint.    The bicep  base and labrum was frayed superiorly and anteriorly.  It was arthroscopically smoothed and debrided.  This left behind residual stable labral rim and bicep anchor attachment.  The bicep was intact as it exited the joint at the interval.  Once the debridement of the bicep and labrum was completed it was photodocumented.       There was grade 3 changes of arthritic change over the humeral head posteriorly.  This was smoothed and debrided.  The glenoid was uninvolved.  This was photodocumented after debridement was completed.    The posterior cuff was intact.  The anterior cuff was partial undersurface tearing over a 1 x 2 cm area anterior to the bicep exit involving the subscapularis tendon.  This was debrided and smoothed.  The remaining tendon was intact as we estimated the tendon depth of tear was less than 10 to 20%.       The superior cuff had rotator cuff fraying and tearing that appear to communicate with the bursal space.  This was debrided cleaned and marked with a marker suture to be identified in the some bursal space.  Debridement the cuff tear appeared to be full-thickness was less than 1 cm with minimal retraction.        The axillary pouch was also adhesed slightly.  Arthroscopic debridement of the adhesions and release was completed.    This allowed for full photodocumentation of the glenohumeral joint.    this completed our photodocumentation and inspection of the glenohumeral joint.    The arm was repositioned to reestablish our portals were established a lateral partial all in the soft bursal space.    A subbursal space bursectomy was carried out.  This was completed by placing the camera laterally and with the bur posteriorly we swept from back to front completing a decompression of an approximate 13 mm osteophyte off the undersurface of the acromion.      With the decompression completed we complete inspection and visualization of the bursal sided cuff tear which was easily  full-thickness approximately 1 cm with minimal retraction.      Percutaneously,  with the camera now posteriorly, we placed a 5.5 mm bio suture corkscrew anchor with good purchase in the greater tuberosity.  The double loaded FiberWire tapes/sutures that were in the anchor were placed in a complex mattress fashion around the leading edge of the rotator cuff tear and then arthroscopically tied completing a firm repair of the rotator cuff to the greater tuberosity.  Final photodocumentation was completing showing the firm stable solid repair.     The bursal space was then drained of all fluid and loose material the portals were closed with simple nylon's compressive dressing sling was applied the patient was taken to recovery room.              Complications:  None; patient tolerated the procedure well.    Disposition: PACU - hemodynamically stable.  Condition: stable         Contreras Mendez  Phone Number: [phone]

## 2025-07-02 ENCOUNTER — OFFICE VISIT (OUTPATIENT)
Dept: ORTHOPEDIC SURGERY | Facility: CLINIC | Age: 70
End: 2025-07-02
Payer: COMMERCIAL

## 2025-07-02 DIAGNOSIS — M12.811 ROTATOR CUFF ARTHROPATHY OF RIGHT SHOULDER: Primary | ICD-10-CM

## 2025-07-02 PROCEDURE — 99212 OFFICE O/P EST SF 10 MIN: CPT | Performed by: PHYSICIAN ASSISTANT

## 2025-07-02 NOTE — PROGRESS NOTES
History of present illness status post right shoulder arthroscopy extensive debridement subacromial decompression rotator cuff repair.  Patient presents in sling.  Mainly some night soreness but states that she already feels better than prior to surgery.      Physical exam:      General: No acute distress or breathing difficulty or discomfort, pleasant and cooperative with the examination.    Extremities: Right shoulder incisions are clean dry and intact sutures removed new Steri-Strips were placed.  There is no drainage redness or evidence of infection.  She can move hand wrist and elbow but reports some numbness in her right thumb index and middle finger      Impression: Right shoulder status post SAD/RCR    Plan: Patient will remain in the sling for 6 weeks before starting physical therapy.  She will follow-up in 3 weeks for wound check and physical therapy prescription

## 2025-07-14 ENCOUNTER — TELEPHONE (OUTPATIENT)
Dept: ORTHOPEDIC SURGERY | Facility: CLINIC | Age: 70
End: 2025-07-14
Payer: COMMERCIAL

## 2025-07-14 DIAGNOSIS — M12.811 ROTATOR CUFF ARTHROPATHY OF RIGHT SHOULDER: ICD-10-CM

## 2025-07-14 RX ORDER — OXYCODONE AND ACETAMINOPHEN 5; 325 MG/1; MG/1
1 TABLET ORAL EVERY 6 HOURS PRN
Qty: 28 TABLET | Refills: 0 | Status: SHIPPED | OUTPATIENT
Start: 2025-07-14 | End: 2025-07-21

## 2025-07-21 ENCOUNTER — APPOINTMENT (OUTPATIENT)
Dept: ORTHOPEDIC SURGERY | Facility: CLINIC | Age: 70
End: 2025-07-21
Payer: COMMERCIAL

## 2025-07-21 DIAGNOSIS — M12.811 ROTATOR CUFF ARTHROPATHY OF RIGHT SHOULDER: ICD-10-CM

## 2025-07-21 PROCEDURE — 4010F ACE/ARB THERAPY RXD/TAKEN: CPT | Performed by: ORTHOPAEDIC SURGERY

## 2025-07-21 PROCEDURE — 99024 POSTOP FOLLOW-UP VISIT: CPT | Performed by: ORTHOPAEDIC SURGERY

## 2025-07-21 NOTE — PROGRESS NOTES
History of present illness patient is status post right shoulder arthroscopy subacromial decompression rotator cuff repair.  She presents in sling.  She reports some nighttime soreness.      Physical exam:      General: No acute distress or breathing difficulty or discomfort, pleasant and cooperative with the examination.    Extremities: Right shoulder incisions are clean dry and intact.  Neurovascularly intact hand wrist and elbow      Diagnostic studies: X-ray outlet view taken today of the right shoulder shows smooth subacromial arch    Impression: Status post right shoulder arthroscopy subacromial decompression rotator cuff repair    Plan: Patient will remain in the sling we downsized it when she is up and about until 8/8/2025.  She will then discontinue sling and start physical therapy.  Physical therapy order was provided she will perform this closer to [place].  She will follow-up in 3 to 4 weeks for range of motion check.

## 2025-07-24 ENCOUNTER — EVALUATION (OUTPATIENT)
Dept: PHYSICAL THERAPY | Facility: CLINIC | Age: 70
End: 2025-07-24
Payer: COMMERCIAL

## 2025-07-24 DIAGNOSIS — M12.811 ROTATOR CUFF ARTHROPATHY OF RIGHT SHOULDER: Primary | ICD-10-CM

## 2025-07-24 PROCEDURE — 97161 PT EVAL LOW COMPLEX 20 MIN: CPT | Mod: GP

## 2025-07-24 ASSESSMENT — ENCOUNTER SYMPTOMS
LOSS OF SENSATION IN FEET: 0
DEPRESSION: 0
OCCASIONAL FEELINGS OF UNSTEADINESS: 0

## 2025-07-24 ASSESSMENT — ACTIVITIES OF DAILY LIVING (ADL): EFFECT OF PAIN ON DAILY ACTIVITIES: SEE GOALS

## 2025-07-24 ASSESSMENT — PAIN SCALES - GENERAL: PAINLEVEL_OUTOF10: 5 - MODERATE PAIN

## 2025-07-24 ASSESSMENT — PAIN - FUNCTIONAL ASSESSMENT: PAIN_FUNCTIONAL_ASSESSMENT: 0-10

## 2025-07-24 NOTE — PROGRESS NOTES
Physical Therapy Evaluation and Treatment      Patient Name: Bella Leone  MRN: [id number]  Today's Date: 2025  : 1955  Contreras Mendez  Time Calculation  Start Time: 07  Stop Time: 0750  Time Calculation (min): 46 min    PT Evaluation Time Entry  PT Evaluation (Low) Time Entry: 45                             Assessment:  Rehab Prognosis: Good  Barriers to Participation:  (none)    Plan:  Treatment/Interventions: Aquatic therapy, Cryotherapy, Dry needling, Education/ Instruction, Hot pack, Manual therapy, Self care/ home management, Therapeutic exercises, Other (comment) (IASTM/cupping)  PT Plan: Skilled PT  Duration: 6wks  Onset Date: 25  Rehab Potential: Good  Plan of Care Agreement: Patient    Current Problem:   1. Rotator cuff arthropathy of right shoulder  Follow Up In Physical Therapy          Subjective    General:  General  Reason for Referral: RTC arthropathy R  Referred By: Andrea  Past Medical History Relevant to Rehab: status post right shoulder arthroscopy extensive debridement subacromial decompression rotator cuff repair.-  C/C sx*/Max sx level*:5/10 R shldr  KAI/DOI/Work*?:  status post right shoulder arthroscopy extensive debridement subacromial decompression rotator cuff repair.  done   ADL makes worse*?:movement  ADL makes better?:rest  PLOF:Unlimited job/home tasks performed-NO: housework:   (see goals)  Testing*:-----    Physical Findings*: Limited: AROM (R shldr  )                                                Strength ( R shldr )                                               PROM (R shldr  )    POC:  Ongoing clinic PT to include:continue with ROM/PRE per DR protocol (in box);  STW PRN;  no resistance until     Other: (copay*?- no ) (fall risk*?- no  ) (ins visit limit*?- no  )     Precautions:  Precautions  STEADI Fall Risk Score (The score of 4 or more indicates an increased risk of falling): 0  Precautions Comment: no resistance until ; A-fib  (controlled); BPPV; COPD; L lateral epic; R neck pain; RUE N+T    Pain:  Pain Assessment  Pain Assessment: 0-10  0-10 (Numeric) Pain Score: 5 - Moderate pain  Pain Location: Shoulder  Pain Orientation: Right  Pain Onset:  (6/27 RTC surg)  Effect of Pain on Daily Activities: see goals    Prior Level of Function:  Prior Function Per Pt/Caregiver Report  Vocational: Retired    Objective     Outcome Measures:  Other Measures  Disability of Arm Shoulder Hand (DASH): 34     Treatments:  POC:  Ongoing clinic PT to include:continue with ROM/PRE per DR protocol (in box);  STW PRN; no resistance until 8/29  OP EDUCATION:  Reminded the patient to wear the sling as advised  She will bring in a family member for home PROM    Goals:  Active       PT Problem       PT Goal 1       Start:  07/24/25    Expected End:  10/22/25       1. Independent HEP to allow for 50% reduction in max ADL C/C sx ( 5/10) 3-4wks         PT Goal 2       Start:  07/24/25    Expected End:  10/22/25       2. 0/10 night time sx to allow for uninterrupted sleep x 1wk ( 1/7) 3-4wks         PT Goal 3       Start:  07/24/25    Expected End:  10/22/25       3. Survey score improvement from 52% to 40% (QDI-per DR protocol)         PT Goal 4       Start:  07/24/25    Expected End:  10/22/25       4. ROM increase to allow for improved ADL reaching (per protocol)         PT Goal 5       Start:  07/24/25    Expected End:  10/22/25       5. Strength increase to allow for improved ADL object handling (per Dr protocol)              Shoulder        Shoulder Observation  Shoulder Observation Comment: the patient arrived to without sling; RUE in the dependent position        Shoulder AROM WFL unless documented below  R Shoulder flexion: (180°): NT  L Shoulder flexion: (180°): 145  Shoulder PROM WFL unless documented below  R shoulder flexion: (180°): 120  L shoulder flexion: (180°): 165  R shoulder ER: (90°): 55  L shoulder ER: (90°): 90  R shoulder IR: (70°): WFL  L  shoulder IR: (70°): 80  Upper Extremity Strength:  MMT 5/5 max  RIGHT LEFT   Shldr Abduction    NT/5 5/5   Shldr ER    NT/5 5/5   Shldr IR    NT/5 5/5

## 2025-07-28 ENCOUNTER — TREATMENT (OUTPATIENT)
Dept: PHYSICAL THERAPY | Facility: CLINIC | Age: 70
End: 2025-07-28
Payer: COMMERCIAL

## 2025-07-28 DIAGNOSIS — M12.811 ROTATOR CUFF ARTHROPATHY OF RIGHT SHOULDER: Primary | ICD-10-CM

## 2025-07-28 PROCEDURE — 97140 MANUAL THERAPY 1/> REGIONS: CPT | Mod: GP

## 2025-07-28 ASSESSMENT — PAIN - FUNCTIONAL ASSESSMENT: PAIN_FUNCTIONAL_ASSESSMENT: 0-10

## 2025-07-28 ASSESSMENT — PAIN SCALES - GENERAL: PAINLEVEL_OUTOF10: 0 - NO PAIN

## 2025-07-28 NOTE — PROGRESS NOTES
Physical Therapy Treatment    Patient Name: Bella Leone  MRN: [id number]  : 1955  Today's Date: 2025  Contreras Mendez  Time Calculation  Start Time: 705  Stop Time: 730  Time Calculation (min): 25 min      PT Therapeutic Procedures Time Entry  Manual Therapy Time Entry: 23                         General:  General  Reason for Referral: RTC arthropathy R  Referred By: Andrea  Past Medical History Relevant to Rehab: status post right shoulder arthroscopy extensive debridement subacromial decompression rotator cuff repair.-  Visit #2    Current Problem  Problem List Items Addressed This Visit           ICD-10-CM    Rotator cuff arthropathy of right shoulder - Primary M12.811         Assessment:Patient identity confirmed today with name/. ;  ( 0 ) falls since last clinic visit;  shown  PROM today      Plan:  Treatment/Interventions: Aquatic therapy, Cryotherapy, Dry needling, Education/ Instruction, Hot pack, Manual therapy, Self care/ home management, Therapeutic exercises, Other (comment) (IASTM/cupping)  PT Plan: Skilled PT  Duration: 6wks  Onset Date: 25  Rehab Potential: Good  Plan of Care Agreement: Patient  Continue with clinic rehab treatments toward functional goals ( reaching); continue per protocol in box    Subjective: I have  0 /10 pain right now.         Precautions  Precautions  Precautions Comment: no resistance until ; A-fib (controlled); BPPV; COPD; L lateral epic; R neck pain; RUE N+T      Pain  Pain Assessment: 0-10  0-10 (Numeric) Pain Score: 0 - No pain  Pain Location: Shoulder  Pain Orientation: Right    Objective    Manual:___23__min  PROM per protocol (40-ER; up to 140 flex)      There ex:__0___min      POC:  Ongoing clinic PT to include:continue with ROM/PRE per DR protocol (in box);  STW PRN;  no resistance until            OP EDUCATION:      Goals:  Active       PT Problem       PT Goal 1       Start:  25    Expected End:  10/22/25        1. Independent HEP to allow for 50% reduction in max ADL C/C sx ( 5/10) 3-4wks         PT Goal 2       Start:  07/24/25    Expected End:  10/22/25       2. 0/10 night time sx to allow for uninterrupted sleep x 1wk ( 1/7) 3-4wks         PT Goal 3       Start:  07/24/25    Expected End:  10/22/25       3. Survey score improvement from 52% to 40% (QDI-per DR protocol)         PT Goal 4       Start:  07/24/25    Expected End:  10/22/25       4. ROM increase to allow for improved ADL reaching (per protocol)         PT Goal 5       Start:  07/24/25    Expected End:  10/22/25       5. Strength increase to allow for improved ADL object handling (per Dr protocol)

## 2025-07-29 DIAGNOSIS — E11.9 TYPE 2 DIABETES MELLITUS WITHOUT COMPLICATION, WITHOUT LONG-TERM CURRENT USE OF INSULIN: ICD-10-CM

## 2025-07-29 DIAGNOSIS — I10 ESSENTIAL HYPERTENSION: ICD-10-CM

## 2025-07-29 DIAGNOSIS — E78.2 MIXED HYPERLIPIDEMIA: ICD-10-CM

## 2025-07-31 ENCOUNTER — TREATMENT (OUTPATIENT)
Dept: PHYSICAL THERAPY | Facility: CLINIC | Age: 70
End: 2025-07-31
Payer: COMMERCIAL

## 2025-07-31 DIAGNOSIS — M12.811 ROTATOR CUFF ARTHROPATHY OF RIGHT SHOULDER: Primary | ICD-10-CM

## 2025-07-31 PROCEDURE — 97140 MANUAL THERAPY 1/> REGIONS: CPT | Mod: GP,CQ

## 2025-07-31 PROCEDURE — 97110 THERAPEUTIC EXERCISES: CPT | Mod: GP,CQ

## 2025-07-31 ASSESSMENT — PAIN - FUNCTIONAL ASSESSMENT: PAIN_FUNCTIONAL_ASSESSMENT: 0-10

## 2025-07-31 ASSESSMENT — PAIN SCALES - GENERAL: PAINLEVEL_OUTOF10: 3

## 2025-07-31 ASSESSMENT — PAIN DESCRIPTION - DESCRIPTORS: DESCRIPTORS: ACHING;SORE

## 2025-07-31 NOTE — PROGRESS NOTES
Physical Therapy Treatment    Patient Name: Bella Leone  MRN: [id number]  Today's Date: 7/31/2025  Time Calculation  Start Time: 0915  Stop Time: 0951  Time Calculation (min): 36 min  PT Therapeutic Procedures Time Entry  Therapeutic Exercise Time Entry: 10  Manual Therapy Time Entry: 23         Current Problem  Problem List Items Addressed This Visit           ICD-10-CM    Rotator cuff arthropathy of right shoulder - Primary M12.811       Subjective   Pt.'s name and birthday confirmed.  Pt. Has 3/10 pain reported with right shoulder.   Pt. States she feels sore all over d/t the weather (possibly).  No c/o recent falls.    Reason for Referral: RTC arthropathy R  Referred By: Andrea  Past Medical History Relevant to Rehab: status post right shoulder arthroscopy extensive debridement subacromial decompression rotator cuff repair.-6/27  General Comment: Visit #3      Precautions  Precautions  Precautions Comment: no resistance until 8/29; A-fib (controlled); BPPV; COPD; L lateral epic; R neck pain; RUE N+T      Pain  Pain Assessment: 0-10  0-10 (Numeric) Pain Score: 3  Pain Location: Shoulder  Pain Orientation: Right  Pain Descriptors: Aching, Sore    Objective:    Treatments: x10 mins  Seated scapular retractions x10 (N)  Pendulums F-B/s/s x10 ea (N)    Manual:  x23 mins  PROM per protocol (40-ER; up to 140 flex)    POC:  Ongoing clinic PT to include:continue with ROM/PRE per DR protocol (in box);  STW PRN;  no resistance until 8/29     OP EDUCATION:  Access Code: [email or web address]  URL: [email or web address]  Date: 07/31/2025  Prepared by: Nataly Tenorio    Exercises  - Seated Scapular Retraction  - 1 x daily - 7 x weekly - 2 sets - 10 reps  - Flexion-Extension Shoulder Pendulum with Table Support  - 1 x daily - 7 x weekly - 1 sets - 10 reps  - Horizontal Shoulder Pendulum with Table Support  - 1 x daily - 7 x weekly - 1 sets - 10 reps           Assessment:  Trial scapular retractions and pendulums  this session, has no c/o sx.'s noted.  PROM in ER and flexion with end range tension noted.  Handout provided and reviewed with patient.           Plan:  Continue with POC per protocol and progress to improve ADL's with greater ease.  MB         OP PT Plan  Treatment/Interventions: Aquatic therapy, Cryotherapy, Dry needling, Education/ Instruction, Hot pack, Manual therapy, Self care/ home management, Therapeutic exercises, Other (comment) (IASTM/cupping)  PT Plan: Skilled PT  Duration: 6wks  Onset Date: 06/27/25  Rehab Potential: Good  Plan of Care Agreement: Patient              Goals:  Active       PT Problem       PT Goal 1       Start:  07/24/25    Expected End:  10/22/25       1. Independent HEP to allow for 50% reduction in max ADL C/C sx ( 5/10) 3-4wks         PT Goal 2       Start:  07/24/25    Expected End:  10/22/25       2. 0/10 night time sx to allow for uninterrupted sleep x 1wk ( 1/7) 3-4wks         PT Goal 3       Start:  07/24/25    Expected End:  10/22/25       3. Survey score improvement from 52% to 40% (QDI-per DR protocol)         PT Goal 4       Start:  07/24/25    Expected End:  10/22/25       4. ROM increase to allow for improved ADL reaching (per protocol)         PT Goal 5       Start:  07/24/25    Expected End:  10/22/25       5. Strength increase to allow for improved ADL object handling (per Dr protocol)

## 2025-08-01 DIAGNOSIS — E78.2 MIXED HYPERLIPIDEMIA: ICD-10-CM

## 2025-08-01 RX ORDER — PRAVASTATIN SODIUM 20 MG/1
20 TABLET ORAL NIGHTLY
Qty: 30 TABLET | Refills: 0 | Status: SHIPPED | OUTPATIENT
Start: 2025-08-01

## 2025-08-01 NOTE — TELEPHONE ENCOUNTER
Rx Refill Request Telephone Encounter    Name:  Bella Mezay  :  [phone]    Specific Pharmacy location: [facility] pharmacy in   [place]   Date of last appointment:  25  Date of next appointment:  25

## 2025-08-05 ENCOUNTER — TREATMENT (OUTPATIENT)
Dept: PHYSICAL THERAPY | Facility: CLINIC | Age: 70
End: 2025-08-05
Payer: COMMERCIAL

## 2025-08-05 DIAGNOSIS — M12.811 ROTATOR CUFF ARTHROPATHY OF RIGHT SHOULDER: Primary | ICD-10-CM

## 2025-08-05 PROCEDURE — 97110 THERAPEUTIC EXERCISES: CPT | Mod: GP,CQ

## 2025-08-05 PROCEDURE — 97140 MANUAL THERAPY 1/> REGIONS: CPT | Mod: GP,CQ

## 2025-08-05 ASSESSMENT — PAIN - FUNCTIONAL ASSESSMENT: PAIN_FUNCTIONAL_ASSESSMENT: 0-10

## 2025-08-05 ASSESSMENT — PAIN SCALES - GENERAL: PAINLEVEL_OUTOF10: 1

## 2025-08-05 ASSESSMENT — PAIN DESCRIPTION - DESCRIPTORS: DESCRIPTORS: ACHING;SORE

## 2025-08-05 NOTE — PROGRESS NOTES
Physical Therapy Treatment    Patient Name: Bella Leone  MRN: [id number]  Today's Date: 8/5/2025  Time Calculation  Start Time: 0915  Stop Time: 0956  Time Calculation (min): 41 min  PT Therapeutic Procedures Time Entry  Therapeutic Exercise Time Entry: 15  Manual Therapy Time Entry: 23         Current Problem  Problem List Items Addressed This Visit           ICD-10-CM    Rotator cuff arthropathy of right shoulder - Primary M12.811       Subjective   Pt.'s name and birthday confirmed.  Pt. Reports compliance with HEP.  Pt. Has 1/10 soreness with right shoulder.  No c/o recent falls.    Reason for Referral: RTC arthropathy R  Referred By: Andrea  Past Medical History Relevant to Rehab: status post right shoulder arthroscopy extensive debridement subacromial decompression rotator cuff repair.-6/27  General Comment: Visit #4      Precautions  Precautions  Precautions Comment: no resistance until 8/29; A-fib (controlled); BPPV; COPD; L lateral epic; R neck pain; RUE N+T      Pain  Pain Assessment: 0-10  0-10 (Numeric) Pain Score: 1  Pain Location: Shoulder  Pain Orientation: Right  Pain Descriptors: Aching, Sore    Objective:    Treatments:  Seated scapular retractions x20  Pendulums F-B/s/s x10 ea   Table slides with ball flex/scaption x20 ea (N)  Seated shoulder shrugs x10 (N)  Seated shoulder rolls x10 (N)     Manual:  x23 mins  PROM per protocol (40-ER; up to 140 flex)  Elbow flexion/extension stretches (N)     POC:  Ongoing clinic PT to include:continue with ROM/PRE per DR protocol (in box);  STW PRN;  no resistance until 8/29           OP EDUCATION:  Access Code: [email or web address]  URL: [email or web address]  Date: 07/31/2025  Prepared by: Nataly Tenorio     Exercises  - Seated Scapular Retraction  - 1 x daily - 7 x weekly - 2 sets - 10 reps  - Flexion-Extension Shoulder Pendulum with Table Support  - 1 x daily - 7 x weekly - 1 sets - 10 reps  - Horizontal Shoulder Pendulum with Table Support  -  1 x daily - 7 x weekly - 1 sets - 10 reps               Assessment:  Trial table slides with stability ball, mild stretch noted with shoulder.  No c/o increased pain.  PROM applied for right shoulder for flexion and ER.  Added elbow flexion and extensions as well.             Plan:  Continue with POC per protocol and progress as able to improve ADL's with greater ease.  MB          Goals:  Active       PT Problem       PT Goal 1       Start:  07/24/25    Expected End:  10/22/25       1. Independent HEP to allow for 50% reduction in max ADL C/C sx ( 5/10) 3-4wks         PT Goal 2       Start:  07/24/25    Expected End:  10/22/25       2. 0/10 night time sx to allow for uninterrupted sleep x 1wk ( 1/7) 3-4wks         PT Goal 3       Start:  07/24/25    Expected End:  10/22/25       3. Survey score improvement from 52% to 40% (QDI-per DR protocol)         PT Goal 4       Start:  07/24/25    Expected End:  10/22/25       4. ROM increase to allow for improved ADL reaching (per protocol)         PT Goal 5       Start:  07/24/25    Expected End:  10/22/25       5. Strength increase to allow for improved ADL object handling (per Dr protocol)

## 2025-08-07 ENCOUNTER — APPOINTMENT (OUTPATIENT)
Dept: PHYSICAL THERAPY | Facility: CLINIC | Age: 70
End: 2025-08-07
Payer: COMMERCIAL

## 2025-08-07 DIAGNOSIS — M12.811 ROTATOR CUFF ARTHROPATHY OF RIGHT SHOULDER: Primary | ICD-10-CM

## 2025-08-08 DIAGNOSIS — E11.9 TYPE 2 DIABETES MELLITUS WITHOUT COMPLICATION, WITHOUT LONG-TERM CURRENT USE OF INSULIN: ICD-10-CM

## 2025-08-08 RX ORDER — BLOOD SUGAR DIAGNOSTIC
STRIP MISCELLANEOUS
Qty: 100 EACH | Refills: 8 | Status: SHIPPED | OUTPATIENT
Start: 2025-08-08

## 2025-08-11 ENCOUNTER — TREATMENT (OUTPATIENT)
Dept: PHYSICAL THERAPY | Facility: CLINIC | Age: 70
End: 2025-08-11
Payer: COMMERCIAL

## 2025-08-11 DIAGNOSIS — Z79.4 TYPE 2 DIABETES MELLITUS WITHOUT COMPLICATION, WITH LONG-TERM CURRENT USE OF INSULIN: ICD-10-CM

## 2025-08-11 DIAGNOSIS — E11.9 TYPE 2 DIABETES MELLITUS WITHOUT COMPLICATION, WITH LONG-TERM CURRENT USE OF INSULIN: ICD-10-CM

## 2025-08-11 DIAGNOSIS — M12.811 ROTATOR CUFF ARTHROPATHY OF RIGHT SHOULDER: Primary | ICD-10-CM

## 2025-08-11 PROCEDURE — 97110 THERAPEUTIC EXERCISES: CPT | Mod: GP,CQ

## 2025-08-11 PROCEDURE — 97140 MANUAL THERAPY 1/> REGIONS: CPT | Mod: GP,CQ

## 2025-08-11 RX ORDER — BLOOD SUGAR DIAGNOSTIC
STRIP MISCELLANEOUS
Qty: 100 STRIP | Refills: 0 | Status: SHIPPED | OUTPATIENT
Start: 2025-08-11

## 2025-08-11 RX ORDER — DEXTROSE 4 G
TABLET,CHEWABLE ORAL
Qty: 1 EACH | Refills: 0 | Status: SHIPPED | OUTPATIENT
Start: 2025-08-11

## 2025-08-11 RX ORDER — LANCETS
EACH MISCELLANEOUS
Qty: 100 EACH | Refills: 0 | Status: SHIPPED | OUTPATIENT
Start: 2025-08-11

## 2025-08-11 ASSESSMENT — PAIN DESCRIPTION - DESCRIPTORS: DESCRIPTORS: ACHING;SORE

## 2025-08-11 ASSESSMENT — PAIN SCALES - GENERAL: PAINLEVEL_OUTOF10: 2

## 2025-08-11 ASSESSMENT — PAIN - FUNCTIONAL ASSESSMENT: PAIN_FUNCTIONAL_ASSESSMENT: 0-10

## 2025-08-14 ENCOUNTER — APPOINTMENT (OUTPATIENT)
Dept: PHYSICAL THERAPY | Facility: CLINIC | Age: 70
End: 2025-08-14
Payer: COMMERCIAL

## 2025-08-14 DIAGNOSIS — M12.811 ROTATOR CUFF ARTHROPATHY OF RIGHT SHOULDER: Primary | ICD-10-CM

## 2025-08-18 ENCOUNTER — APPOINTMENT (OUTPATIENT)
Dept: ORTHOPEDIC SURGERY | Facility: CLINIC | Age: 70
End: 2025-08-18
Payer: COMMERCIAL

## 2025-08-18 DIAGNOSIS — M12.811 ROTATOR CUFF ARTHROPATHY OF RIGHT SHOULDER: Primary | ICD-10-CM

## 2025-08-18 DIAGNOSIS — I10 ESSENTIAL HYPERTENSION: ICD-10-CM

## 2025-08-18 PROCEDURE — 4010F ACE/ARB THERAPY RXD/TAKEN: CPT | Performed by: PHYSICIAN ASSISTANT

## 2025-08-18 PROCEDURE — 99024 POSTOP FOLLOW-UP VISIT: CPT | Performed by: PHYSICIAN ASSISTANT

## 2025-08-18 RX ORDER — LOSARTAN POTASSIUM 25 MG/1
25 TABLET ORAL DAILY
Qty: 90 TABLET | Refills: 3 | Status: SHIPPED | OUTPATIENT
Start: 2025-08-18 | End: 2026-08-18

## 2025-08-19 ENCOUNTER — TREATMENT (OUTPATIENT)
Dept: PHYSICAL THERAPY | Facility: CLINIC | Age: 70
End: 2025-08-19
Payer: COMMERCIAL

## 2025-08-19 DIAGNOSIS — M12.811 ROTATOR CUFF ARTHROPATHY OF RIGHT SHOULDER: Primary | ICD-10-CM

## 2025-08-19 PROCEDURE — 97140 MANUAL THERAPY 1/> REGIONS: CPT | Mod: GP,CQ

## 2025-08-19 PROCEDURE — 97110 THERAPEUTIC EXERCISES: CPT | Mod: GP,CQ

## 2025-08-19 ASSESSMENT — PAIN - FUNCTIONAL ASSESSMENT: PAIN_FUNCTIONAL_ASSESSMENT: 0-10

## 2025-08-19 ASSESSMENT — PAIN SCALES - GENERAL: PAINLEVEL_OUTOF10: 0 - NO PAIN

## 2025-08-20 LAB
ALBUMIN SERPL-MCNC: 4.4 G/DL (ref 3.6–5.1)
ALP SERPL-CCNC: 71 U/L (ref 37–153)
ALT SERPL-CCNC: 17 U/L (ref 6–29)
ANION GAP SERPL CALCULATED.4IONS-SCNC: 13 MMOL/L (CALC) (ref 7–17)
AST SERPL-CCNC: 17 U/L (ref 10–35)
BASOPHILS # BLD AUTO: 28 CELLS/UL (ref 0–200)
BASOPHILS NFR BLD AUTO: 0.4 %
BILIRUB SERPL-MCNC: 0.5 MG/DL (ref 0.2–1.2)
BUN SERPL-MCNC: 16 MG/DL (ref 7–25)
CALCIUM SERPL-MCNC: 9.3 MG/DL (ref 8.6–10.4)
CHLORIDE SERPL-SCNC: 106 MMOL/L (ref 98–110)
CHOLEST SERPL-MCNC: 193 MG/DL
CHOLEST/HDLC SERPL: 4.3 (CALC)
CO2 SERPL-SCNC: 23 MMOL/L (ref 20–32)
CREAT SERPL-MCNC: 0.78 MG/DL (ref 0.5–1.05)
EGFRCR SERPLBLD CKD-EPI 2021: 83 ML/MIN/1.73M2
EOSINOPHIL # BLD AUTO: 140 CELLS/UL (ref 15–500)
EOSINOPHIL NFR BLD AUTO: 2 %
ERYTHROCYTE [DISTWIDTH] IN BLOOD BY AUTOMATED COUNT: 14.7 % (ref 11–15)
EST. AVERAGE GLUCOSE BLD GHB EST-MCNC: 143 MG/DL
EST. AVERAGE GLUCOSE BLD GHB EST-SCNC: 7.9 MMOL/L
GLUCOSE SERPL-MCNC: 130 MG/DL (ref 65–99)
HBA1C MFR BLD: 6.6 %
HCT VFR BLD AUTO: 44.8 % (ref 35–45)
HDLC SERPL-MCNC: 45 MG/DL
HGB BLD-MCNC: 14.1 G/DL (ref 11.7–15.5)
LDLC SERPL CALC-MCNC: 113 MG/DL (CALC)
LYMPHOCYTES # BLD AUTO: 3150 CELLS/UL (ref 850–3900)
LYMPHOCYTES NFR BLD AUTO: 45 %
MCH RBC QN AUTO: 30.1 PG (ref 27–33)
MCHC RBC AUTO-ENTMCNC: 31.5 G/DL (ref 32–36)
MCV RBC AUTO: 95.5 FL (ref 80–100)
MONOCYTES # BLD AUTO: 497 CELLS/UL (ref 200–950)
MONOCYTES NFR BLD AUTO: 7.1 %
NEUTROPHILS # BLD AUTO: 3185 CELLS/UL (ref 1500–7800)
NEUTROPHILS NFR BLD AUTO: 45.5 %
NONHDLC SERPL-MCNC: 148 MG/DL (CALC)
PLATELET # BLD AUTO: 266 THOUSAND/UL (ref 140–400)
PMV BLD REES-ECKER: 10.4 FL (ref 7.5–12.5)
POTASSIUM SERPL-SCNC: 4.2 MMOL/L (ref 3.5–5.3)
PROT SERPL-MCNC: 6.8 G/DL (ref 6.1–8.1)
RBC # BLD AUTO: 4.69 MILLION/UL (ref 3.8–5.1)
SODIUM SERPL-SCNC: 142 MMOL/L (ref 135–146)
TRIGL SERPL-MCNC: 229 MG/DL
WBC # BLD AUTO: 7 THOUSAND/UL (ref 3.8–10.8)

## 2025-08-21 ENCOUNTER — TREATMENT (OUTPATIENT)
Dept: PHYSICAL THERAPY | Facility: CLINIC | Age: 70
End: 2025-08-21
Payer: COMMERCIAL

## 2025-08-21 DIAGNOSIS — M12.811 ROTATOR CUFF ARTHROPATHY OF RIGHT SHOULDER: Primary | ICD-10-CM

## 2025-08-21 PROCEDURE — 97110 THERAPEUTIC EXERCISES: CPT | Mod: GP

## 2025-08-21 PROCEDURE — 97140 MANUAL THERAPY 1/> REGIONS: CPT | Mod: GP

## 2025-08-21 ASSESSMENT — PAIN - FUNCTIONAL ASSESSMENT: PAIN_FUNCTIONAL_ASSESSMENT: 0-10

## 2025-08-21 ASSESSMENT — PAIN SCALES - GENERAL: PAINLEVEL_OUTOF10: 0 - NO PAIN

## 2025-08-25 ENCOUNTER — TREATMENT (OUTPATIENT)
Dept: PHYSICAL THERAPY | Facility: CLINIC | Age: 70
End: 2025-08-25
Payer: COMMERCIAL

## 2025-08-25 DIAGNOSIS — M12.811 ROTATOR CUFF ARTHROPATHY OF RIGHT SHOULDER: Primary | ICD-10-CM

## 2025-08-25 PROCEDURE — 97140 MANUAL THERAPY 1/> REGIONS: CPT | Mod: GP,CQ

## 2025-08-25 PROCEDURE — 97110 THERAPEUTIC EXERCISES: CPT | Mod: GP,CQ

## 2025-08-25 ASSESSMENT — PAIN - FUNCTIONAL ASSESSMENT: PAIN_FUNCTIONAL_ASSESSMENT: 0-10

## 2025-08-25 ASSESSMENT — PAIN SCALES - GENERAL: PAINLEVEL_OUTOF10: 1

## 2025-08-26 ENCOUNTER — APPOINTMENT (OUTPATIENT)
Dept: PRIMARY CARE | Facility: CLINIC | Age: 70
End: 2025-08-26
Payer: COMMERCIAL

## 2025-08-26 VITALS
WEIGHT: 214 LBS | SYSTOLIC BLOOD PRESSURE: 128 MMHG | OXYGEN SATURATION: 96 % | BODY MASS INDEX: 32.43 KG/M2 | HEIGHT: 68 IN | HEART RATE: 74 BPM | TEMPERATURE: 97.6 F | RESPIRATION RATE: 13 BRPM | DIASTOLIC BLOOD PRESSURE: 82 MMHG

## 2025-08-26 DIAGNOSIS — E78.2 MIXED HYPERLIPIDEMIA: ICD-10-CM

## 2025-08-26 DIAGNOSIS — E11.9 TYPE 2 DIABETES MELLITUS WITHOUT COMPLICATION, WITHOUT LONG-TERM CURRENT USE OF INSULIN: ICD-10-CM

## 2025-08-26 DIAGNOSIS — Z00.00 ANNUAL PHYSICAL EXAM: Primary | ICD-10-CM

## 2025-08-26 DIAGNOSIS — K21.9 GASTRO-ESOPHAGEAL REFLUX DISEASE WITHOUT ESOPHAGITIS: ICD-10-CM

## 2025-08-26 DIAGNOSIS — I10 ESSENTIAL HYPERTENSION: ICD-10-CM

## 2025-08-26 DIAGNOSIS — K59.00 CONSTIPATION, UNSPECIFIED CONSTIPATION TYPE: ICD-10-CM

## 2025-08-26 PROCEDURE — 3074F SYST BP LT 130 MM HG: CPT | Performed by: FAMILY MEDICINE

## 2025-08-26 PROCEDURE — 3008F BODY MASS INDEX DOCD: CPT | Performed by: FAMILY MEDICINE

## 2025-08-26 PROCEDURE — 4010F ACE/ARB THERAPY RXD/TAKEN: CPT | Performed by: FAMILY MEDICINE

## 2025-08-26 PROCEDURE — 3079F DIAST BP 80-89 MM HG: CPT | Performed by: FAMILY MEDICINE

## 2025-08-26 PROCEDURE — 1160F RVW MEDS BY RX/DR IN RCRD: CPT | Performed by: FAMILY MEDICINE

## 2025-08-26 PROCEDURE — 1036F TOBACCO NON-USER: CPT | Performed by: FAMILY MEDICINE

## 2025-08-26 PROCEDURE — 1159F MED LIST DOCD IN RCRD: CPT | Performed by: FAMILY MEDICINE

## 2025-08-26 PROCEDURE — 99214 OFFICE O/P EST MOD 30 MIN: CPT | Performed by: FAMILY MEDICINE

## 2025-08-26 PROCEDURE — 99397 PER PM REEVAL EST PAT 65+ YR: CPT | Performed by: FAMILY MEDICINE

## 2025-08-26 RX ORDER — OMEPRAZOLE 40 MG/1
40 CAPSULE, DELAYED RELEASE ORAL DAILY
Qty: 30 CAPSULE | Refills: 5 | Status: SHIPPED | OUTPATIENT
Start: 2025-08-26

## 2025-08-26 RX ORDER — LINAGLIPTIN 5 MG/1
5 TABLET, FILM COATED ORAL DAILY
Qty: 30 TABLET | Refills: 3 | Status: SHIPPED | OUTPATIENT
Start: 2025-08-26

## 2025-08-26 RX ORDER — PRAVASTATIN SODIUM 20 MG/1
20 TABLET ORAL NIGHTLY
Qty: 30 TABLET | Refills: 3 | Status: SHIPPED | OUTPATIENT
Start: 2025-08-26

## 2025-08-26 RX ORDER — EZETIMIBE 10 MG/1
10 TABLET ORAL DAILY
Qty: 30 TABLET | Refills: 3 | Status: SHIPPED | OUTPATIENT
Start: 2025-08-26

## 2025-08-26 ASSESSMENT — ENCOUNTER SYMPTOMS
CONSTIPATION: 1
RHINORRHEA: 0
FREQUENCY: 0
PALPITATIONS: 0
RECTAL PAIN: 0
WEAKNESS: 0
VISUAL CHANGE: 0
TREMORS: 0
COUGH: 0
SHORTNESS OF BREATH: 0
ABDOMINAL PAIN: 0
CHILLS: 0
DYSURIA: 0
BRUISES/BLEEDS EASILY: 0
HEMATURIA: 0
HEADACHES: 0
POLYPHAGIA: 0
POLYDIPSIA: 0
ADENOPATHY: 0
BLURRED VISION: 0
DIARRHEA: 0
FATIGUE: 0
NUMBNESS: 0
VOMITING: 0
SORE THROAT: 0
WHEEZING: 0
DIZZINESS: 0
FEVER: 0
BLOOD IN STOOL: 0

## 2025-08-28 ENCOUNTER — APPOINTMENT (OUTPATIENT)
Dept: PHYSICAL THERAPY | Facility: CLINIC | Age: 70
End: 2025-08-28
Payer: COMMERCIAL

## 2025-08-28 ENCOUNTER — DOCUMENTATION (OUTPATIENT)
Dept: PHYSICAL THERAPY | Facility: CLINIC | Age: 70
End: 2025-08-28
Payer: COMMERCIAL

## 2025-08-28 DIAGNOSIS — M12.811 ROTATOR CUFF ARTHROPATHY OF RIGHT SHOULDER: Primary | ICD-10-CM

## 2025-09-02 ENCOUNTER — TREATMENT (OUTPATIENT)
Dept: PHYSICAL THERAPY | Facility: CLINIC | Age: 70
End: 2025-09-02
Payer: COMMERCIAL

## 2025-09-02 DIAGNOSIS — M12.811 ROTATOR CUFF ARTHROPATHY OF RIGHT SHOULDER: Primary | ICD-10-CM

## 2025-09-02 PROCEDURE — 97110 THERAPEUTIC EXERCISES: CPT | Mod: GP,CQ

## 2025-09-02 ASSESSMENT — PAIN - FUNCTIONAL ASSESSMENT: PAIN_FUNCTIONAL_ASSESSMENT: 0-10

## 2025-09-02 ASSESSMENT — PAIN SCALES - GENERAL: PAINLEVEL_OUTOF10: 0 - NO PAIN

## 2025-09-04 ENCOUNTER — DOCUMENTATION (OUTPATIENT)
Dept: PHYSICAL THERAPY | Facility: CLINIC | Age: 70
End: 2025-09-04
Payer: COMMERCIAL

## 2025-09-04 DIAGNOSIS — M12.811 ROTATOR CUFF ARTHROPATHY OF RIGHT SHOULDER: Primary | ICD-10-CM

## 2025-09-29 ENCOUNTER — APPOINTMENT (OUTPATIENT)
Dept: ORTHOPEDIC SURGERY | Facility: CLINIC | Age: 70
End: 2025-09-29
Payer: COMMERCIAL

## 2025-12-10 ENCOUNTER — APPOINTMENT (OUTPATIENT)
Dept: CARDIOLOGY | Facility: CLINIC | Age: 70
End: 2025-12-10
Payer: COMMERCIAL

## 2025-12-22 ENCOUNTER — APPOINTMENT (OUTPATIENT)
Dept: PRIMARY CARE | Facility: CLINIC | Age: 70
End: 2025-12-22
Payer: COMMERCIAL

## (undated) DEVICE — TUBING, PUMP REDEUCE 8FT STERILE

## (undated) DEVICE — SUTURE, ETHILON, 3-0, 18 IN, PS1, BLACK

## (undated) DEVICE — STRAP, VELCRO, BODY, 4 X 60IN, NS

## (undated) DEVICE — BAND, VASCULAR, RADIAL HEMOSTAT, REGULAR 24CM

## (undated) DEVICE — GLOVE, SURGICAL, PROTEXIS PI , 8.0, PF, LF

## (undated) DEVICE — MAT, FLOOR, STANDARD FLUID BARRIER, 32X44, GREEN

## (undated) DEVICE — Device

## (undated) DEVICE — TUBING, PATIENT 8FT STERILE

## (undated) DEVICE — STAR SLEEVE, COBAN, STRL

## (undated) DEVICE — PREP, IODOPHOR, W/ALCOHOL, DURAPREP, W/APPLICATOR, 26 CC

## (undated) DEVICE — CATHETER, OPTITORQUE, 5FR, JACKY, 3.5/ 2H/110CM, CURVED

## (undated) DEVICE — DRAPE, SHEET, 17 X 23 IN

## (undated) DEVICE — GOWN, SURGICAL, ROYAL SILK, LG, STERILE

## (undated) DEVICE — PROTECTOR, NERVE, ULNAR, PINK

## (undated) DEVICE — GLOVE, SURGICAL, PROTEXIS PI , 7.5, PF, LF

## (undated) DEVICE — TUBING, SUCTION, 6MM X 10, CLEAN N-COND

## (undated) DEVICE — BLADE, STRYKER, 4.0MM, RESECTOR, F-SERIES

## (undated) DEVICE — SUTURE, PROLENE, 0, 30 IN, FSLX, BLUE

## (undated) DEVICE — TUBING, MANIFOLD, LOW PRESSURE

## (undated) DEVICE — POSITIONER, CHEST ROLL, FOAM

## (undated) DEVICE — GLOVE, SURGICAL, PROTEXIS PI BLUE W/NEUTHERA, 8.0, PF, LF

## (undated) DEVICE — MANIFOLD, 4 PORT NEPTUNE STANDARD

## (undated) DEVICE — CANNULA, ARTHROSCOPIC TWIST-IN 7MM

## (undated) DEVICE — SHEATH, GLIDESHEATH, SLENDER, 6FR 10CM

## (undated) DEVICE — POSITIONER, CRADLE, HEAD, MEDC, FOAM SLOTTED

## (undated) DEVICE — GOWN, SURGICAL, ROYAL SILK, XL, STERILE

## (undated) DEVICE — PROBE, CRUISE ENERGY, ARTHOSCOPIC SERFAS 90-S 4.0MM

## (undated) DEVICE — STRAP, ARM BOARD, 32 X 1.5